# Patient Record
Sex: MALE | Race: WHITE | NOT HISPANIC OR LATINO | Employment: OTHER | URBAN - METROPOLITAN AREA
[De-identification: names, ages, dates, MRNs, and addresses within clinical notes are randomized per-mention and may not be internally consistent; named-entity substitution may affect disease eponyms.]

---

## 2019-01-01 ENCOUNTER — PATIENT OUTREACH (OUTPATIENT)
Dept: HEALTH INFORMATION MANAGEMENT | Facility: OTHER | Age: 69
End: 2019-01-01

## 2019-01-01 ENCOUNTER — HOSPITAL ENCOUNTER (EMERGENCY)
Facility: MEDICAL CENTER | Age: 69
End: 2019-06-11
Attending: EMERGENCY MEDICINE
Payer: MEDICARE

## 2019-01-01 ENCOUNTER — APPOINTMENT (OUTPATIENT)
Dept: MEDICAL GROUP | Facility: PHYSICIAN GROUP | Age: 69
End: 2019-01-01
Payer: MEDICARE

## 2019-01-01 ENCOUNTER — TELEPHONE (OUTPATIENT)
Dept: SCHEDULING | Facility: IMAGING CENTER | Age: 69
End: 2019-01-01

## 2019-01-01 ENCOUNTER — APPOINTMENT (OUTPATIENT)
Dept: RADIOLOGY | Facility: MEDICAL CENTER | Age: 69
End: 2019-01-01
Attending: RADIOLOGY
Payer: MEDICARE

## 2019-01-01 ENCOUNTER — HOSPITAL ENCOUNTER (OUTPATIENT)
Dept: LAB | Facility: MEDICAL CENTER | Age: 69
End: 2019-05-24
Attending: INTERNAL MEDICINE
Payer: MEDICARE

## 2019-01-01 ENCOUNTER — HOSPITAL ENCOUNTER (OUTPATIENT)
Dept: LAB | Facility: MEDICAL CENTER | Age: 69
End: 2019-05-29
Attending: NURSE PRACTITIONER
Payer: MEDICARE

## 2019-01-01 ENCOUNTER — HOSPITAL ENCOUNTER (OUTPATIENT)
Dept: RADIOLOGY | Facility: MEDICAL CENTER | Age: 69
End: 2019-07-03
Attending: INTERNAL MEDICINE
Payer: MEDICARE

## 2019-01-01 ENCOUNTER — HOSPITAL ENCOUNTER (INPATIENT)
Facility: MEDICAL CENTER | Age: 69
LOS: 8 days | DRG: 871 | End: 2019-09-30
Attending: EMERGENCY MEDICINE | Admitting: HOSPITALIST
Payer: MEDICARE

## 2019-01-01 ENCOUNTER — OFFICE VISIT (OUTPATIENT)
Dept: PULMONOLOGY | Facility: HOSPICE | Age: 69
End: 2019-01-01
Payer: MEDICARE

## 2019-01-01 ENCOUNTER — OFFICE VISIT (OUTPATIENT)
Dept: MEDICAL GROUP | Facility: PHYSICIAN GROUP | Age: 69
End: 2019-01-01

## 2019-01-01 ENCOUNTER — HOSPITAL ENCOUNTER (INPATIENT)
Facility: MEDICAL CENTER | Age: 69
LOS: 6 days | DRG: 177 | End: 2019-09-19
Attending: EMERGENCY MEDICINE | Admitting: INTERNAL MEDICINE
Payer: MEDICARE

## 2019-01-01 ENCOUNTER — HOSPITAL ENCOUNTER (OUTPATIENT)
Dept: LAB | Facility: MEDICAL CENTER | Age: 69
End: 2019-06-03
Attending: INTERNAL MEDICINE
Payer: MEDICARE

## 2019-01-01 ENCOUNTER — HOSPITAL ENCOUNTER (OUTPATIENT)
Facility: MEDICAL CENTER | Age: 69
End: 2019-05-08
Attending: FAMILY MEDICINE | Admitting: FAMILY MEDICINE
Payer: MEDICARE

## 2019-01-01 ENCOUNTER — APPOINTMENT (OUTPATIENT)
Dept: RADIOLOGY | Facility: MEDICAL CENTER | Age: 69
DRG: 177 | End: 2019-01-01
Attending: EMERGENCY MEDICINE
Payer: MEDICARE

## 2019-01-01 ENCOUNTER — HOSPITAL ENCOUNTER (OUTPATIENT)
Dept: LAB | Facility: MEDICAL CENTER | Age: 69
End: 2019-05-15
Attending: INTERNAL MEDICINE
Payer: MEDICARE

## 2019-01-01 ENCOUNTER — APPOINTMENT (OUTPATIENT)
Dept: RADIOLOGY | Facility: MEDICAL CENTER | Age: 69
DRG: 871 | End: 2019-01-01
Attending: EMERGENCY MEDICINE
Payer: MEDICARE

## 2019-01-01 ENCOUNTER — HOSPITAL ENCOUNTER (OUTPATIENT)
Dept: RADIOLOGY | Facility: MEDICAL CENTER | Age: 69
End: 2019-05-23
Attending: INTERNAL MEDICINE
Payer: MEDICARE

## 2019-01-01 ENCOUNTER — HOSPITAL ENCOUNTER (OUTPATIENT)
Dept: RADIOLOGY | Facility: MEDICAL CENTER | Age: 69
End: 2019-07-16
Attending: INTERNAL MEDICINE
Payer: MEDICARE

## 2019-01-01 ENCOUNTER — APPOINTMENT (OUTPATIENT)
Dept: RADIOLOGY | Facility: MEDICAL CENTER | Age: 69
DRG: 871 | End: 2019-01-01
Attending: HOSPITALIST
Payer: MEDICARE

## 2019-01-01 ENCOUNTER — APPOINTMENT (OUTPATIENT)
Dept: ADMISSIONS | Facility: MEDICAL CENTER | Age: 69
End: 2019-01-01
Attending: FAMILY MEDICINE
Payer: MEDICARE

## 2019-01-01 ENCOUNTER — HOME CARE VISIT (OUTPATIENT)
Dept: HOSPICE | Facility: HOSPICE | Age: 69
End: 2019-01-01
Payer: MEDICARE

## 2019-01-01 ENCOUNTER — TELEPHONE (OUTPATIENT)
Dept: PULMONOLOGY | Facility: HOSPICE | Age: 69
End: 2019-01-01

## 2019-01-01 ENCOUNTER — APPOINTMENT (OUTPATIENT)
Dept: RADIOLOGY | Facility: MEDICAL CENTER | Age: 69
End: 2019-01-01
Attending: PHYSICIAN ASSISTANT
Payer: MEDICARE

## 2019-01-01 ENCOUNTER — HOSPITAL ENCOUNTER (OUTPATIENT)
Dept: RADIOLOGY | Facility: MEDICAL CENTER | Age: 69
DRG: 177 | End: 2019-09-10
Attending: INTERNAL MEDICINE
Payer: MEDICARE

## 2019-01-01 ENCOUNTER — APPOINTMENT (OUTPATIENT)
Dept: CARDIOLOGY | Facility: MEDICAL CENTER | Age: 69
DRG: 871 | End: 2019-01-01
Attending: HOSPITALIST
Payer: MEDICARE

## 2019-01-01 ENCOUNTER — HOSPICE ADMISSION (OUTPATIENT)
Dept: HOSPICE | Facility: HOSPICE | Age: 69
End: 2019-01-01
Payer: MEDICARE

## 2019-01-01 ENCOUNTER — HOSPITAL ENCOUNTER (OUTPATIENT)
Dept: RADIOLOGY | Facility: MEDICAL CENTER | Age: 69
End: 2019-08-22
Attending: INTERNAL MEDICINE
Payer: MEDICARE

## 2019-01-01 ENCOUNTER — HOSPITAL ENCOUNTER (OUTPATIENT)
Dept: RADIOLOGY | Facility: MEDICAL CENTER | Age: 69
End: 2019-04-26

## 2019-01-01 ENCOUNTER — PATIENT OUTREACH (OUTPATIENT)
Dept: OTHER | Facility: MEDICAL CENTER | Age: 69
End: 2019-01-01

## 2019-01-01 VITALS
HEART RATE: 116 BPM | RESPIRATION RATE: 18 BRPM | OXYGEN SATURATION: 95 % | SYSTOLIC BLOOD PRESSURE: 108 MMHG | WEIGHT: 187.39 LBS | DIASTOLIC BLOOD PRESSURE: 62 MMHG | TEMPERATURE: 98.3 F | BODY MASS INDEX: 22.82 KG/M2 | HEIGHT: 76 IN

## 2019-01-01 VITALS
OXYGEN SATURATION: 92 % | DIASTOLIC BLOOD PRESSURE: 62 MMHG | HEIGHT: 75 IN | SYSTOLIC BLOOD PRESSURE: 104 MMHG | BODY MASS INDEX: 23.62 KG/M2 | HEART RATE: 101 BPM | RESPIRATION RATE: 16 BRPM | TEMPERATURE: 97.9 F | WEIGHT: 190 LBS

## 2019-01-01 VITALS
HEART RATE: 107 BPM | BODY MASS INDEX: 25.24 KG/M2 | TEMPERATURE: 97.9 F | WEIGHT: 203 LBS | RESPIRATION RATE: 16 BRPM | DIASTOLIC BLOOD PRESSURE: 70 MMHG | OXYGEN SATURATION: 94 % | HEIGHT: 75 IN | SYSTOLIC BLOOD PRESSURE: 112 MMHG

## 2019-01-01 VITALS
WEIGHT: 181.66 LBS | DIASTOLIC BLOOD PRESSURE: 57 MMHG | TEMPERATURE: 98.1 F | HEIGHT: 76 IN | SYSTOLIC BLOOD PRESSURE: 96 MMHG | HEART RATE: 112 BPM | RESPIRATION RATE: 20 BRPM | BODY MASS INDEX: 22.12 KG/M2 | OXYGEN SATURATION: 84 %

## 2019-01-01 VITALS
TEMPERATURE: 99.3 F | DIASTOLIC BLOOD PRESSURE: 52 MMHG | HEART RATE: 112 BPM | OXYGEN SATURATION: 85 % | HEIGHT: 75 IN | BODY MASS INDEX: 24.05 KG/M2 | WEIGHT: 193.4 LBS | RESPIRATION RATE: 16 BRPM | SYSTOLIC BLOOD PRESSURE: 104 MMHG

## 2019-01-01 VITALS
OXYGEN SATURATION: 94 % | HEIGHT: 75 IN | DIASTOLIC BLOOD PRESSURE: 83 MMHG | WEIGHT: 201.06 LBS | RESPIRATION RATE: 18 BRPM | BODY MASS INDEX: 25 KG/M2 | SYSTOLIC BLOOD PRESSURE: 129 MMHG | HEART RATE: 78 BPM | TEMPERATURE: 97.3 F

## 2019-01-01 VITALS
RESPIRATION RATE: 18 BRPM | OXYGEN SATURATION: 95 % | HEART RATE: 92 BPM | DIASTOLIC BLOOD PRESSURE: 89 MMHG | WEIGHT: 190.04 LBS | TEMPERATURE: 96.8 F | SYSTOLIC BLOOD PRESSURE: 121 MMHG | BODY MASS INDEX: 23.14 KG/M2 | HEIGHT: 76 IN

## 2019-01-01 DIAGNOSIS — R22.41 LOWER LEG MASS, RIGHT: ICD-10-CM

## 2019-01-01 DIAGNOSIS — Z11.59 NEED FOR HEPATITIS C SCREENING TEST: ICD-10-CM

## 2019-01-01 DIAGNOSIS — C34.81 MALIGNANT NEOPLASM OF OVERLAPPING SITES OF RIGHT LUNG (HCC): ICD-10-CM

## 2019-01-01 DIAGNOSIS — Z72.0 TOBACCO USE: ICD-10-CM

## 2019-01-01 DIAGNOSIS — R06.02 SOB (SHORTNESS OF BREATH): ICD-10-CM

## 2019-01-01 DIAGNOSIS — C80.1 CANCER (HCC): ICD-10-CM

## 2019-01-01 DIAGNOSIS — I82.4Y1 DEEP VEIN THROMBOSIS (DVT) OF PROXIMAL VEIN OF RIGHT LOWER EXTREMITY, UNSPECIFIED CHRONICITY (HCC): ICD-10-CM

## 2019-01-01 DIAGNOSIS — I82.4Y2 DEEP VEIN THROMBOSIS (DVT) OF PROXIMAL VEIN OF LEFT LOWER EXTREMITY, UNSPECIFIED CHRONICITY (HCC): ICD-10-CM

## 2019-01-01 DIAGNOSIS — C34.11 MALIGNANT NEOPLASM OF UPPER LOBE OF RIGHT LUNG (HCC): ICD-10-CM

## 2019-01-01 DIAGNOSIS — J96.21 ACUTE ON CHRONIC RESPIRATORY FAILURE WITH HYPOXIA (HCC): ICD-10-CM

## 2019-01-01 DIAGNOSIS — C34.81 MALIGNANT NEOPLASM OF OVERLAPPING SITES OF RIGHT LUNG (HCC): Primary | ICD-10-CM

## 2019-01-01 DIAGNOSIS — R91.8 MASS OF RIGHT LUNG: ICD-10-CM

## 2019-01-01 DIAGNOSIS — I82.4Y3 DEEP VEIN THROMBOSIS (DVT) OF PROXIMAL VEIN OF BOTH LOWER EXTREMITIES, UNSPECIFIED CHRONICITY (HCC): ICD-10-CM

## 2019-01-01 DIAGNOSIS — I82.5Y2 CHRONIC DEEP VEIN THROMBOSIS (DVT) OF PROXIMAL VEIN OF LEFT LOWER EXTREMITY (HCC): ICD-10-CM

## 2019-01-01 DIAGNOSIS — I82.4Z3 ACUTE EMBOLISM AND THROMBOSIS OF DEEP VEIN OF BOTH DISTAL LOWER EXTREMITIES (HCC): ICD-10-CM

## 2019-01-01 DIAGNOSIS — J18.9 PNEUMONIA DUE TO INFECTIOUS ORGANISM, UNSPECIFIED LATERALITY, UNSPECIFIED PART OF LUNG: ICD-10-CM

## 2019-01-01 DIAGNOSIS — C34.91 MALIGNANT NEOPLASM OF RIGHT LUNG, UNSPECIFIED PART OF LUNG (HCC): ICD-10-CM

## 2019-01-01 DIAGNOSIS — I82.503 CHRONIC VENOUS EMBOLISM AND THROMBOSIS OF DEEP VESSELS OF LOWER EXTREMITY, BILATERAL (HCC): ICD-10-CM

## 2019-01-01 DIAGNOSIS — R91.8 OTHER NONSPECIFIC ABNORMAL FINDING OF LUNG FIELD: ICD-10-CM

## 2019-01-01 DIAGNOSIS — R05.9 COUGH: ICD-10-CM

## 2019-01-01 DIAGNOSIS — D47.3 THROMBOCYTHEMIA, ESSENTIAL (HCC): ICD-10-CM

## 2019-01-01 DIAGNOSIS — C34.81 CANCER OF OVERLAPPING SITES OF RIGHT LUNG (HCC): ICD-10-CM

## 2019-01-01 DIAGNOSIS — R60.0 LOCALIZED EDEMA: ICD-10-CM

## 2019-01-01 DIAGNOSIS — R60.0 PEDAL EDEMA: ICD-10-CM

## 2019-01-01 DIAGNOSIS — J18.9 PNEUMONIA OF RIGHT LUNG DUE TO INFECTIOUS ORGANISM, UNSPECIFIED PART OF LUNG: ICD-10-CM

## 2019-01-01 DIAGNOSIS — J18.9 PNEUMONIA OF BOTH LUNGS DUE TO INFECTIOUS ORGANISM, UNSPECIFIED PART OF LUNG: ICD-10-CM

## 2019-01-01 LAB
ALBUMIN SERPL BCP-MCNC: 3.2 G/DL (ref 3.2–4.9)
ALBUMIN SERPL BCP-MCNC: 3.3 G/DL (ref 3.2–4.9)
ALBUMIN SERPL BCP-MCNC: 3.4 G/DL (ref 3.2–4.9)
ALBUMIN SERPL BCP-MCNC: 3.9 G/DL (ref 3.2–4.9)
ALBUMIN SERPL BCP-MCNC: 4 G/DL (ref 3.2–4.9)
ALBUMIN SERPL BCP-MCNC: 4.1 G/DL (ref 3.2–4.9)
ALBUMIN SERPL BCP-MCNC: 4.2 G/DL (ref 3.2–4.9)
ALBUMIN/GLOB SERPL: 1 G/DL
ALBUMIN/GLOB SERPL: 1 G/DL
ALBUMIN/GLOB SERPL: 1.1 G/DL
ALBUMIN/GLOB SERPL: 1.1 G/DL
ALBUMIN/GLOB SERPL: 1.3 G/DL
ALP SERPL-CCNC: 103 U/L (ref 30–99)
ALP SERPL-CCNC: 120 U/L (ref 30–99)
ALP SERPL-CCNC: 71 U/L (ref 30–99)
ALP SERPL-CCNC: 73 U/L (ref 30–99)
ALP SERPL-CCNC: 79 U/L (ref 30–99)
ALP SERPL-CCNC: 86 U/L (ref 30–99)
ALP SERPL-CCNC: 95 U/L (ref 30–99)
ALT SERPL-CCNC: 19 U/L (ref 2–50)
ALT SERPL-CCNC: 19 U/L (ref 2–50)
ALT SERPL-CCNC: 25 U/L (ref 2–50)
ALT SERPL-CCNC: 33 U/L (ref 2–50)
ALT SERPL-CCNC: 7 U/L (ref 2–50)
ALT SERPL-CCNC: 7 U/L (ref 2–50)
ALT SERPL-CCNC: 8 U/L (ref 2–50)
ANION GAP SERPL CALC-SCNC: 10 MMOL/L (ref 0–11.9)
ANION GAP SERPL CALC-SCNC: 12 MMOL/L (ref 0–11.9)
ANION GAP SERPL CALC-SCNC: 13 MMOL/L (ref 0–11.9)
ANION GAP SERPL CALC-SCNC: 6 MMOL/L (ref 0–11.9)
ANION GAP SERPL CALC-SCNC: 7 MMOL/L (ref 0–11.9)
ANION GAP SERPL CALC-SCNC: 7 MMOL/L (ref 0–11.9)
ANION GAP SERPL CALC-SCNC: 8 MMOL/L (ref 0–11.9)
ANION GAP SERPL CALC-SCNC: 9 MMOL/L (ref 0–11.9)
APPEARANCE UR: CLEAR
AST SERPL-CCNC: 29 U/L (ref 12–45)
AST SERPL-CCNC: 34 U/L (ref 12–45)
AST SERPL-CCNC: 37 U/L (ref 12–45)
AST SERPL-CCNC: 38 U/L (ref 12–45)
AST SERPL-CCNC: 41 U/L (ref 12–45)
AST SERPL-CCNC: 44 U/L (ref 12–45)
AST SERPL-CCNC: 45 U/L (ref 12–45)
BACTERIA BLD CULT: NORMAL
BACTERIA SPEC RESP CULT: ABNORMAL
BACTERIA SPEC RESP CULT: ABNORMAL
BACTERIA UR CULT: NORMAL
BASOPHILS # BLD AUTO: 0.2 % (ref 0–1.8)
BASOPHILS # BLD AUTO: 0.3 % (ref 0–1.8)
BASOPHILS # BLD AUTO: 0.3 % (ref 0–1.8)
BASOPHILS # BLD AUTO: 0.4 % (ref 0–1.8)
BASOPHILS # BLD AUTO: 0.7 % (ref 0–1.8)
BASOPHILS # BLD: 0.02 K/UL (ref 0–0.12)
BASOPHILS # BLD: 0.02 K/UL (ref 0–0.12)
BASOPHILS # BLD: 0.03 K/UL (ref 0–0.12)
BASOPHILS # BLD: 0.04 K/UL (ref 0–0.12)
BASOPHILS # BLD: 0.05 K/UL (ref 0–0.12)
BASOPHILS # BLD: 0.06 K/UL (ref 0–0.12)
BASOPHILS # BLD: 0.06 K/UL (ref 0–0.12)
BILIRUB SERPL-MCNC: 0.4 MG/DL (ref 0.1–1.5)
BILIRUB SERPL-MCNC: 0.4 MG/DL (ref 0.1–1.5)
BILIRUB SERPL-MCNC: 0.5 MG/DL (ref 0.1–1.5)
BILIRUB SERPL-MCNC: 0.7 MG/DL (ref 0.1–1.5)
BILIRUB UR QL STRIP.AUTO: NEGATIVE
BLOOD CULTURE HOLD CXBCH: NORMAL
BUN SERPL-MCNC: 10 MG/DL (ref 8–22)
BUN SERPL-MCNC: 11 MG/DL (ref 8–22)
BUN SERPL-MCNC: 12 MG/DL (ref 8–22)
BUN SERPL-MCNC: 12 MG/DL (ref 8–22)
BUN SERPL-MCNC: 13 MG/DL (ref 8–22)
BUN SERPL-MCNC: 13 MG/DL (ref 8–22)
BUN SERPL-MCNC: 14 MG/DL (ref 8–22)
BUN SERPL-MCNC: 15 MG/DL (ref 8–22)
BUN SERPL-MCNC: 8 MG/DL (ref 8–22)
BUN SERPL-MCNC: 9 MG/DL (ref 8–22)
CALCIUM SERPL-MCNC: 10 MG/DL (ref 8.5–10.5)
CALCIUM SERPL-MCNC: 10.1 MG/DL (ref 8.5–10.5)
CALCIUM SERPL-MCNC: 9 MG/DL (ref 8.5–10.5)
CALCIUM SERPL-MCNC: 9.1 MG/DL (ref 8.5–10.5)
CALCIUM SERPL-MCNC: 9.3 MG/DL (ref 8.5–10.5)
CALCIUM SERPL-MCNC: 9.4 MG/DL (ref 8.5–10.5)
CALCIUM SERPL-MCNC: 9.5 MG/DL (ref 8.5–10.5)
CALCIUM SERPL-MCNC: 9.5 MG/DL (ref 8.5–10.5)
CALCIUM SERPL-MCNC: 9.6 MG/DL (ref 8.5–10.5)
CALCIUM SERPL-MCNC: 9.7 MG/DL (ref 8.5–10.5)
CALCIUM SERPL-MCNC: 9.7 MG/DL (ref 8.5–10.5)
CALCIUM SERPL-MCNC: 9.8 MG/DL (ref 8.5–10.5)
CALCIUM SERPL-MCNC: 9.9 MG/DL (ref 8.5–10.5)
CHLORIDE SERPL-SCNC: 86 MMOL/L (ref 96–112)
CHLORIDE SERPL-SCNC: 89 MMOL/L (ref 96–112)
CHLORIDE SERPL-SCNC: 89 MMOL/L (ref 96–112)
CHLORIDE SERPL-SCNC: 90 MMOL/L (ref 96–112)
CHLORIDE SERPL-SCNC: 91 MMOL/L (ref 96–112)
CHLORIDE SERPL-SCNC: 94 MMOL/L (ref 96–112)
CHLORIDE SERPL-SCNC: 95 MMOL/L (ref 96–112)
CHLORIDE SERPL-SCNC: 96 MMOL/L (ref 96–112)
CHLORIDE SERPL-SCNC: 98 MMOL/L (ref 96–112)
CHLORIDE SERPL-SCNC: 98 MMOL/L (ref 96–112)
CHLORIDE SERPL-SCNC: 99 MMOL/L (ref 96–112)
CO2 SERPL-SCNC: 24 MMOL/L (ref 20–33)
CO2 SERPL-SCNC: 27 MMOL/L (ref 20–33)
CO2 SERPL-SCNC: 28 MMOL/L (ref 20–33)
CO2 SERPL-SCNC: 30 MMOL/L (ref 20–33)
CO2 SERPL-SCNC: 30 MMOL/L (ref 20–33)
CO2 SERPL-SCNC: 32 MMOL/L (ref 20–33)
CO2 SERPL-SCNC: 32 MMOL/L (ref 20–33)
CO2 SERPL-SCNC: 33 MMOL/L (ref 20–33)
CO2 SERPL-SCNC: 35 MMOL/L (ref 20–33)
CO2 SERPL-SCNC: 37 MMOL/L (ref 20–33)
COLOR UR: YELLOW
CREAT SERPL-MCNC: 0.45 MG/DL (ref 0.5–1.4)
CREAT SERPL-MCNC: 0.47 MG/DL (ref 0.5–1.4)
CREAT SERPL-MCNC: 0.48 MG/DL (ref 0.5–1.4)
CREAT SERPL-MCNC: 0.49 MG/DL (ref 0.5–1.4)
CREAT SERPL-MCNC: 0.5 MG/DL (ref 0.5–1.4)
CREAT SERPL-MCNC: 0.52 MG/DL (ref 0.5–1.4)
CREAT SERPL-MCNC: 0.54 MG/DL (ref 0.5–1.4)
CREAT SERPL-MCNC: 0.58 MG/DL (ref 0.5–1.4)
CREAT SERPL-MCNC: 0.59 MG/DL (ref 0.5–1.4)
CREAT SERPL-MCNC: 0.62 MG/DL (ref 0.5–1.4)
CREAT SERPL-MCNC: 0.68 MG/DL (ref 0.5–1.4)
CREAT SERPL-MCNC: 0.71 MG/DL (ref 0.5–1.4)
CREAT SERPL-MCNC: 0.81 MG/DL (ref 0.5–1.4)
EKG IMPRESSION: NORMAL
EOSINOPHIL # BLD AUTO: 0 K/UL (ref 0–0.51)
EOSINOPHIL # BLD AUTO: 0.01 K/UL (ref 0–0.51)
EOSINOPHIL # BLD AUTO: 0.01 K/UL (ref 0–0.51)
EOSINOPHIL # BLD AUTO: 0.02 K/UL (ref 0–0.51)
EOSINOPHIL # BLD AUTO: 0.03 K/UL (ref 0–0.51)
EOSINOPHIL # BLD AUTO: 0.03 K/UL (ref 0–0.51)
EOSINOPHIL # BLD AUTO: 0.04 K/UL (ref 0–0.51)
EOSINOPHIL # BLD AUTO: 0.05 K/UL (ref 0–0.51)
EOSINOPHIL # BLD AUTO: 0.07 K/UL (ref 0–0.51)
EOSINOPHIL # BLD AUTO: 0.08 K/UL (ref 0–0.51)
EOSINOPHIL NFR BLD: 0 % (ref 0–6.9)
EOSINOPHIL NFR BLD: 0.1 % (ref 0–6.9)
EOSINOPHIL NFR BLD: 0.1 % (ref 0–6.9)
EOSINOPHIL NFR BLD: 0.2 % (ref 0–6.9)
EOSINOPHIL NFR BLD: 0.3 % (ref 0–6.9)
EOSINOPHIL NFR BLD: 0.3 % (ref 0–6.9)
EOSINOPHIL NFR BLD: 0.4 % (ref 0–6.9)
EOSINOPHIL NFR BLD: 0.5 % (ref 0–6.9)
EOSINOPHIL NFR BLD: 0.7 % (ref 0–6.9)
EOSINOPHIL NFR BLD: 0.8 % (ref 0–6.9)
ERYTHROCYTE [DISTWIDTH] IN BLOOD BY AUTOMATED COUNT: 52.7 FL (ref 35.9–50)
ERYTHROCYTE [DISTWIDTH] IN BLOOD BY AUTOMATED COUNT: 53 FL (ref 35.9–50)
ERYTHROCYTE [DISTWIDTH] IN BLOOD BY AUTOMATED COUNT: 53.1 FL (ref 35.9–50)
ERYTHROCYTE [DISTWIDTH] IN BLOOD BY AUTOMATED COUNT: 53.3 FL (ref 35.9–50)
ERYTHROCYTE [DISTWIDTH] IN BLOOD BY AUTOMATED COUNT: 53.5 FL (ref 35.9–50)
ERYTHROCYTE [DISTWIDTH] IN BLOOD BY AUTOMATED COUNT: 53.8 FL (ref 35.9–50)
ERYTHROCYTE [DISTWIDTH] IN BLOOD BY AUTOMATED COUNT: 54 FL (ref 35.9–50)
ERYTHROCYTE [DISTWIDTH] IN BLOOD BY AUTOMATED COUNT: 54.2 FL (ref 35.9–50)
ERYTHROCYTE [DISTWIDTH] IN BLOOD BY AUTOMATED COUNT: 54.4 FL (ref 35.9–50)
ERYTHROCYTE [DISTWIDTH] IN BLOOD BY AUTOMATED COUNT: 54.8 FL (ref 35.9–50)
ERYTHROCYTE [DISTWIDTH] IN BLOOD BY AUTOMATED COUNT: 55.1 FL (ref 35.9–50)
ERYTHROCYTE [DISTWIDTH] IN BLOOD BY AUTOMATED COUNT: 55.4 FL (ref 35.9–50)
FASTING STATUS PATIENT QL REPORTED: NORMAL
GLOBULIN SER CALC-MCNC: 3 G/DL (ref 1.9–3.5)
GLOBULIN SER CALC-MCNC: 3 G/DL (ref 1.9–3.5)
GLOBULIN SER CALC-MCNC: 3.1 G/DL (ref 1.9–3.5)
GLOBULIN SER CALC-MCNC: 3.3 G/DL (ref 1.9–3.5)
GLOBULIN SER CALC-MCNC: 3.4 G/DL (ref 1.9–3.5)
GLOBULIN SER CALC-MCNC: 3.4 G/DL (ref 1.9–3.5)
GLOBULIN SER CALC-MCNC: 3.7 G/DL (ref 1.9–3.5)
GLUCOSE SERPL-MCNC: 101 MG/DL (ref 65–99)
GLUCOSE SERPL-MCNC: 102 MG/DL (ref 65–99)
GLUCOSE SERPL-MCNC: 109 MG/DL (ref 65–99)
GLUCOSE SERPL-MCNC: 114 MG/DL (ref 65–99)
GLUCOSE SERPL-MCNC: 115 MG/DL (ref 65–99)
GLUCOSE SERPL-MCNC: 116 MG/DL (ref 65–99)
GLUCOSE SERPL-MCNC: 118 MG/DL (ref 65–99)
GLUCOSE SERPL-MCNC: 119 MG/DL (ref 65–99)
GLUCOSE SERPL-MCNC: 119 MG/DL (ref 65–99)
GLUCOSE SERPL-MCNC: 125 MG/DL (ref 65–99)
GLUCOSE SERPL-MCNC: 129 MG/DL (ref 65–99)
GLUCOSE SERPL-MCNC: 129 MG/DL (ref 65–99)
GLUCOSE SERPL-MCNC: 131 MG/DL (ref 65–99)
GLUCOSE SERPL-MCNC: 146 MG/DL (ref 65–99)
GLUCOSE SERPL-MCNC: 91 MG/DL (ref 65–99)
GLUCOSE UR STRIP.AUTO-MCNC: NEGATIVE MG/DL
GRAM STN SPEC: ABNORMAL
GRAM STN SPEC: NORMAL
HCT VFR BLD AUTO: 27.5 % (ref 42–52)
HCT VFR BLD AUTO: 28.8 % (ref 42–52)
HCT VFR BLD AUTO: 28.8 % (ref 42–52)
HCT VFR BLD AUTO: 29 % (ref 42–52)
HCT VFR BLD AUTO: 29.2 % (ref 42–52)
HCT VFR BLD AUTO: 29.3 % (ref 42–52)
HCT VFR BLD AUTO: 29.8 % (ref 42–52)
HCT VFR BLD AUTO: 30.2 % (ref 42–52)
HCT VFR BLD AUTO: 30.5 % (ref 42–52)
HCT VFR BLD AUTO: 31 % (ref 42–52)
HCT VFR BLD AUTO: 31.6 % (ref 42–52)
HCT VFR BLD AUTO: 32.9 % (ref 42–52)
HGB BLD-MCNC: 10.1 G/DL (ref 14–18)
HGB BLD-MCNC: 8.3 G/DL (ref 14–18)
HGB BLD-MCNC: 8.7 G/DL (ref 14–18)
HGB BLD-MCNC: 8.8 G/DL (ref 14–18)
HGB BLD-MCNC: 9 G/DL (ref 14–18)
HGB BLD-MCNC: 9.1 G/DL (ref 14–18)
HGB BLD-MCNC: 9.2 G/DL (ref 14–18)
HGB BLD-MCNC: 9.3 G/DL (ref 14–18)
HGB BLD-MCNC: 9.4 G/DL (ref 14–18)
HGB BLD-MCNC: 9.9 G/DL (ref 14–18)
IMM GRANULOCYTES # BLD AUTO: 0.04 K/UL (ref 0–0.11)
IMM GRANULOCYTES # BLD AUTO: 0.05 K/UL (ref 0–0.11)
IMM GRANULOCYTES # BLD AUTO: 0.05 K/UL (ref 0–0.11)
IMM GRANULOCYTES # BLD AUTO: 0.06 K/UL (ref 0–0.11)
IMM GRANULOCYTES # BLD AUTO: 0.06 K/UL (ref 0–0.11)
IMM GRANULOCYTES # BLD AUTO: 0.07 K/UL (ref 0–0.11)
IMM GRANULOCYTES # BLD AUTO: 0.07 K/UL (ref 0–0.11)
IMM GRANULOCYTES NFR BLD AUTO: 0.3 % (ref 0–0.9)
IMM GRANULOCYTES NFR BLD AUTO: 0.4 % (ref 0–0.9)
IMM GRANULOCYTES NFR BLD AUTO: 0.5 % (ref 0–0.9)
INR PPP: 1.12 (ref 0.87–1.13)
INR PPP: 1.18 (ref 0.87–1.13)
KETONES UR STRIP.AUTO-MCNC: NEGATIVE MG/DL
LACTATE BLD-SCNC: 1.6 MMOL/L (ref 0.5–2)
LACTATE BLD-SCNC: 1.8 MMOL/L (ref 0.5–2)
LACTATE BLD-SCNC: 1.9 MMOL/L (ref 0.5–2)
LACTATE BLD-SCNC: 2.2 MMOL/L (ref 0.5–2)
LACTATE BLD-SCNC: 2.2 MMOL/L (ref 0.5–2)
LDH SERPL L TO P-CCNC: 454 U/L (ref 107–266)
LEUKOCYTE ESTERASE UR QL STRIP.AUTO: NEGATIVE
LV EJECT FRACT  99904: 55
LV EJECT FRACT MOD 2C 99903: 54.58
LV EJECT FRACT MOD 4C 99902: 56.17
LV EJECT FRACT MOD BP 99901: 56.56
LYMPHOCYTES # BLD AUTO: 0.41 K/UL (ref 1–4.8)
LYMPHOCYTES # BLD AUTO: 0.47 K/UL (ref 1–4.8)
LYMPHOCYTES # BLD AUTO: 0.55 K/UL (ref 1–4.8)
LYMPHOCYTES # BLD AUTO: 0.64 K/UL (ref 1–4.8)
LYMPHOCYTES # BLD AUTO: 0.64 K/UL (ref 1–4.8)
LYMPHOCYTES # BLD AUTO: 0.68 K/UL (ref 1–4.8)
LYMPHOCYTES # BLD AUTO: 0.75 K/UL (ref 1–4.8)
LYMPHOCYTES # BLD AUTO: 0.76 K/UL (ref 1–4.8)
LYMPHOCYTES # BLD AUTO: 0.77 K/UL (ref 1–4.8)
LYMPHOCYTES # BLD AUTO: 0.79 K/UL (ref 1–4.8)
LYMPHOCYTES NFR BLD: 2.5 % (ref 22–41)
LYMPHOCYTES NFR BLD: 3.8 % (ref 22–41)
LYMPHOCYTES NFR BLD: 3.9 % (ref 22–41)
LYMPHOCYTES NFR BLD: 4.3 % (ref 22–41)
LYMPHOCYTES NFR BLD: 5.3 % (ref 22–41)
LYMPHOCYTES NFR BLD: 5.8 % (ref 22–41)
LYMPHOCYTES NFR BLD: 6.5 % (ref 22–41)
LYMPHOCYTES NFR BLD: 6.8 % (ref 22–41)
LYMPHOCYTES NFR BLD: 7 % (ref 22–41)
LYMPHOCYTES NFR BLD: 7.5 % (ref 22–41)
LYMPHOCYTES NFR BLD: 8 % (ref 22–41)
LYMPHOCYTES NFR BLD: 8.1 % (ref 22–41)
MAGNESIUM SERPL-MCNC: 1.6 MG/DL (ref 1.5–2.5)
MAGNESIUM SERPL-MCNC: 2 MG/DL (ref 1.5–2.5)
MCH RBC QN AUTO: 29.3 PG (ref 27–33)
MCH RBC QN AUTO: 29.4 PG (ref 27–33)
MCH RBC QN AUTO: 29.5 PG (ref 27–33)
MCH RBC QN AUTO: 29.5 PG (ref 27–33)
MCH RBC QN AUTO: 29.6 PG (ref 27–33)
MCH RBC QN AUTO: 29.8 PG (ref 27–33)
MCH RBC QN AUTO: 29.9 PG (ref 27–33)
MCH RBC QN AUTO: 30.1 PG (ref 27–33)
MCH RBC QN AUTO: 30.5 PG (ref 27–33)
MCH RBC QN AUTO: 30.8 PG (ref 27–33)
MCH RBC QN AUTO: 30.8 PG (ref 27–33)
MCH RBC QN AUTO: 30.9 PG (ref 27–33)
MCHC RBC AUTO-ENTMCNC: 30 G/DL (ref 33.7–35.3)
MCHC RBC AUTO-ENTMCNC: 30.2 G/DL (ref 33.7–35.3)
MCHC RBC AUTO-ENTMCNC: 30.6 G/DL (ref 33.7–35.3)
MCHC RBC AUTO-ENTMCNC: 30.7 G/DL (ref 33.7–35.3)
MCHC RBC AUTO-ENTMCNC: 31.1 G/DL (ref 33.7–35.3)
MCHC RBC AUTO-ENTMCNC: 31.3 G/DL (ref 33.7–35.3)
MCHC RBC AUTO-ENTMCNC: 31.4 G/DL (ref 33.7–35.3)
MCHC RBC AUTO-ENTMCNC: 31.4 G/DL (ref 33.7–35.3)
MCHC RBC AUTO-ENTMCNC: 31.5 G/DL (ref 33.7–35.3)
MCV RBC AUTO: 96.2 FL (ref 81.4–97.8)
MCV RBC AUTO: 97.2 FL (ref 81.4–97.8)
MCV RBC AUTO: 97.4 FL (ref 81.4–97.8)
MCV RBC AUTO: 97.6 FL (ref 81.4–97.8)
MCV RBC AUTO: 97.7 FL (ref 81.4–97.8)
MCV RBC AUTO: 97.7 FL (ref 81.4–97.8)
MCV RBC AUTO: 97.8 FL (ref 81.4–97.8)
MCV RBC AUTO: 97.9 FL (ref 81.4–97.8)
MCV RBC AUTO: 98 FL (ref 81.4–97.8)
MCV RBC AUTO: 98.2 FL (ref 81.4–97.8)
MCV RBC AUTO: 98.3 FL (ref 81.4–97.8)
MCV RBC AUTO: 98.3 FL (ref 81.4–97.8)
MICRO URNS: NORMAL
MONOCYTES # BLD AUTO: 0.78 K/UL (ref 0–0.85)
MONOCYTES # BLD AUTO: 0.81 K/UL (ref 0–0.85)
MONOCYTES # BLD AUTO: 0.89 K/UL (ref 0–0.85)
MONOCYTES # BLD AUTO: 0.9 K/UL (ref 0–0.85)
MONOCYTES # BLD AUTO: 0.93 K/UL (ref 0–0.85)
MONOCYTES # BLD AUTO: 0.99 K/UL (ref 0–0.85)
MONOCYTES # BLD AUTO: 1.02 K/UL (ref 0–0.85)
MONOCYTES # BLD AUTO: 1.05 K/UL (ref 0–0.85)
MONOCYTES # BLD AUTO: 1.07 K/UL (ref 0–0.85)
MONOCYTES # BLD AUTO: 1.16 K/UL (ref 0–0.85)
MONOCYTES # BLD AUTO: 1.17 K/UL (ref 0–0.85)
MONOCYTES # BLD AUTO: 1.18 K/UL (ref 0–0.85)
MONOCYTES NFR BLD AUTO: 10.1 % (ref 0–13.4)
MONOCYTES NFR BLD AUTO: 10.5 % (ref 0–13.4)
MONOCYTES NFR BLD AUTO: 10.9 % (ref 0–13.4)
MONOCYTES NFR BLD AUTO: 11.9 % (ref 0–13.4)
MONOCYTES NFR BLD AUTO: 4.9 % (ref 0–13.4)
MONOCYTES NFR BLD AUTO: 6.8 % (ref 0–13.4)
MONOCYTES NFR BLD AUTO: 6.8 % (ref 0–13.4)
MONOCYTES NFR BLD AUTO: 7.2 % (ref 0–13.4)
MONOCYTES NFR BLD AUTO: 7.9 % (ref 0–13.4)
MONOCYTES NFR BLD AUTO: 8.1 % (ref 0–13.4)
MONOCYTES NFR BLD AUTO: 8.7 % (ref 0–13.4)
MONOCYTES NFR BLD AUTO: 8.7 % (ref 0–13.4)
NEUTROPHILS # BLD AUTO: 10.4 K/UL (ref 1.82–7.42)
NEUTROPHILS # BLD AUTO: 11.13 K/UL (ref 1.82–7.42)
NEUTROPHILS # BLD AUTO: 12.92 K/UL (ref 1.82–7.42)
NEUTROPHILS # BLD AUTO: 13.06 K/UL (ref 1.82–7.42)
NEUTROPHILS # BLD AUTO: 15.33 K/UL (ref 1.82–7.42)
NEUTROPHILS # BLD AUTO: 7.37 K/UL (ref 1.82–7.42)
NEUTROPHILS # BLD AUTO: 7.83 K/UL (ref 1.82–7.42)
NEUTROPHILS # BLD AUTO: 8.47 K/UL (ref 1.82–7.42)
NEUTROPHILS # BLD AUTO: 8.62 K/UL (ref 1.82–7.42)
NEUTROPHILS # BLD AUTO: 9.05 K/UL (ref 1.82–7.42)
NEUTROPHILS # BLD AUTO: 9.43 K/UL (ref 1.82–7.42)
NEUTROPHILS # BLD AUTO: 9.87 K/UL (ref 1.82–7.42)
NEUTROPHILS NFR BLD: 79 % (ref 44–72)
NEUTROPHILS NFR BLD: 79.9 % (ref 44–72)
NEUTROPHILS NFR BLD: 80.6 % (ref 44–72)
NEUTROPHILS NFR BLD: 81.4 % (ref 44–72)
NEUTROPHILS NFR BLD: 82.5 % (ref 44–72)
NEUTROPHILS NFR BLD: 85.3 % (ref 44–72)
NEUTROPHILS NFR BLD: 85.8 % (ref 44–72)
NEUTROPHILS NFR BLD: 86.1 % (ref 44–72)
NEUTROPHILS NFR BLD: 86.7 % (ref 44–72)
NEUTROPHILS NFR BLD: 87.5 % (ref 44–72)
NEUTROPHILS NFR BLD: 88.8 % (ref 44–72)
NEUTROPHILS NFR BLD: 91.9 % (ref 44–72)
NITRITE UR QL STRIP.AUTO: NEGATIVE
NRBC # BLD AUTO: 0 K/UL
NRBC BLD-RTO: 0 /100 WBC
NT-PROBNP SERPL IA-MCNC: 179 PG/ML (ref 0–125)
PATHOLOGY CONSULT NOTE: NORMAL
PH UR STRIP.AUTO: 7 [PH] (ref 5–8)
PHOSPHATE SERPL-MCNC: 3.3 MG/DL (ref 2.5–4.5)
PHOSPHATE SERPL-MCNC: 3.3 MG/DL (ref 2.5–4.5)
PLATELET # BLD AUTO: 242 K/UL (ref 164–446)
PLATELET # BLD AUTO: 280 K/UL (ref 164–446)
PLATELET # BLD AUTO: 287 K/UL (ref 164–446)
PLATELET # BLD AUTO: 288 K/UL (ref 164–446)
PLATELET # BLD AUTO: 300 K/UL (ref 164–446)
PLATELET # BLD AUTO: 300 K/UL (ref 164–446)
PLATELET # BLD AUTO: 310 K/UL (ref 164–446)
PLATELET # BLD AUTO: 320 K/UL (ref 164–446)
PLATELET # BLD AUTO: 326 K/UL (ref 164–446)
PLATELET # BLD AUTO: 327 K/UL (ref 164–446)
PLATELET # BLD AUTO: 331 K/UL (ref 164–446)
PLATELET # BLD AUTO: 362 K/UL (ref 164–446)
PLATELET # BLD AUTO: 363 K/UL (ref 164–446)
PMV BLD AUTO: 8.8 FL (ref 9–12.9)
PMV BLD AUTO: 8.9 FL (ref 9–12.9)
PMV BLD AUTO: 9 FL (ref 9–12.9)
PMV BLD AUTO: 9 FL (ref 9–12.9)
PMV BLD AUTO: 9.2 FL (ref 9–12.9)
PMV BLD AUTO: 9.2 FL (ref 9–12.9)
PMV BLD AUTO: 9.5 FL (ref 9–12.9)
PMV BLD AUTO: 9.6 FL (ref 9–12.9)
POTASSIUM SERPL-SCNC: 3.1 MMOL/L (ref 3.6–5.5)
POTASSIUM SERPL-SCNC: 3.2 MMOL/L (ref 3.6–5.5)
POTASSIUM SERPL-SCNC: 3.4 MMOL/L (ref 3.6–5.5)
POTASSIUM SERPL-SCNC: 3.4 MMOL/L (ref 3.6–5.5)
POTASSIUM SERPL-SCNC: 3.7 MMOL/L (ref 3.6–5.5)
POTASSIUM SERPL-SCNC: 3.8 MMOL/L (ref 3.6–5.5)
POTASSIUM SERPL-SCNC: 3.9 MMOL/L (ref 3.6–5.5)
POTASSIUM SERPL-SCNC: 4 MMOL/L (ref 3.6–5.5)
POTASSIUM SERPL-SCNC: 4.2 MMOL/L (ref 3.6–5.5)
POTASSIUM SERPL-SCNC: 4.5 MMOL/L (ref 3.6–5.5)
POTASSIUM SERPL-SCNC: 5.1 MMOL/L (ref 3.6–5.5)
PROCALCITONIN SERPL-MCNC: 0.13 NG/ML
PROCALCITONIN SERPL-MCNC: 0.17 NG/ML
PROCALCITONIN SERPL-MCNC: 0.18 NG/ML
PROT SERPL-MCNC: 6.2 G/DL (ref 6–8.2)
PROT SERPL-MCNC: 6.7 G/DL (ref 6–8.2)
PROT SERPL-MCNC: 6.8 G/DL (ref 6–8.2)
PROT SERPL-MCNC: 7 G/DL (ref 6–8.2)
PROT SERPL-MCNC: 7.2 G/DL (ref 6–8.2)
PROT SERPL-MCNC: 7.5 G/DL (ref 6–8.2)
PROT SERPL-MCNC: 7.6 G/DL (ref 6–8.2)
PROT UR QL STRIP: NEGATIVE MG/DL
PROTHROMBIN TIME: 14.5 SEC (ref 12–14.6)
PROTHROMBIN TIME: 15.2 SEC (ref 12–14.6)
RBC # BLD AUTO: 2.81 M/UL (ref 4.7–6.1)
RBC # BLD AUTO: 2.94 M/UL (ref 4.7–6.1)
RBC # BLD AUTO: 2.95 M/UL (ref 4.7–6.1)
RBC # BLD AUTO: 2.95 M/UL (ref 4.7–6.1)
RBC # BLD AUTO: 2.98 M/UL (ref 4.7–6.1)
RBC # BLD AUTO: 2.99 M/UL (ref 4.7–6.1)
RBC # BLD AUTO: 3.05 M/UL (ref 4.7–6.1)
RBC # BLD AUTO: 3.13 M/UL (ref 4.7–6.1)
RBC # BLD AUTO: 3.14 M/UL (ref 4.7–6.1)
RBC # BLD AUTO: 3.17 M/UL (ref 4.7–6.1)
RBC # BLD AUTO: 3.25 M/UL (ref 4.7–6.1)
RBC # BLD AUTO: 3.35 M/UL (ref 4.7–6.1)
RBC UR QL AUTO: NEGATIVE
SIGNIFICANT IND 70042: ABNORMAL
SIGNIFICANT IND 70042: NORMAL
SITE SITE: ABNORMAL
SITE SITE: NORMAL
SODIUM SERPL-SCNC: 131 MMOL/L (ref 135–145)
SODIUM SERPL-SCNC: 131 MMOL/L (ref 135–145)
SODIUM SERPL-SCNC: 132 MMOL/L (ref 135–145)
SODIUM SERPL-SCNC: 132 MMOL/L (ref 135–145)
SODIUM SERPL-SCNC: 133 MMOL/L (ref 135–145)
SODIUM SERPL-SCNC: 133 MMOL/L (ref 135–145)
SODIUM SERPL-SCNC: 134 MMOL/L (ref 135–145)
SODIUM SERPL-SCNC: 135 MMOL/L (ref 135–145)
SODIUM SERPL-SCNC: 135 MMOL/L (ref 135–145)
SODIUM SERPL-SCNC: 136 MMOL/L (ref 135–145)
SOURCE SOURCE: ABNORMAL
SOURCE SOURCE: NORMAL
SP GR UR STRIP.AUTO: 1.02
T4 FREE SERPL-MCNC: 1.19 NG/DL (ref 0.53–1.43)
T4 FREE SERPL-MCNC: 1.35 NG/DL (ref 0.53–1.43)
TROPONIN T SERPL-MCNC: 15 NG/L (ref 6–19)
TSH SERPL DL<=0.005 MIU/L-ACNC: 1.15 UIU/ML (ref 0.38–5.33)
TSH SERPL DL<=0.005 MIU/L-ACNC: 1.82 UIU/ML (ref 0.38–5.33)
UROBILINOGEN UR STRIP.AUTO-MCNC: 1 MG/DL
WBC # BLD AUTO: 10.3 K/UL (ref 4.8–10.8)
WBC # BLD AUTO: 10.7 K/UL (ref 4.8–10.8)
WBC # BLD AUTO: 11.1 K/UL (ref 4.8–10.8)
WBC # BLD AUTO: 11.1 K/UL (ref 4.8–10.8)
WBC # BLD AUTO: 11.5 K/UL (ref 4.8–10.8)
WBC # BLD AUTO: 12 K/UL (ref 4.8–10.8)
WBC # BLD AUTO: 12.9 K/UL (ref 4.8–10.8)
WBC # BLD AUTO: 14.6 K/UL (ref 4.8–10.8)
WBC # BLD AUTO: 14.9 K/UL (ref 4.8–10.8)
WBC # BLD AUTO: 16.7 K/UL (ref 4.8–10.8)
WBC # BLD AUTO: 9.2 K/UL (ref 4.8–10.8)
WBC # BLD AUTO: 9.9 K/UL (ref 4.8–10.8)

## 2019-01-01 PROCEDURE — 36415 COLL VENOUS BLD VENIPUNCTURE: CPT

## 2019-01-01 PROCEDURE — A9270 NON-COVERED ITEM OR SERVICE: HCPCS | Performed by: INTERNAL MEDICINE

## 2019-01-01 PROCEDURE — 700102 HCHG RX REV CODE 250 W/ 637 OVERRIDE(OP): Performed by: HOSPITALIST

## 2019-01-01 PROCEDURE — 93306 TTE W/DOPPLER COMPLETE: CPT

## 2019-01-01 PROCEDURE — 85025 COMPLETE CBC W/AUTO DIFF WBC: CPT

## 2019-01-01 PROCEDURE — 80053 COMPREHEN METABOLIC PANEL: CPT

## 2019-01-01 PROCEDURE — 700111 HCHG RX REV CODE 636 W/ 250 OVERRIDE (IP): Performed by: RADIOLOGY

## 2019-01-01 PROCEDURE — 83615 LACTATE (LD) (LDH) ENZYME: CPT

## 2019-01-01 PROCEDURE — 700111 HCHG RX REV CODE 636 W/ 250 OVERRIDE (IP): Performed by: EMERGENCY MEDICINE

## 2019-01-01 PROCEDURE — A9270 NON-COVERED ITEM OR SERVICE: HCPCS | Performed by: HOSPITALIST

## 2019-01-01 PROCEDURE — 93306 TTE W/DOPPLER COMPLETE: CPT | Mod: 26 | Performed by: INTERNAL MEDICINE

## 2019-01-01 PROCEDURE — 71260 CT THORAX DX C+: CPT

## 2019-01-01 PROCEDURE — A9585 GADOBUTROL INJECTION: HCPCS | Performed by: INTERNAL MEDICINE

## 2019-01-01 PROCEDURE — 94640 AIRWAY INHALATION TREATMENT: CPT

## 2019-01-01 PROCEDURE — 85610 PROTHROMBIN TIME: CPT

## 2019-01-01 PROCEDURE — 700101 HCHG RX REV CODE 250: Performed by: INTERNAL MEDICINE

## 2019-01-01 PROCEDURE — 84100 ASSAY OF PHOSPHORUS: CPT

## 2019-01-01 PROCEDURE — 94669 MECHANICAL CHEST WALL OSCILL: CPT

## 2019-01-01 PROCEDURE — 700111 HCHG RX REV CODE 636 W/ 250 OVERRIDE (IP): Performed by: HOSPITALIST

## 2019-01-01 PROCEDURE — 94761 N-INVAS EAR/PLS OXIMETRY MLT: CPT | Performed by: INTERNAL MEDICINE

## 2019-01-01 PROCEDURE — 99283 EMERGENCY DEPT VISIT LOW MDM: CPT

## 2019-01-01 PROCEDURE — 770004 HCHG ROOM/CARE - ONCOLOGY PRIVATE *

## 2019-01-01 PROCEDURE — 80048 BASIC METABOLIC PNL TOTAL CA: CPT

## 2019-01-01 PROCEDURE — 700105 HCHG RX REV CODE 258: Performed by: INTERNAL MEDICINE

## 2019-01-01 PROCEDURE — 94668 MNPJ CHEST WALL SBSQ: CPT

## 2019-01-01 PROCEDURE — 700101 HCHG RX REV CODE 250: Performed by: EMERGENCY MEDICINE

## 2019-01-01 PROCEDURE — 770020 HCHG ROOM/CARE - TELE (206)

## 2019-01-01 PROCEDURE — 99233 SBSQ HOSP IP/OBS HIGH 50: CPT | Performed by: HOSPITALIST

## 2019-01-01 PROCEDURE — 31720 CLEARANCE OF AIRWAYS: CPT

## 2019-01-01 PROCEDURE — 84145 PROCALCITONIN (PCT): CPT

## 2019-01-01 PROCEDURE — 700117 HCHG RX CONTRAST REV CODE 255: Performed by: INTERNAL MEDICINE

## 2019-01-01 PROCEDURE — 97165 OT EVAL LOW COMPLEX 30 MIN: CPT

## 2019-01-01 PROCEDURE — 71045 X-RAY EXAM CHEST 1 VIEW: CPT

## 2019-01-01 PROCEDURE — 700102 HCHG RX REV CODE 250 W/ 637 OVERRIDE(OP): Performed by: INTERNAL MEDICINE

## 2019-01-01 PROCEDURE — 770009 HCHG ROOM/CARE - ONCOLOGY SEMI PRI*

## 2019-01-01 PROCEDURE — 74160 CT ABDOMEN W/CONTRAST: CPT

## 2019-01-01 PROCEDURE — 99232 SBSQ HOSP IP/OBS MODERATE 35: CPT | Performed by: INTERNAL MEDICINE

## 2019-01-01 PROCEDURE — 700105 HCHG RX REV CODE 258: Performed by: RADIOLOGY

## 2019-01-01 PROCEDURE — 88342 IMHCHEM/IMCYTCHM 1ST ANTB: CPT

## 2019-01-01 PROCEDURE — 700111 HCHG RX REV CODE 636 W/ 250 OVERRIDE (IP)

## 2019-01-01 PROCEDURE — 71275 CT ANGIOGRAPHY CHEST: CPT

## 2019-01-01 PROCEDURE — 88341 IMHCHEM/IMCYTCHM EA ADD ANTB: CPT

## 2019-01-01 PROCEDURE — 83735 ASSAY OF MAGNESIUM: CPT

## 2019-01-01 PROCEDURE — 700105 HCHG RX REV CODE 258: Performed by: HOSPITALIST

## 2019-01-01 PROCEDURE — 700111 HCHG RX REV CODE 636 W/ 250 OVERRIDE (IP): Performed by: INTERNAL MEDICINE

## 2019-01-01 PROCEDURE — 700101 HCHG RX REV CODE 250: Performed by: HOSPITALIST

## 2019-01-01 PROCEDURE — 99233 SBSQ HOSP IP/OBS HIGH 50: CPT | Mod: 25 | Performed by: HOSPITALIST

## 2019-01-01 PROCEDURE — 97161 PT EVAL LOW COMPLEX 20 MIN: CPT

## 2019-01-01 PROCEDURE — 99214 OFFICE O/P EST MOD 30 MIN: CPT | Performed by: INTERNAL MEDICINE

## 2019-01-01 PROCEDURE — 99223 1ST HOSP IP/OBS HIGH 75: CPT | Performed by: INTERNAL MEDICINE

## 2019-01-01 PROCEDURE — A9270 NON-COVERED ITEM OR SERVICE: HCPCS | Performed by: EMERGENCY MEDICINE

## 2019-01-01 PROCEDURE — A9552 F18 FDG: HCPCS

## 2019-01-01 PROCEDURE — 87070 CULTURE OTHR SPECIMN AEROBIC: CPT

## 2019-01-01 PROCEDURE — 99222 1ST HOSP IP/OBS MODERATE 55: CPT | Mod: GC | Performed by: INTERNAL MEDICINE

## 2019-01-01 PROCEDURE — 87086 URINE CULTURE/COLONY COUNT: CPT

## 2019-01-01 PROCEDURE — 94667 MNPJ CHEST WALL 1ST: CPT

## 2019-01-01 PROCEDURE — 70553 MRI BRAIN STEM W/O & W/DYE: CPT

## 2019-01-01 PROCEDURE — 93971 EXTREMITY STUDY: CPT | Mod: RT

## 2019-01-01 PROCEDURE — 94760 N-INVAS EAR/PLS OXIMETRY 1: CPT

## 2019-01-01 PROCEDURE — 99285 EMERGENCY DEPT VISIT HI MDM: CPT

## 2019-01-01 PROCEDURE — 81003 URINALYSIS AUTO W/O SCOPE: CPT

## 2019-01-01 PROCEDURE — 700117 HCHG RX CONTRAST REV CODE 255: Performed by: EMERGENCY MEDICINE

## 2019-01-01 PROCEDURE — 96365 THER/PROPH/DIAG IV INF INIT: CPT

## 2019-01-01 PROCEDURE — 87040 BLOOD CULTURE FOR BACTERIA: CPT | Mod: 91

## 2019-01-01 PROCEDURE — 99232 SBSQ HOSP IP/OBS MODERATE 35: CPT | Performed by: HOSPITALIST

## 2019-01-01 PROCEDURE — 85049 AUTOMATED PLATELET COUNT: CPT

## 2019-01-01 PROCEDURE — 700105 HCHG RX REV CODE 258: Performed by: EMERGENCY MEDICINE

## 2019-01-01 PROCEDURE — 87040 BLOOD CULTURE FOR BACTERIA: CPT

## 2019-01-01 PROCEDURE — 83605 ASSAY OF LACTIC ACID: CPT

## 2019-01-01 PROCEDURE — 96367 TX/PROPH/DG ADDL SEQ IV INF: CPT

## 2019-01-01 PROCEDURE — 77012 CT SCAN FOR NEEDLE BIOPSY: CPT | Mod: RT

## 2019-01-01 PROCEDURE — 84439 ASSAY OF FREE THYROXINE: CPT

## 2019-01-01 PROCEDURE — 93970 EXTREMITY STUDY: CPT

## 2019-01-01 PROCEDURE — 84443 ASSAY THYROID STIM HORMONE: CPT

## 2019-01-01 PROCEDURE — 83880 ASSAY OF NATRIURETIC PEPTIDE: CPT

## 2019-01-01 PROCEDURE — 93970 EXTREMITY STUDY: CPT | Mod: 26 | Performed by: INTERNAL MEDICINE

## 2019-01-01 PROCEDURE — 83735 ASSAY OF MAGNESIUM: CPT | Mod: GA

## 2019-01-01 PROCEDURE — 87205 SMEAR GRAM STAIN: CPT

## 2019-01-01 PROCEDURE — 99233 SBSQ HOSP IP/OBS HIGH 50: CPT | Performed by: INTERNAL MEDICINE

## 2019-01-01 PROCEDURE — 94664 DEMO&/EVAL PT USE INHALER: CPT

## 2019-01-01 PROCEDURE — 87077 CULTURE AEROBIC IDENTIFY: CPT

## 2019-01-01 PROCEDURE — 88341 IMHCHEM/IMCYTCHM EA ADD ANTB: CPT | Mod: 91

## 2019-01-01 PROCEDURE — 93005 ELECTROCARDIOGRAM TRACING: CPT | Performed by: EMERGENCY MEDICINE

## 2019-01-01 PROCEDURE — 83605 ASSAY OF LACTIC ACID: CPT | Mod: 91

## 2019-01-01 PROCEDURE — 87186 SC STD MICRODIL/AGAR DIL: CPT

## 2019-01-01 PROCEDURE — 88305 TISSUE EXAM BY PATHOLOGIST: CPT

## 2019-01-01 PROCEDURE — 160002 HCHG RECOVERY MINUTES (STAT)

## 2019-01-01 PROCEDURE — 99239 HOSP IP/OBS DSCHRG MGMT >30: CPT | Performed by: HOSPITALIST

## 2019-01-01 PROCEDURE — 99497 ADVNCD CARE PLAN 30 MIN: CPT | Performed by: HOSPITALIST

## 2019-01-01 PROCEDURE — 88323 CONSLTJ&REPRT MATRL PREP SLD: CPT

## 2019-01-01 PROCEDURE — 99204 OFFICE O/P NEW MOD 45 MIN: CPT | Performed by: FAMILY MEDICINE

## 2019-01-01 PROCEDURE — 700102 HCHG RX REV CODE 250 W/ 637 OVERRIDE(OP): Performed by: EMERGENCY MEDICINE

## 2019-01-01 PROCEDURE — 700112 HCHG RX REV CODE 229: Performed by: HOSPITALIST

## 2019-01-01 PROCEDURE — 93005 ELECTROCARDIOGRAM TRACING: CPT

## 2019-01-01 PROCEDURE — 84484 ASSAY OF TROPONIN QUANT: CPT

## 2019-01-01 PROCEDURE — 99204 OFFICE O/P NEW MOD 45 MIN: CPT | Performed by: INTERNAL MEDICINE

## 2019-01-01 RX ORDER — ACETAMINOPHEN 325 MG/1
650 TABLET ORAL EVERY 6 HOURS PRN
Status: DISCONTINUED | OUTPATIENT
Start: 2019-01-01 | End: 2019-10-01 | Stop reason: HOSPADM

## 2019-01-01 RX ORDER — IPRATROPIUM BROMIDE AND ALBUTEROL SULFATE 2.5; .5 MG/3ML; MG/3ML
3 SOLUTION RESPIRATORY (INHALATION) EVERY 4 HOURS PRN
Refills: 1 | Status: ON HOLD | COMMUNITY
End: 2019-01-01 | Stop reason: SDUPTHER

## 2019-01-01 RX ORDER — GUAIFENESIN/DEXTROMETHORPHAN 100-10MG/5
10 SYRUP ORAL EVERY 6 HOURS PRN
Status: DISCONTINUED | OUTPATIENT
Start: 2019-01-01 | End: 2019-01-01 | Stop reason: HOSPADM

## 2019-01-01 RX ORDER — ACETYLCYSTEINE 200 MG/ML
3 SOLUTION ORAL; RESPIRATORY (INHALATION)
Status: DISCONTINUED | OUTPATIENT
Start: 2019-01-01 | End: 2019-01-01

## 2019-01-01 RX ORDER — ALBUTEROL SULFATE 90 UG/1
2 AEROSOL, METERED RESPIRATORY (INHALATION) EVERY 4 HOURS PRN
Status: DISCONTINUED | OUTPATIENT
Start: 2019-01-01 | End: 2019-01-01 | Stop reason: HOSPADM

## 2019-01-01 RX ORDER — POLYVINYL ALCOHOL 14 MG/ML
2 SOLUTION/ DROPS OPHTHALMIC EVERY 6 HOURS PRN
Status: DISCONTINUED | OUTPATIENT
Start: 2019-01-01 | End: 2019-10-01 | Stop reason: HOSPADM

## 2019-01-01 RX ORDER — ONDANSETRON 8 MG/1
8 TABLET, ORALLY DISINTEGRATING ORAL EVERY 8 HOURS PRN
COMMUNITY
End: 2019-01-01

## 2019-01-01 RX ORDER — GABAPENTIN 300 MG/1
300 CAPSULE ORAL 3 TIMES DAILY
Status: DISCONTINUED | OUTPATIENT
Start: 2019-01-01 | End: 2019-01-01

## 2019-01-01 RX ORDER — ALBUTEROL SULFATE 90 UG/1
2 AEROSOL, METERED RESPIRATORY (INHALATION) EVERY 4 HOURS PRN
Qty: 8.5 G | Refills: 3 | Status: SHIPPED | OUTPATIENT
Start: 2019-01-01

## 2019-01-01 RX ORDER — IPRATROPIUM BROMIDE AND ALBUTEROL SULFATE 2.5; .5 MG/3ML; MG/3ML
3 SOLUTION RESPIRATORY (INHALATION)
Status: DISCONTINUED | OUTPATIENT
Start: 2019-01-01 | End: 2019-01-01

## 2019-01-01 RX ORDER — DRONABINOL 5 MG/1
5 CAPSULE ORAL 2 TIMES DAILY
Qty: 60 CAP | Refills: 0 | Status: SHIPPED | OUTPATIENT
Start: 2019-01-01 | End: 2019-01-01

## 2019-01-01 RX ORDER — ACETYLCYSTEINE 200 MG/ML
3 SOLUTION ORAL; RESPIRATORY (INHALATION) 4 TIMES DAILY
Status: DISCONTINUED | OUTPATIENT
Start: 2019-01-01 | End: 2019-01-01

## 2019-01-01 RX ORDER — MORPHINE SULFATE 15 MG/1
15 TABLET, FILM COATED, EXTENDED RELEASE ORAL 2 TIMES DAILY
Refills: 0 | COMMUNITY
Start: 2019-01-01 | End: 2019-01-01

## 2019-01-01 RX ORDER — ONDANSETRON 2 MG/ML
4 INJECTION INTRAMUSCULAR; INTRAVENOUS PRN
Status: DISCONTINUED | OUTPATIENT
Start: 2019-01-01 | End: 2019-01-01 | Stop reason: HOSPADM

## 2019-01-01 RX ORDER — AMOXICILLIN 250 MG
2 CAPSULE ORAL 2 TIMES DAILY
Status: DISCONTINUED | OUTPATIENT
Start: 2019-01-01 | End: 2019-01-01 | Stop reason: HOSPADM

## 2019-01-01 RX ORDER — METRONIDAZOLE 500 MG/1
500 TABLET ORAL EVERY 8 HOURS
Status: DISCONTINUED | OUTPATIENT
Start: 2019-01-01 | End: 2019-01-01

## 2019-01-01 RX ORDER — DRONABINOL 5 MG/1
5 CAPSULE ORAL
Status: DISCONTINUED | OUTPATIENT
Start: 2019-01-01 | End: 2019-01-01 | Stop reason: HOSPADM

## 2019-01-01 RX ORDER — FUROSEMIDE 20 MG/1
20 TABLET ORAL
Status: DISCONTINUED | OUTPATIENT
Start: 2019-01-01 | End: 2019-01-01

## 2019-01-01 RX ORDER — OXYCODONE HYDROCHLORIDE 5 MG/1
2.5 TABLET ORAL
Status: DISCONTINUED | OUTPATIENT
Start: 2019-01-01 | End: 2019-01-01

## 2019-01-01 RX ORDER — LORAZEPAM 2 MG/ML
1 CONCENTRATE ORAL
Status: DISCONTINUED | OUTPATIENT
Start: 2019-01-01 | End: 2019-10-01 | Stop reason: HOSPADM

## 2019-01-01 RX ORDER — AMOXICILLIN AND CLAVULANATE POTASSIUM 875; 125 MG/1; MG/1
1 TABLET, FILM COATED ORAL 2 TIMES DAILY
Status: ON HOLD | COMMUNITY
Start: 2019-01-01 | End: 2019-01-01

## 2019-01-01 RX ORDER — AMOXICILLIN AND CLAVULANATE POTASSIUM 875; 125 MG/1; MG/1
1 TABLET, FILM COATED ORAL EVERY 12 HOURS
Status: DISCONTINUED | OUTPATIENT
Start: 2019-01-01 | End: 2019-01-01 | Stop reason: HOSPADM

## 2019-01-01 RX ORDER — SODIUM CHLORIDE 9 MG/ML
500 INJECTION, SOLUTION INTRAVENOUS
Status: DISCONTINUED | OUTPATIENT
Start: 2019-01-01 | End: 2019-01-01 | Stop reason: HOSPADM

## 2019-01-01 RX ORDER — ACETAMINOPHEN 325 MG/1
650 TABLET ORAL EVERY 6 HOURS PRN
Status: DISCONTINUED | OUTPATIENT
Start: 2019-01-01 | End: 2019-01-01 | Stop reason: HOSPADM

## 2019-01-01 RX ORDER — GADOBUTROL 604.72 MG/ML
10 INJECTION INTRAVENOUS ONCE
Status: COMPLETED | OUTPATIENT
Start: 2019-01-01 | End: 2019-01-01

## 2019-01-01 RX ORDER — ONDANSETRON 4 MG/1
4 TABLET, ORALLY DISINTEGRATING ORAL EVERY 4 HOURS PRN
Status: DISCONTINUED | OUTPATIENT
Start: 2019-01-01 | End: 2019-01-01 | Stop reason: HOSPADM

## 2019-01-01 RX ORDER — FUROSEMIDE 20 MG/1
20 TABLET ORAL DAILY
Qty: 30 TAB | Refills: 3 | Status: SHIPPED | OUTPATIENT
Start: 2019-01-01

## 2019-01-01 RX ORDER — ACETAMINOPHEN 500 MG
500 TABLET ORAL EVERY 6 HOURS PRN
COMMUNITY
End: 2019-01-01

## 2019-01-01 RX ORDER — AMOXICILLIN 250 MG
2 CAPSULE ORAL 2 TIMES DAILY
Status: DISCONTINUED | OUTPATIENT
Start: 2019-01-01 | End: 2019-10-01 | Stop reason: HOSPADM

## 2019-01-01 RX ORDER — POLYETHYLENE GLYCOL 3350 17 G/17G
1 POWDER, FOR SOLUTION ORAL
Status: DISCONTINUED | OUTPATIENT
Start: 2019-01-01 | End: 2019-01-01 | Stop reason: HOSPADM

## 2019-01-01 RX ORDER — OXYCODONE HYDROCHLORIDE 10 MG/1
10 TABLET ORAL
Status: DISCONTINUED | OUTPATIENT
Start: 2019-01-01 | End: 2019-01-01 | Stop reason: HOSPADM

## 2019-01-01 RX ORDER — ALBUTEROL SULFATE 90 UG/1
2 AEROSOL, METERED RESPIRATORY (INHALATION) EVERY 4 HOURS PRN
Status: ON HOLD | COMMUNITY
End: 2019-01-01

## 2019-01-01 RX ORDER — SODIUM CHLORIDE 9 MG/ML
1000 INJECTION, SOLUTION INTRAVENOUS ONCE
Status: COMPLETED | OUTPATIENT
Start: 2019-01-01 | End: 2019-01-01

## 2019-01-01 RX ORDER — FLUTICASONE PROPIONATE 50 MCG
1 SPRAY, SUSPENSION (ML) NASAL DAILY
Qty: 16 G | Refills: 0 | Status: SHIPPED | OUTPATIENT
Start: 2019-01-01

## 2019-01-01 RX ORDER — ONDANSETRON 4 MG/1
4 TABLET, ORALLY DISINTEGRATING ORAL EVERY 4 HOURS PRN
Status: DISCONTINUED | OUTPATIENT
Start: 2019-01-01 | End: 2019-01-01

## 2019-01-01 RX ORDER — MORPHINE SULFATE 4 MG/ML
2 INJECTION, SOLUTION INTRAMUSCULAR; INTRAVENOUS
Status: DISCONTINUED | OUTPATIENT
Start: 2019-01-01 | End: 2019-01-01

## 2019-01-01 RX ORDER — ONDANSETRON 2 MG/ML
4 INJECTION INTRAMUSCULAR; INTRAVENOUS EVERY 4 HOURS PRN
Status: DISCONTINUED | OUTPATIENT
Start: 2019-01-01 | End: 2019-01-01

## 2019-01-01 RX ORDER — POTASSIUM CHLORIDE 20 MEQ/1
40 TABLET, EXTENDED RELEASE ORAL DAILY
Status: DISCONTINUED | OUTPATIENT
Start: 2019-01-01 | End: 2019-01-01

## 2019-01-01 RX ORDER — FUROSEMIDE 10 MG/ML
40 INJECTION INTRAMUSCULAR; INTRAVENOUS
Status: DISCONTINUED | OUTPATIENT
Start: 2019-01-01 | End: 2019-01-01

## 2019-01-01 RX ORDER — OXYCODONE HYDROCHLORIDE 5 MG/1
2.5 TABLET ORAL
Status: DISCONTINUED | OUTPATIENT
Start: 2019-01-01 | End: 2019-01-01 | Stop reason: HOSPADM

## 2019-01-01 RX ORDER — BISACODYL 10 MG
10 SUPPOSITORY, RECTAL RECTAL
Status: DISCONTINUED | OUTPATIENT
Start: 2019-01-01 | End: 2019-10-01 | Stop reason: HOSPADM

## 2019-01-01 RX ORDER — COVID-19 ANTIGEN TEST
1 KIT MISCELLANEOUS
COMMUNITY
End: 2019-01-01

## 2019-01-01 RX ORDER — SODIUM CHLORIDE, SODIUM LACTATE, POTASSIUM CHLORIDE, CALCIUM CHLORIDE 600; 310; 30; 20 MG/100ML; MG/100ML; MG/100ML; MG/100ML
INJECTION, SOLUTION INTRAVENOUS CONTINUOUS
Status: DISCONTINUED | OUTPATIENT
Start: 2019-01-01 | End: 2019-01-01

## 2019-01-01 RX ORDER — AMOXICILLIN AND CLAVULANATE POTASSIUM 875; 125 MG/1; MG/1
1 TABLET, FILM COATED ORAL EVERY 12 HOURS
Qty: 4 TAB | Refills: 0 | Status: SHIPPED | OUTPATIENT
Start: 2019-01-01 | End: 2019-01-01

## 2019-01-01 RX ORDER — FUROSEMIDE 10 MG/ML
40 INJECTION INTRAMUSCULAR; INTRAVENOUS ONCE
Status: DISCONTINUED | OUTPATIENT
Start: 2019-01-01 | End: 2019-01-01 | Stop reason: HOSPADM

## 2019-01-01 RX ORDER — GABAPENTIN 300 MG/1
300 CAPSULE ORAL 3 TIMES DAILY
Refills: 0 | COMMUNITY

## 2019-01-01 RX ORDER — ONDANSETRON 2 MG/ML
4 INJECTION INTRAMUSCULAR; INTRAVENOUS EVERY 4 HOURS PRN
Status: DISCONTINUED | OUTPATIENT
Start: 2019-01-01 | End: 2019-01-01 | Stop reason: HOSPADM

## 2019-01-01 RX ORDER — OXYCODONE HYDROCHLORIDE 5 MG/1
5 TABLET ORAL
Status: DISCONTINUED | OUTPATIENT
Start: 2019-01-01 | End: 2019-01-01 | Stop reason: HOSPADM

## 2019-01-01 RX ORDER — SODIUM CHLORIDE 9 MG/ML
1000 INJECTION, SOLUTION INTRAVENOUS
Status: DISCONTINUED | OUTPATIENT
Start: 2019-01-01 | End: 2019-01-01 | Stop reason: HOSPADM

## 2019-01-01 RX ORDER — MIDAZOLAM HYDROCHLORIDE 1 MG/ML
.5-2 INJECTION INTRAMUSCULAR; INTRAVENOUS PRN
Status: DISCONTINUED | OUTPATIENT
Start: 2019-01-01 | End: 2019-01-01 | Stop reason: HOSPADM

## 2019-01-01 RX ORDER — GABAPENTIN 300 MG/1
300 CAPSULE ORAL 2 TIMES DAILY
Status: DISCONTINUED | OUTPATIENT
Start: 2019-01-01 | End: 2019-01-01

## 2019-01-01 RX ORDER — GABAPENTIN 300 MG/1
600 CAPSULE ORAL EVERY EVENING
Status: DISCONTINUED | OUTPATIENT
Start: 2019-01-01 | End: 2019-01-01

## 2019-01-01 RX ORDER — MORPHINE SULFATE 60 MG/1
60 TABLET, FILM COATED, EXTENDED RELEASE ORAL EVERY 12 HOURS
COMMUNITY

## 2019-01-01 RX ORDER — MORPHINE SULFATE 15 MG/1
15 TABLET, FILM COATED, EXTENDED RELEASE ORAL EVERY 12 HOURS
Status: DISCONTINUED | OUTPATIENT
Start: 2019-01-01 | End: 2019-01-01 | Stop reason: HOSPADM

## 2019-01-01 RX ORDER — POTASSIUM CHLORIDE 20 MEQ/1
40 TABLET, EXTENDED RELEASE ORAL 2 TIMES DAILY
Status: DISCONTINUED | OUTPATIENT
Start: 2019-01-01 | End: 2019-01-01

## 2019-01-01 RX ORDER — ATROPINE SULFATE 10 MG/ML
2 SOLUTION/ DROPS OPHTHALMIC EVERY 4 HOURS PRN
Status: DISCONTINUED | OUTPATIENT
Start: 2019-01-01 | End: 2019-10-01 | Stop reason: HOSPADM

## 2019-01-01 RX ORDER — MORPHINE SULFATE 10 MG/ML
5 INJECTION, SOLUTION INTRAMUSCULAR; INTRAVENOUS
Status: DISCONTINUED | OUTPATIENT
Start: 2019-01-01 | End: 2019-10-01 | Stop reason: HOSPADM

## 2019-01-01 RX ORDER — OXYCODONE HYDROCHLORIDE 5 MG/1
5 TABLET ORAL
Status: DISCONTINUED | OUTPATIENT
Start: 2019-01-01 | End: 2019-01-01

## 2019-01-01 RX ORDER — HYDROMORPHONE HYDROCHLORIDE 2 MG/ML
0.25 INJECTION, SOLUTION INTRAMUSCULAR; INTRAVENOUS; SUBCUTANEOUS
Status: DISCONTINUED | OUTPATIENT
Start: 2019-01-01 | End: 2019-01-01 | Stop reason: HOSPADM

## 2019-01-01 RX ORDER — GUAIFENESIN AND DEXTROMETHORPHAN HYDROBROMIDE 1200; 60 MG/1; MG/1
1 TABLET, EXTENDED RELEASE ORAL PRN
COMMUNITY
End: 2019-01-01

## 2019-01-01 RX ORDER — IPRATROPIUM BROMIDE AND ALBUTEROL SULFATE 2.5; .5 MG/3ML; MG/3ML
3 SOLUTION RESPIRATORY (INHALATION) 4 TIMES DAILY
Status: DISCONTINUED | OUTPATIENT
Start: 2019-01-01 | End: 2019-01-01

## 2019-01-01 RX ORDER — DEXTROMETHORPHAN HBR. AND GUAIFENESIN 10; 100 MG/5ML; MG/5ML
10 SOLUTION ORAL EVERY 4 HOURS PRN
COMMUNITY
End: 2019-01-01

## 2019-01-01 RX ORDER — IBUPROFEN 400 MG/1
600 TABLET ORAL EVERY 6 HOURS PRN
Status: DISCONTINUED | OUTPATIENT
Start: 2019-01-01 | End: 2019-10-01 | Stop reason: HOSPADM

## 2019-01-01 RX ORDER — IBUPROFEN 200 MG
200 TABLET ORAL
COMMUNITY
End: 2019-01-01

## 2019-01-01 RX ORDER — IPRATROPIUM BROMIDE AND ALBUTEROL SULFATE 2.5; .5 MG/3ML; MG/3ML
3 SOLUTION RESPIRATORY (INHALATION) EVERY 4 HOURS PRN
Qty: 30 BULLET | Refills: 1 | Status: SHIPPED | OUTPATIENT
Start: 2019-01-01

## 2019-01-01 RX ORDER — DOCUSATE SODIUM 100 MG/1
100 CAPSULE, LIQUID FILLED ORAL EVERY 12 HOURS
Status: DISCONTINUED | OUTPATIENT
Start: 2019-01-01 | End: 2019-10-01 | Stop reason: HOSPADM

## 2019-01-01 RX ORDER — POLYETHYLENE GLYCOL 3350 17 G/17G
1 POWDER, FOR SOLUTION ORAL
Status: DISCONTINUED | OUTPATIENT
Start: 2019-01-01 | End: 2019-10-01 | Stop reason: HOSPADM

## 2019-01-01 RX ORDER — IPRATROPIUM BROMIDE AND ALBUTEROL SULFATE 2.5; .5 MG/3ML; MG/3ML
3 SOLUTION RESPIRATORY (INHALATION)
Status: DISCONTINUED | OUTPATIENT
Start: 2019-01-01 | End: 2019-01-01 | Stop reason: HOSPADM

## 2019-01-01 RX ORDER — FUROSEMIDE 20 MG/1
20 TABLET ORAL
Refills: 0 | Status: ON HOLD | COMMUNITY
End: 2019-01-01 | Stop reason: SDUPTHER

## 2019-01-01 RX ORDER — MORPHINE SULFATE 4 MG/ML
4 INJECTION, SOLUTION INTRAMUSCULAR; INTRAVENOUS
Status: DISCONTINUED | OUTPATIENT
Start: 2019-01-01 | End: 2019-01-01 | Stop reason: HOSPADM

## 2019-01-01 RX ORDER — ONDANSETRON 8 MG/1
8 TABLET, ORALLY DISINTEGRATING ORAL EVERY 8 HOURS PRN
Status: DISCONTINUED | OUTPATIENT
Start: 2019-01-01 | End: 2019-10-01 | Stop reason: HOSPADM

## 2019-01-01 RX ORDER — LEVALBUTEROL INHALATION SOLUTION 0.63 MG/3ML
0.63 SOLUTION RESPIRATORY (INHALATION)
Status: DISCONTINUED | OUTPATIENT
Start: 2019-01-01 | End: 2019-01-01 | Stop reason: HOSPADM

## 2019-01-01 RX ORDER — GUAIFENESIN 600 MG/1
600 TABLET, EXTENDED RELEASE ORAL EVERY 12 HOURS
Qty: 28 TAB | Refills: 0 | Status: SHIPPED | OUTPATIENT
Start: 2019-01-01 | End: 2019-10-03

## 2019-01-01 RX ORDER — BISACODYL 10 MG
10 SUPPOSITORY, RECTAL RECTAL
Status: DISCONTINUED | OUTPATIENT
Start: 2019-01-01 | End: 2019-01-01 | Stop reason: HOSPADM

## 2019-01-01 RX ORDER — MORPHINE SULFATE 60 MG/1
60 TABLET, FILM COATED, EXTENDED RELEASE ORAL EVERY 12 HOURS
Status: DISCONTINUED | OUTPATIENT
Start: 2019-01-01 | End: 2019-01-01 | Stop reason: HOSPADM

## 2019-01-01 RX ORDER — HYDROCODONE BITARTRATE AND ACETAMINOPHEN 7.5; 325 MG/1; MG/1
1 TABLET ORAL EVERY 4 HOURS PRN
Refills: 0 | COMMUNITY

## 2019-01-01 RX ORDER — MIDAZOLAM HYDROCHLORIDE 1 MG/ML
INJECTION INTRAMUSCULAR; INTRAVENOUS
Status: COMPLETED
Start: 2019-01-01 | End: 2019-01-01

## 2019-01-01 RX ORDER — LORAZEPAM 2 MG/ML
1 INJECTION INTRAMUSCULAR
Status: DISCONTINUED | OUTPATIENT
Start: 2019-01-01 | End: 2019-10-01 | Stop reason: HOSPADM

## 2019-01-01 RX ORDER — GUAIFENESIN 600 MG/1
1200 TABLET, EXTENDED RELEASE ORAL EVERY 12 HOURS
Status: DISCONTINUED | OUTPATIENT
Start: 2019-01-01 | End: 2019-01-01

## 2019-01-01 RX ORDER — MORPHINE SULFATE 30 MG/1
30 TABLET, FILM COATED, EXTENDED RELEASE ORAL 2 TIMES DAILY
Refills: 0 | COMMUNITY
Start: 2019-01-01 | End: 2019-01-01

## 2019-01-01 RX ORDER — SODIUM CHLORIDE, SODIUM LACTATE, POTASSIUM CHLORIDE, AND CALCIUM CHLORIDE .6; .31; .03; .02 G/100ML; G/100ML; G/100ML; G/100ML
500 INJECTION, SOLUTION INTRAVENOUS
Status: ACTIVE | OUTPATIENT
Start: 2019-01-01 | End: 2019-01-01

## 2019-01-01 RX ORDER — ONDANSETRON 2 MG/ML
8 INJECTION INTRAMUSCULAR; INTRAVENOUS EVERY 8 HOURS PRN
Status: DISCONTINUED | OUTPATIENT
Start: 2019-01-01 | End: 2019-10-01 | Stop reason: HOSPADM

## 2019-01-01 RX ORDER — POTASSIUM CHLORIDE 20 MEQ/1
20 TABLET, EXTENDED RELEASE ORAL DAILY
Status: DISCONTINUED | OUTPATIENT
Start: 2019-01-01 | End: 2019-01-01

## 2019-01-01 RX ORDER — HEPARIN SODIUM 5000 [USP'U]/ML
5000 INJECTION, SOLUTION INTRAVENOUS; SUBCUTANEOUS EVERY 8 HOURS
Status: DISCONTINUED | OUTPATIENT
Start: 2019-01-01 | End: 2019-01-01 | Stop reason: HOSPADM

## 2019-01-01 RX ORDER — GABAPENTIN 300 MG/1
600 CAPSULE ORAL 3 TIMES DAILY
Status: DISCONTINUED | OUTPATIENT
Start: 2019-01-01 | End: 2019-01-01 | Stop reason: HOSPADM

## 2019-01-01 RX ORDER — MORPHINE SULFATE 15 MG/1
15 TABLET, FILM COATED, EXTENDED RELEASE ORAL EVERY 12 HOURS
Qty: 20 TAB | Refills: 0 | Status: SHIPPED | OUTPATIENT
Start: 2019-01-01 | End: 2019-01-01

## 2019-01-01 RX ORDER — POTASSIUM CHLORIDE 20 MEQ/1
20 TABLET, EXTENDED RELEASE ORAL 2 TIMES DAILY
Refills: 0 | COMMUNITY
Start: 2019-01-01 | End: 2019-01-01

## 2019-01-01 RX ORDER — AMOXICILLIN AND CLAVULANATE POTASSIUM 875; 125 MG/1; MG/1
1 TABLET, FILM COATED ORAL 2 TIMES DAILY
COMMUNITY
Start: 2019-01-01

## 2019-01-01 RX ORDER — IPRATROPIUM BROMIDE AND ALBUTEROL SULFATE 2.5; .5 MG/3ML; MG/3ML
3 SOLUTION RESPIRATORY (INHALATION)
Status: DISCONTINUED | OUTPATIENT
Start: 2019-01-01 | End: 2019-10-01 | Stop reason: HOSPADM

## 2019-01-01 RX ORDER — MORPHINE SULFATE 60 MG/1
60 TABLET, FILM COATED, EXTENDED RELEASE ORAL EVERY 12 HOURS
Status: DISCONTINUED | OUTPATIENT
Start: 2019-01-01 | End: 2019-10-01 | Stop reason: HOSPADM

## 2019-01-01 RX ORDER — MORPHINE SULFATE 10 MG/ML
10 INJECTION, SOLUTION INTRAMUSCULAR; INTRAVENOUS
Status: DISCONTINUED | OUTPATIENT
Start: 2019-01-01 | End: 2019-10-01 | Stop reason: HOSPADM

## 2019-01-01 RX ADMIN — OXYCODONE HYDROCHLORIDE 10 MG: 10 TABLET ORAL at 00:58

## 2019-01-01 RX ADMIN — CEFEPIME 2 G: 2 INJECTION, POWDER, FOR SOLUTION INTRAVENOUS at 20:38

## 2019-01-01 RX ADMIN — ENOXAPARIN SODIUM 40 MG: 100 INJECTION SUBCUTANEOUS at 05:29

## 2019-01-01 RX ADMIN — IPRATROPIUM BROMIDE AND ALBUTEROL SULFATE 3 ML: .5; 3 SOLUTION RESPIRATORY (INHALATION) at 18:46

## 2019-01-01 RX ADMIN — METRONIDAZOLE 500 MG: 500 TABLET, FILM COATED ORAL at 21:31

## 2019-01-01 RX ADMIN — PIPERACILLIN AND TAZOBACTAM 4.5 G: 4; .5 INJECTION, POWDER, LYOPHILIZED, FOR SOLUTION INTRAVENOUS; PARENTERAL at 22:17

## 2019-01-01 RX ADMIN — IPRATROPIUM BROMIDE AND ALBUTEROL SULFATE 3 ML: .5; 3 SOLUTION RESPIRATORY (INHALATION) at 02:20

## 2019-01-01 RX ADMIN — PIPERACILLIN SODIUM AND TAZOBACTAM SODIUM 3.38 G: 3; .375 INJECTION, POWDER, FOR SOLUTION INTRAVENOUS at 00:36

## 2019-01-01 RX ADMIN — HEPARIN SODIUM 5000 UNITS: 5000 INJECTION INTRAVENOUS; SUBCUTANEOUS at 05:08

## 2019-01-01 RX ADMIN — OXYCODONE HYDROCHLORIDE 10 MG: 10 TABLET ORAL at 00:50

## 2019-01-01 RX ADMIN — MORPHINE SULFATE 60 MG: 60 TABLET, EXTENDED RELEASE ORAL at 09:40

## 2019-01-01 RX ADMIN — SENNOSIDES, DOCUSATE SODIUM 2 TABLET: 50; 8.6 TABLET, FILM COATED ORAL at 17:18

## 2019-01-01 RX ADMIN — IPRATROPIUM BROMIDE AND ALBUTEROL SULFATE 3 ML: .5; 3 SOLUTION RESPIRATORY (INHALATION) at 16:11

## 2019-01-01 RX ADMIN — SENNOSIDES, DOCUSATE SODIUM 2 TABLET: 50; 8.6 TABLET, FILM COATED ORAL at 17:28

## 2019-01-01 RX ADMIN — MORPHINE SULFATE 2 MG: 4 INJECTION INTRAVENOUS at 14:01

## 2019-01-01 RX ADMIN — IOHEXOL 100 ML: 350 INJECTION, SOLUTION INTRAVENOUS at 11:45

## 2019-01-01 RX ADMIN — PIPERACILLIN SODIUM AND TAZOBACTAM SODIUM 3.38 G: 3; .375 INJECTION, POWDER, FOR SOLUTION INTRAVENOUS at 16:35

## 2019-01-01 RX ADMIN — OXYCODONE HYDROCHLORIDE 5 MG: 5 TABLET ORAL at 08:12

## 2019-01-01 RX ADMIN — GABAPENTIN 300 MG: 300 CAPSULE ORAL at 11:37

## 2019-01-01 RX ADMIN — ACETYLCYSTEINE 3 ML: 200 INHALANT RESPIRATORY (INHALATION) at 02:20

## 2019-01-01 RX ADMIN — CEFEPIME 2 G: 2 INJECTION, POWDER, FOR SOLUTION INTRAVENOUS at 21:48

## 2019-01-01 RX ADMIN — OXYCODONE HYDROCHLORIDE 10 MG: 10 TABLET ORAL at 14:04

## 2019-01-01 RX ADMIN — OXYCODONE HYDROCHLORIDE 5 MG: 5 TABLET ORAL at 15:41

## 2019-01-01 RX ADMIN — OXYCODONE HYDROCHLORIDE 5 MG: 5 TABLET ORAL at 22:43

## 2019-01-01 RX ADMIN — MORPHINE SULFATE 60 MG: 60 TABLET, EXTENDED RELEASE ORAL at 22:10

## 2019-01-01 RX ADMIN — GABAPENTIN 300 MG: 300 CAPSULE ORAL at 05:04

## 2019-01-01 RX ADMIN — IPRATROPIUM BROMIDE AND ALBUTEROL SULFATE 3 ML: .5; 3 SOLUTION RESPIRATORY (INHALATION) at 16:00

## 2019-01-01 RX ADMIN — MORPHINE SULFATE 5 MG: 10 INJECTION INTRAVENOUS at 10:18

## 2019-01-01 RX ADMIN — GADOBUTROL 10 ML: 604.72 INJECTION INTRAVENOUS at 16:09

## 2019-01-01 RX ADMIN — SENNOSIDES, DOCUSATE SODIUM 2 TABLET: 50; 8.6 TABLET, FILM COATED ORAL at 04:50

## 2019-01-01 RX ADMIN — POTASSIUM CHLORIDE 20 MEQ: 1500 TABLET, EXTENDED RELEASE ORAL at 04:50

## 2019-01-01 RX ADMIN — ENOXAPARIN SODIUM 40 MG: 100 INJECTION SUBCUTANEOUS at 04:50

## 2019-01-01 RX ADMIN — METRONIDAZOLE 500 MG: 500 TABLET, FILM COATED ORAL at 14:47

## 2019-01-01 RX ADMIN — ACETYLCYSTEINE 3 ML: 200 INHALANT RESPIRATORY (INHALATION) at 07:08

## 2019-01-01 RX ADMIN — GABAPENTIN 300 MG: 300 CAPSULE ORAL at 13:10

## 2019-01-01 RX ADMIN — GABAPENTIN 300 MG: 300 CAPSULE ORAL at 04:45

## 2019-01-01 RX ADMIN — HEPARIN SODIUM 5000 UNITS: 5000 INJECTION INTRAVENOUS; SUBCUTANEOUS at 06:34

## 2019-01-01 RX ADMIN — PIPERACILLIN SODIUM AND TAZOBACTAM SODIUM 3.38 G: 3; .375 INJECTION, POWDER, FOR SOLUTION INTRAVENOUS at 08:06

## 2019-01-01 RX ADMIN — ACETYLCYSTEINE 3 ML: 200 INHALANT RESPIRATORY (INHALATION) at 18:31

## 2019-01-01 RX ADMIN — GABAPENTIN 600 MG: 300 CAPSULE ORAL at 06:54

## 2019-01-01 RX ADMIN — OXYCODONE HYDROCHLORIDE 10 MG: 10 TABLET ORAL at 16:06

## 2019-01-01 RX ADMIN — HEPARIN SODIUM 5000 UNITS: 5000 INJECTION INTRAVENOUS; SUBCUTANEOUS at 18:42

## 2019-01-01 RX ADMIN — CEFEPIME 2 G: 2 INJECTION, POWDER, FOR SOLUTION INTRAVENOUS at 05:22

## 2019-01-01 RX ADMIN — SENNOSIDES, DOCUSATE SODIUM 2 TABLET: 50; 8.6 TABLET, FILM COATED ORAL at 04:58

## 2019-01-01 RX ADMIN — GABAPENTIN 300 MG: 300 CAPSULE ORAL at 13:01

## 2019-01-01 RX ADMIN — IPRATROPIUM BROMIDE AND ALBUTEROL SULFATE 3 ML: .5; 3 SOLUTION RESPIRATORY (INHALATION) at 20:48

## 2019-01-01 RX ADMIN — MORPHINE SULFATE 60 MG: 30 TABLET, FILM COATED, EXTENDED RELEASE ORAL at 09:37

## 2019-01-01 RX ADMIN — MORPHINE SULFATE 10 MG: 10 INJECTION INTRAVENOUS at 02:40

## 2019-01-01 RX ADMIN — MORPHINE SULFATE 60 MG: 60 TABLET, EXTENDED RELEASE ORAL at 21:49

## 2019-01-01 RX ADMIN — OXYCODONE HYDROCHLORIDE 10 MG: 10 TABLET ORAL at 21:50

## 2019-01-01 RX ADMIN — OXYCODONE HYDROCHLORIDE 10 MG: 10 TABLET ORAL at 19:07

## 2019-01-01 RX ADMIN — MORPHINE SULFATE 60 MG: 30 TABLET, FILM COATED, EXTENDED RELEASE ORAL at 20:56

## 2019-01-01 RX ADMIN — IPRATROPIUM BROMIDE AND ALBUTEROL SULFATE 3 ML: .5; 3 SOLUTION RESPIRATORY (INHALATION) at 16:12

## 2019-01-01 RX ADMIN — MORPHINE SULFATE 60 MG: 30 TABLET, FILM COATED, EXTENDED RELEASE ORAL at 20:01

## 2019-01-01 RX ADMIN — OXYCODONE HYDROCHLORIDE 10 MG: 10 TABLET ORAL at 17:16

## 2019-01-01 RX ADMIN — ACETYLCYSTEINE 3 ML: 200 INHALANT RESPIRATORY (INHALATION) at 07:19

## 2019-01-01 RX ADMIN — OXYCODONE HYDROCHLORIDE 5 MG: 5 TABLET ORAL at 18:36

## 2019-01-01 RX ADMIN — MORPHINE SULFATE 60 MG: 30 TABLET, FILM COATED, EXTENDED RELEASE ORAL at 20:36

## 2019-01-01 RX ADMIN — GUAIFENESIN 1200 MG: 600 TABLET, EXTENDED RELEASE ORAL at 05:25

## 2019-01-01 RX ADMIN — METRONIDAZOLE 500 MG: 500 TABLET, FILM COATED ORAL at 05:04

## 2019-01-01 RX ADMIN — HEPARIN SODIUM 5000 UNITS: 5000 INJECTION INTRAVENOUS; SUBCUTANEOUS at 15:10

## 2019-01-01 RX ADMIN — GABAPENTIN 600 MG: 300 CAPSULE ORAL at 12:02

## 2019-01-01 RX ADMIN — MORPHINE SULFATE 60 MG: 60 TABLET, EXTENDED RELEASE ORAL at 09:09

## 2019-01-01 RX ADMIN — ACETYLCYSTEINE 3 ML: 200 INHALANT RESPIRATORY (INHALATION) at 02:19

## 2019-01-01 RX ADMIN — MORPHINE SULFATE 60 MG: 60 TABLET, EXTENDED RELEASE ORAL at 10:31

## 2019-01-01 RX ADMIN — FUROSEMIDE 40 MG: 10 INJECTION, SOLUTION INTRAMUSCULAR; INTRAVENOUS at 04:50

## 2019-01-01 RX ADMIN — ACETYLCYSTEINE 3 ML: 200 INHALANT RESPIRATORY (INHALATION) at 15:04

## 2019-01-01 RX ADMIN — SENNOSIDES, DOCUSATE SODIUM 2 TABLET: 50; 8.6 TABLET, FILM COATED ORAL at 05:25

## 2019-01-01 RX ADMIN — MORPHINE SULFATE 5 MG: 10 INJECTION INTRAVENOUS at 12:57

## 2019-01-01 RX ADMIN — OXYCODONE HYDROCHLORIDE 5 MG: 5 TABLET ORAL at 17:18

## 2019-01-01 RX ADMIN — GABAPENTIN 600 MG: 300 CAPSULE ORAL at 05:09

## 2019-01-01 RX ADMIN — OXYCODONE HYDROCHLORIDE 5 MG: 5 TABLET ORAL at 08:06

## 2019-01-01 RX ADMIN — OXYCODONE HYDROCHLORIDE 10 MG: 10 TABLET ORAL at 04:58

## 2019-01-01 RX ADMIN — POTASSIUM CHLORIDE 40 MEQ: 1500 TABLET, EXTENDED RELEASE ORAL at 17:54

## 2019-01-01 RX ADMIN — OXYCODONE HYDROCHLORIDE 10 MG: 10 TABLET ORAL at 17:29

## 2019-01-01 RX ADMIN — ALBUTEROL SULFATE 2 PUFF: 90 AEROSOL, METERED RESPIRATORY (INHALATION) at 21:14

## 2019-01-01 RX ADMIN — GABAPENTIN 600 MG: 300 CAPSULE ORAL at 17:16

## 2019-01-01 RX ADMIN — OXYCODONE HYDROCHLORIDE 10 MG: 10 TABLET ORAL at 05:04

## 2019-01-01 RX ADMIN — IPRATROPIUM BROMIDE AND ALBUTEROL SULFATE 3 ML: .5; 3 SOLUTION RESPIRATORY (INHALATION) at 15:00

## 2019-01-01 RX ADMIN — CEFEPIME 2 G: 2 INJECTION, POWDER, FOR SOLUTION INTRAVENOUS at 04:57

## 2019-01-01 RX ADMIN — PIPERACILLIN SODIUM AND TAZOBACTAM SODIUM 3.38 G: 3; .375 INJECTION, POWDER, FOR SOLUTION INTRAVENOUS at 08:03

## 2019-01-01 RX ADMIN — IOHEXOL 50 ML: 350 INJECTION, SOLUTION INTRAVENOUS at 16:14

## 2019-01-01 RX ADMIN — ALBUTEROL SULFATE 2 PUFF: 90 AEROSOL, METERED RESPIRATORY (INHALATION) at 05:45

## 2019-01-01 RX ADMIN — IPRATROPIUM BROMIDE AND ALBUTEROL SULFATE 3 ML: .5; 3 SOLUTION RESPIRATORY (INHALATION) at 06:45

## 2019-01-01 RX ADMIN — IPRATROPIUM BROMIDE AND ALBUTEROL SULFATE 3 ML: .5; 3 SOLUTION RESPIRATORY (INHALATION) at 18:44

## 2019-01-01 RX ADMIN — GUAIFENESIN 1200 MG: 600 TABLET, EXTENDED RELEASE ORAL at 05:41

## 2019-01-01 RX ADMIN — IPRATROPIUM BROMIDE AND ALBUTEROL SULFATE 3 ML: .5; 3 SOLUTION RESPIRATORY (INHALATION) at 19:51

## 2019-01-01 RX ADMIN — HEPARIN SODIUM 5000 UNITS: 5000 INJECTION INTRAVENOUS; SUBCUTANEOUS at 05:23

## 2019-01-01 RX ADMIN — GABAPENTIN 300 MG: 300 CAPSULE ORAL at 00:19

## 2019-01-01 RX ADMIN — PIPERACILLIN SODIUM AND TAZOBACTAM SODIUM 3.38 G: 3; .375 INJECTION, POWDER, FOR SOLUTION INTRAVENOUS at 00:49

## 2019-01-01 RX ADMIN — IPRATROPIUM BROMIDE AND ALBUTEROL SULFATE 3 ML: .5; 3 SOLUTION RESPIRATORY (INHALATION) at 08:48

## 2019-01-01 RX ADMIN — AMOXICILLIN AND CLAVULANATE POTASSIUM 1 TABLET: 875; 125 TABLET, FILM COATED ORAL at 05:08

## 2019-01-01 RX ADMIN — OXYCODONE HYDROCHLORIDE 10 MG: 10 TABLET ORAL at 11:45

## 2019-01-01 RX ADMIN — IPRATROPIUM BROMIDE AND ALBUTEROL SULFATE 3 ML: .5; 3 SOLUTION RESPIRATORY (INHALATION) at 07:19

## 2019-01-01 RX ADMIN — HEPARIN SODIUM 5000 UNITS: 5000 INJECTION INTRAVENOUS; SUBCUTANEOUS at 22:09

## 2019-01-01 RX ADMIN — ACETYLCYSTEINE 3 ML: 200 INHALANT RESPIRATORY (INHALATION) at 14:15

## 2019-01-01 RX ADMIN — GABAPENTIN 300 MG: 300 CAPSULE ORAL at 17:07

## 2019-01-01 RX ADMIN — IPRATROPIUM BROMIDE AND ALBUTEROL SULFATE 3 ML: .5; 3 SOLUTION RESPIRATORY (INHALATION) at 11:09

## 2019-01-01 RX ADMIN — IOHEXOL 25 ML: 240 INJECTION, SOLUTION INTRATHECAL; INTRAVASCULAR; INTRAVENOUS; ORAL at 11:30

## 2019-01-01 RX ADMIN — ACETYLCYSTEINE 3 ML: 200 INHALANT RESPIRATORY (INHALATION) at 18:44

## 2019-01-01 RX ADMIN — GABAPENTIN 300 MG: 300 CAPSULE ORAL at 05:25

## 2019-01-01 RX ADMIN — SODIUM CHLORIDE, POTASSIUM CHLORIDE, SODIUM LACTATE AND CALCIUM CHLORIDE: 600; 310; 30; 20 INJECTION, SOLUTION INTRAVENOUS at 02:34

## 2019-01-01 RX ADMIN — POTASSIUM CHLORIDE 40 MEQ: 1500 TABLET, EXTENDED RELEASE ORAL at 17:14

## 2019-01-01 RX ADMIN — IOHEXOL 75 ML: 350 INJECTION, SOLUTION INTRAVENOUS at 11:48

## 2019-01-01 RX ADMIN — CEFEPIME 2 G: 2 INJECTION, POWDER, FOR SOLUTION INTRAVENOUS at 05:05

## 2019-01-01 RX ADMIN — POLYETHYLENE GLYCOL 3350 1 PACKET: 17 POWDER, FOR SOLUTION ORAL at 08:57

## 2019-01-01 RX ADMIN — GABAPENTIN 600 MG: 300 CAPSULE ORAL at 11:19

## 2019-01-01 RX ADMIN — OXYCODONE HYDROCHLORIDE 5 MG: 5 TABLET ORAL at 14:11

## 2019-01-01 RX ADMIN — IPRATROPIUM BROMIDE AND ALBUTEROL SULFATE 3 ML: .5; 3 SOLUTION RESPIRATORY (INHALATION) at 07:43

## 2019-01-01 RX ADMIN — METRONIDAZOLE 500 MG: 500 INJECTION, SOLUTION INTRAVENOUS at 21:49

## 2019-01-01 RX ADMIN — MORPHINE SULFATE 5 MG: 10 INJECTION INTRAVENOUS at 04:26

## 2019-01-01 RX ADMIN — LORAZEPAM 1 MG: 2 INJECTION INTRAMUSCULAR; INTRAVENOUS at 22:43

## 2019-01-01 RX ADMIN — OXYCODONE HYDROCHLORIDE 10 MG: 10 TABLET ORAL at 08:57

## 2019-01-01 RX ADMIN — PIPERACILLIN SODIUM AND TAZOBACTAM SODIUM 3.38 G: 3; .375 INJECTION, POWDER, FOR SOLUTION INTRAVENOUS at 00:59

## 2019-01-01 RX ADMIN — IPRATROPIUM BROMIDE AND ALBUTEROL SULFATE 3 ML: .5; 3 SOLUTION RESPIRATORY (INHALATION) at 07:25

## 2019-01-01 RX ADMIN — OXYCODONE HYDROCHLORIDE 5 MG: 5 TABLET ORAL at 22:57

## 2019-01-01 RX ADMIN — PIPERACILLIN SODIUM AND TAZOBACTAM SODIUM 3.38 G: 3; .375 INJECTION, POWDER, FOR SOLUTION INTRAVENOUS at 08:51

## 2019-01-01 RX ADMIN — MORPHINE SULFATE 5 MG: 10 INJECTION INTRAVENOUS at 16:14

## 2019-01-01 RX ADMIN — HEPARIN SODIUM 5000 UNITS: 5000 INJECTION INTRAVENOUS; SUBCUTANEOUS at 05:04

## 2019-01-01 RX ADMIN — MORPHINE SULFATE 60 MG: 30 TABLET, FILM COATED, EXTENDED RELEASE ORAL at 21:04

## 2019-01-01 RX ADMIN — ACETYLCYSTEINE 3 ML: 200 INHALANT RESPIRATORY (INHALATION) at 10:54

## 2019-01-01 RX ADMIN — ACETYLCYSTEINE 3 ML: 200 INHALANT RESPIRATORY (INHALATION) at 11:12

## 2019-01-01 RX ADMIN — MORPHINE SULFATE 60 MG: 30 TABLET, FILM COATED, EXTENDED RELEASE ORAL at 08:03

## 2019-01-01 RX ADMIN — ACETYLCYSTEINE 3 ML: 200 INHALANT RESPIRATORY (INHALATION) at 10:00

## 2019-01-01 RX ADMIN — HEPARIN SODIUM 5000 UNITS: 5000 INJECTION INTRAVENOUS; SUBCUTANEOUS at 04:59

## 2019-01-01 RX ADMIN — GABAPENTIN 300 MG: 300 CAPSULE ORAL at 05:29

## 2019-01-01 RX ADMIN — MORPHINE SULFATE 60 MG: 30 TABLET, FILM COATED, EXTENDED RELEASE ORAL at 09:31

## 2019-01-01 RX ADMIN — ACETYLCYSTEINE 3 ML: 200 INHALANT RESPIRATORY (INHALATION) at 13:56

## 2019-01-01 RX ADMIN — OXYCODONE HYDROCHLORIDE 10 MG: 10 TABLET ORAL at 09:09

## 2019-01-01 RX ADMIN — SENNOSIDES, DOCUSATE SODIUM 2 TABLET: 50; 8.6 TABLET, FILM COATED ORAL at 18:14

## 2019-01-01 RX ADMIN — GABAPENTIN 300 MG: 300 CAPSULE ORAL at 09:09

## 2019-01-01 RX ADMIN — AMPICILLIN AND SULBACTAM 3 G: 2; 1 INJECTION, POWDER, FOR SOLUTION INTRAVENOUS at 04:37

## 2019-01-01 RX ADMIN — LORAZEPAM 1 MG: 2 INJECTION INTRAMUSCULAR; INTRAVENOUS at 03:59

## 2019-01-01 RX ADMIN — GABAPENTIN 300 MG: 300 CAPSULE ORAL at 16:39

## 2019-01-01 RX ADMIN — SODIUM CHLORIDE 1000 ML: 9 INJECTION, SOLUTION INTRAVENOUS at 07:05

## 2019-01-01 RX ADMIN — MORPHINE SULFATE 60 MG: 60 TABLET, EXTENDED RELEASE ORAL at 09:57

## 2019-01-01 RX ADMIN — FENTANYL CITRATE 50 MCG: 50 INJECTION, SOLUTION INTRAMUSCULAR; INTRAVENOUS at 08:19

## 2019-01-01 RX ADMIN — ALBUTEROL SULFATE 2 PUFF: 90 AEROSOL, METERED RESPIRATORY (INHALATION) at 01:01

## 2019-01-01 RX ADMIN — IPRATROPIUM BROMIDE AND ALBUTEROL SULFATE 3 ML: .5; 3 SOLUTION RESPIRATORY (INHALATION) at 22:39

## 2019-01-01 RX ADMIN — SENNOSIDES, DOCUSATE SODIUM 2 TABLET: 50; 8.6 TABLET, FILM COATED ORAL at 04:26

## 2019-01-01 RX ADMIN — METRONIDAZOLE 500 MG: 500 INJECTION, SOLUTION INTRAVENOUS at 06:28

## 2019-01-01 RX ADMIN — ACETYLCYSTEINE 3 ML: 200 INHALANT RESPIRATORY (INHALATION) at 06:45

## 2019-01-01 RX ADMIN — GABAPENTIN 600 MG: 300 CAPSULE ORAL at 06:33

## 2019-01-01 RX ADMIN — IPRATROPIUM BROMIDE AND ALBUTEROL SULFATE 3 ML: .5; 3 SOLUTION RESPIRATORY (INHALATION) at 13:56

## 2019-01-01 RX ADMIN — IPRATROPIUM BROMIDE AND ALBUTEROL SULFATE 3 ML: .5; 3 SOLUTION RESPIRATORY (INHALATION) at 19:33

## 2019-01-01 RX ADMIN — MORPHINE SULFATE 5 MG: 10 INJECTION INTRAVENOUS at 16:21

## 2019-01-01 RX ADMIN — OXYCODONE HYDROCHLORIDE 5 MG: 5 TABLET ORAL at 12:22

## 2019-01-01 RX ADMIN — MORPHINE SULFATE 60 MG: 30 TABLET, FILM COATED, EXTENDED RELEASE ORAL at 19:59

## 2019-01-01 RX ADMIN — PIPERACILLIN SODIUM AND TAZOBACTAM SODIUM 3.38 G: 3; .375 INJECTION, POWDER, FOR SOLUTION INTRAVENOUS at 00:12

## 2019-01-01 RX ADMIN — OXYCODONE HYDROCHLORIDE 10 MG: 10 TABLET ORAL at 15:52

## 2019-01-01 RX ADMIN — CEFEPIME 2 G: 2 INJECTION, POWDER, FOR SOLUTION INTRAVENOUS at 22:09

## 2019-01-01 RX ADMIN — PIPERACILLIN SODIUM AND TAZOBACTAM SODIUM 3.38 G: 3; .375 INJECTION, POWDER, FOR SOLUTION INTRAVENOUS at 08:41

## 2019-01-01 RX ADMIN — SENNOSIDES, DOCUSATE SODIUM 2 TABLET: 50; 8.6 TABLET, FILM COATED ORAL at 05:41

## 2019-01-01 RX ADMIN — FUROSEMIDE 20 MG: 20 TABLET ORAL at 05:25

## 2019-01-01 RX ADMIN — FUROSEMIDE 20 MG: 20 TABLET ORAL at 04:56

## 2019-01-01 RX ADMIN — MORPHINE SULFATE 60 MG: 30 TABLET, FILM COATED, EXTENDED RELEASE ORAL at 22:11

## 2019-01-01 RX ADMIN — MORPHINE SULFATE 5 MG: 10 INJECTION INTRAVENOUS at 14:30

## 2019-01-01 RX ADMIN — MIDAZOLAM 2 MG: 1 INJECTION INTRAMUSCULAR; INTRAVENOUS at 08:24

## 2019-01-01 RX ADMIN — OXYCODONE HYDROCHLORIDE 10 MG: 10 TABLET ORAL at 21:30

## 2019-01-01 RX ADMIN — LORAZEPAM 1 MG: 2 INJECTION INTRAMUSCULAR; INTRAVENOUS at 12:25

## 2019-01-01 RX ADMIN — CEFEPIME 2 G: 2 INJECTION, POWDER, FOR SOLUTION INTRAVENOUS at 06:55

## 2019-01-01 RX ADMIN — SENNOSIDES, DOCUSATE SODIUM 2 TABLET: 50; 8.6 TABLET, FILM COATED ORAL at 06:33

## 2019-01-01 RX ADMIN — OXYCODONE HYDROCHLORIDE 10 MG: 10 TABLET ORAL at 06:55

## 2019-01-01 RX ADMIN — OXYCODONE HYDROCHLORIDE 5 MG: 5 TABLET ORAL at 04:32

## 2019-01-01 RX ADMIN — OXYCODONE HYDROCHLORIDE 5 MG: 5 TABLET ORAL at 04:50

## 2019-01-01 RX ADMIN — LORAZEPAM 1 MG: 2 INJECTION INTRAMUSCULAR; INTRAVENOUS at 20:02

## 2019-01-01 RX ADMIN — ACETYLCYSTEINE 3 ML: 200 INHALANT RESPIRATORY (INHALATION) at 22:04

## 2019-01-01 RX ADMIN — OXYCODONE HYDROCHLORIDE 5 MG: 5 TABLET ORAL at 11:06

## 2019-01-01 RX ADMIN — ACETYLCYSTEINE 3 ML: 200 INHALANT RESPIRATORY (INHALATION) at 14:20

## 2019-01-01 RX ADMIN — FENTANYL CITRATE 50 MCG: 50 INJECTION, SOLUTION INTRAMUSCULAR; INTRAVENOUS at 08:24

## 2019-01-01 RX ADMIN — MORPHINE SULFATE 60 MG: 60 TABLET, EXTENDED RELEASE ORAL at 21:19

## 2019-01-01 RX ADMIN — GABAPENTIN 600 MG: 300 CAPSULE ORAL at 13:41

## 2019-01-01 RX ADMIN — IPRATROPIUM BROMIDE AND ALBUTEROL SULFATE 3 ML: .5; 3 SOLUTION RESPIRATORY (INHALATION) at 11:00

## 2019-01-01 RX ADMIN — HEPARIN SODIUM 5000 UNITS: 5000 INJECTION INTRAVENOUS; SUBCUTANEOUS at 21:31

## 2019-01-01 RX ADMIN — MORPHINE SULFATE 15 MG: 15 TABLET, EXTENDED RELEASE ORAL at 14:05

## 2019-01-01 RX ADMIN — MORPHINE SULFATE 10 MG: 10 INJECTION INTRAVENOUS at 18:08

## 2019-01-01 RX ADMIN — LORAZEPAM 1 MG: 2 INJECTION INTRAMUSCULAR; INTRAVENOUS at 04:35

## 2019-01-01 RX ADMIN — OXYCODONE HYDROCHLORIDE 10 MG: 10 TABLET ORAL at 06:37

## 2019-01-01 RX ADMIN — HEPARIN SODIUM 5000 UNITS: 5000 INJECTION INTRAVENOUS; SUBCUTANEOUS at 14:03

## 2019-01-01 RX ADMIN — MORPHINE SULFATE 2 MG: 4 INJECTION INTRAVENOUS at 00:25

## 2019-01-01 RX ADMIN — GABAPENTIN 300 MG: 300 CAPSULE ORAL at 15:53

## 2019-01-01 RX ADMIN — OXYCODONE HYDROCHLORIDE 10 MG: 10 TABLET ORAL at 14:48

## 2019-01-01 RX ADMIN — MORPHINE SULFATE 60 MG: 60 TABLET, EXTENDED RELEASE ORAL at 10:16

## 2019-01-01 RX ADMIN — OXYCODONE HYDROCHLORIDE 10 MG: 10 TABLET ORAL at 05:24

## 2019-01-01 RX ADMIN — MORPHINE SULFATE 60 MG: 30 TABLET, FILM COATED, EXTENDED RELEASE ORAL at 08:12

## 2019-01-01 RX ADMIN — SENNOSIDES, DOCUSATE SODIUM 2 TABLET: 50; 8.6 TABLET, FILM COATED ORAL at 06:54

## 2019-01-01 RX ADMIN — IPRATROPIUM BROMIDE AND ALBUTEROL SULFATE 3 ML: .5; 3 SOLUTION RESPIRATORY (INHALATION) at 10:00

## 2019-01-01 RX ADMIN — OXYCODONE HYDROCHLORIDE 5 MG: 5 TABLET ORAL at 02:38

## 2019-01-01 RX ADMIN — OXYCODONE HYDROCHLORIDE 10 MG: 10 TABLET ORAL at 01:05

## 2019-01-01 RX ADMIN — SENNOSIDES, DOCUSATE SODIUM 2 TABLET: 50; 8.6 TABLET, FILM COATED ORAL at 05:09

## 2019-01-01 RX ADMIN — OXYCODONE HYDROCHLORIDE 10 MG: 10 TABLET ORAL at 13:41

## 2019-01-01 RX ADMIN — MORPHINE SULFATE 60 MG: 60 TABLET, EXTENDED RELEASE ORAL at 21:32

## 2019-01-01 RX ADMIN — LORAZEPAM 1 MG: 2 INJECTION INTRAMUSCULAR; INTRAVENOUS at 18:08

## 2019-01-01 RX ADMIN — AMOXICILLIN AND CLAVULANATE POTASSIUM 1 TABLET: 875; 125 TABLET, FILM COATED ORAL at 17:36

## 2019-01-01 RX ADMIN — OXYCODONE HYDROCHLORIDE 10 MG: 10 TABLET ORAL at 18:14

## 2019-01-01 RX ADMIN — IPRATROPIUM BROMIDE AND ALBUTEROL SULFATE 3 ML: .5; 3 SOLUTION RESPIRATORY (INHALATION) at 10:03

## 2019-01-01 RX ADMIN — METRONIDAZOLE 500 MG: 500 INJECTION, SOLUTION INTRAVENOUS at 15:09

## 2019-01-01 RX ADMIN — DRONABINOL 5 MG: 5 CAPSULE ORAL at 10:50

## 2019-01-01 RX ADMIN — FUROSEMIDE 20 MG: 20 TABLET ORAL at 05:41

## 2019-01-01 RX ADMIN — CEFEPIME 2 G: 2 INJECTION, POWDER, FOR SOLUTION INTRAVENOUS at 15:10

## 2019-01-01 RX ADMIN — MORPHINE SULFATE 2 MG: 4 INJECTION INTRAVENOUS at 05:29

## 2019-01-01 RX ADMIN — MORPHINE SULFATE 60 MG: 30 TABLET, FILM COATED, EXTENDED RELEASE ORAL at 08:51

## 2019-01-01 RX ADMIN — IPRATROPIUM BROMIDE AND ALBUTEROL SULFATE 3 ML: .5; 3 SOLUTION RESPIRATORY (INHALATION) at 21:58

## 2019-01-01 RX ADMIN — GUAIFENESIN AND DEXTROMETHORPHAN 10 ML: 100; 10 SYRUP ORAL at 17:30

## 2019-01-01 RX ADMIN — GABAPENTIN 300 MG: 300 CAPSULE ORAL at 12:30

## 2019-01-01 RX ADMIN — GABAPENTIN 300 MG: 300 CAPSULE ORAL at 17:28

## 2019-01-01 RX ADMIN — SENNOSIDES, DOCUSATE SODIUM 2 TABLET: 50; 8.6 TABLET, FILM COATED ORAL at 17:17

## 2019-01-01 RX ADMIN — IPRATROPIUM BROMIDE AND ALBUTEROL SULFATE 3 ML: .5; 3 SOLUTION RESPIRATORY (INHALATION) at 02:18

## 2019-01-01 RX ADMIN — POTASSIUM CHLORIDE 40 MEQ: 1500 TABLET, EXTENDED RELEASE ORAL at 04:56

## 2019-01-01 RX ADMIN — GABAPENTIN 300 MG: 300 CAPSULE ORAL at 17:15

## 2019-01-01 RX ADMIN — OXYCODONE HYDROCHLORIDE 10 MG: 10 TABLET ORAL at 08:07

## 2019-01-01 RX ADMIN — ACETYLCYSTEINE 3 ML: 200 INHALANT RESPIRATORY (INHALATION) at 15:02

## 2019-01-01 RX ADMIN — IPRATROPIUM BROMIDE AND ALBUTEROL SULFATE 3 ML: .5; 3 SOLUTION RESPIRATORY (INHALATION) at 02:33

## 2019-01-01 RX ADMIN — IPRATROPIUM BROMIDE AND ALBUTEROL SULFATE 3 ML: .5; 3 SOLUTION RESPIRATORY (INHALATION) at 11:12

## 2019-01-01 RX ADMIN — OXYCODONE HYDROCHLORIDE 10 MG: 10 TABLET ORAL at 13:43

## 2019-01-01 RX ADMIN — MORPHINE SULFATE 5 MG: 10 INJECTION INTRAVENOUS at 09:53

## 2019-01-01 RX ADMIN — POTASSIUM CHLORIDE 40 MEQ: 1500 TABLET, EXTENDED RELEASE ORAL at 05:41

## 2019-01-01 RX ADMIN — ACETYLCYSTEINE 3 ML: 200 INHALANT RESPIRATORY (INHALATION) at 18:46

## 2019-01-01 RX ADMIN — SODIUM CHLORIDE 1000 ML: 9 INJECTION, SOLUTION INTRAVENOUS at 22:17

## 2019-01-01 RX ADMIN — ACETYLCYSTEINE 3 ML: 200 INHALANT RESPIRATORY (INHALATION) at 21:59

## 2019-01-01 RX ADMIN — MORPHINE SULFATE 60 MG: 30 TABLET, FILM COATED, EXTENDED RELEASE ORAL at 08:41

## 2019-01-01 RX ADMIN — GABAPENTIN 600 MG: 300 CAPSULE ORAL at 17:28

## 2019-01-01 RX ADMIN — CEFEPIME 2 G: 2 INJECTION, POWDER, FOR SOLUTION INTRAVENOUS at 21:31

## 2019-01-01 RX ADMIN — HEPARIN SODIUM 5000 UNITS: 5000 INJECTION INTRAVENOUS; SUBCUTANEOUS at 21:48

## 2019-01-01 RX ADMIN — OXYCODONE HYDROCHLORIDE 10 MG: 10 TABLET ORAL at 21:19

## 2019-01-01 RX ADMIN — OXYCODONE HYDROCHLORIDE 10 MG: 10 TABLET ORAL at 09:57

## 2019-01-01 RX ADMIN — ALBUTEROL SULFATE 2 PUFF: 90 AEROSOL, METERED RESPIRATORY (INHALATION) at 00:14

## 2019-01-01 RX ADMIN — HEPARIN SODIUM 5000 UNITS: 5000 INJECTION INTRAVENOUS; SUBCUTANEOUS at 13:42

## 2019-01-01 RX ADMIN — CEFEPIME 2 G: 2 INJECTION, POWDER, FOR SOLUTION INTRAVENOUS at 13:43

## 2019-01-01 RX ADMIN — GABAPENTIN 300 MG: 300 CAPSULE ORAL at 04:50

## 2019-01-01 RX ADMIN — HEPARIN SODIUM 5000 UNITS: 5000 INJECTION INTRAVENOUS; SUBCUTANEOUS at 14:48

## 2019-01-01 RX ADMIN — METRONIDAZOLE 500 MG: 500 TABLET, FILM COATED ORAL at 22:09

## 2019-01-01 RX ADMIN — PIPERACILLIN AND TAZOBACTAM 3.38 G: 3; .375 INJECTION, POWDER, LYOPHILIZED, FOR SOLUTION INTRAVENOUS; PARENTERAL at 12:29

## 2019-01-01 RX ADMIN — GABAPENTIN 300 MG: 300 CAPSULE ORAL at 17:18

## 2019-01-01 RX ADMIN — GUAIFENESIN 1200 MG: 600 TABLET, EXTENDED RELEASE ORAL at 17:14

## 2019-01-01 RX ADMIN — IPRATROPIUM BROMIDE AND ALBUTEROL SULFATE 3 ML: .5; 3 SOLUTION RESPIRATORY (INHALATION) at 18:31

## 2019-01-01 RX ADMIN — IPRATROPIUM BROMIDE AND ALBUTEROL SULFATE 3 ML: .5; 3 SOLUTION RESPIRATORY (INHALATION) at 07:24

## 2019-01-01 RX ADMIN — GUAIFENESIN 1200 MG: 600 TABLET, EXTENDED RELEASE ORAL at 17:18

## 2019-01-01 RX ADMIN — OXYCODONE HYDROCHLORIDE 5 MG: 5 TABLET ORAL at 22:58

## 2019-01-01 RX ADMIN — IPRATROPIUM BROMIDE AND ALBUTEROL SULFATE 3 ML: .5; 3 SOLUTION RESPIRATORY (INHALATION) at 10:54

## 2019-01-01 RX ADMIN — SENNOSIDES, DOCUSATE SODIUM 2 TABLET: 50; 8.6 TABLET, FILM COATED ORAL at 04:56

## 2019-01-01 RX ADMIN — DRONABINOL 5 MG: 5 CAPSULE ORAL at 11:19

## 2019-01-01 RX ADMIN — OXYCODONE HYDROCHLORIDE 10 MG: 10 TABLET ORAL at 19:59

## 2019-01-01 RX ADMIN — PIPERACILLIN SODIUM AND TAZOBACTAM SODIUM 3.38 G: 3; .375 INJECTION, POWDER, FOR SOLUTION INTRAVENOUS at 15:41

## 2019-01-01 RX ADMIN — ACETYLCYSTEINE 3 ML: 200 INHALANT RESPIRATORY (INHALATION) at 18:49

## 2019-01-01 RX ADMIN — SENNOSIDES, DOCUSATE SODIUM 2 TABLET: 50; 8.6 TABLET, FILM COATED ORAL at 17:29

## 2019-01-01 RX ADMIN — IPRATROPIUM BROMIDE AND ALBUTEROL SULFATE 3 ML: .5; 3 SOLUTION RESPIRATORY (INHALATION) at 14:15

## 2019-01-01 RX ADMIN — METRONIDAZOLE 500 MG: 500 TABLET, FILM COATED ORAL at 04:58

## 2019-01-01 RX ADMIN — GABAPENTIN 300 MG: 300 CAPSULE ORAL at 08:37

## 2019-01-01 RX ADMIN — OXYCODONE HYDROCHLORIDE 5 MG: 5 TABLET ORAL at 13:16

## 2019-01-01 RX ADMIN — GABAPENTIN 300 MG: 300 CAPSULE ORAL at 12:23

## 2019-01-01 RX ADMIN — OXYCODONE HYDROCHLORIDE 5 MG: 5 TABLET ORAL at 16:39

## 2019-01-01 RX ADMIN — GABAPENTIN 600 MG: 300 CAPSULE ORAL at 18:13

## 2019-01-01 RX ADMIN — MORPHINE SULFATE 60 MG: 60 TABLET, EXTENDED RELEASE ORAL at 10:51

## 2019-01-01 RX ADMIN — ALBUTEROL SULFATE 2 PUFF: 90 AEROSOL, METERED RESPIRATORY (INHALATION) at 00:51

## 2019-01-01 RX ADMIN — GABAPENTIN 300 MG: 300 CAPSULE ORAL at 04:56

## 2019-01-01 RX ADMIN — CEFEPIME 2 G: 2 INJECTION, POWDER, FOR SOLUTION INTRAVENOUS at 14:04

## 2019-01-01 RX ADMIN — IPRATROPIUM BROMIDE AND ALBUTEROL SULFATE 3 ML: .5; 3 SOLUTION RESPIRATORY (INHALATION) at 15:01

## 2019-01-01 RX ADMIN — OXYCODONE HYDROCHLORIDE 10 MG: 10 TABLET ORAL at 03:57

## 2019-01-01 RX ADMIN — HEPARIN SODIUM 5000 UNITS: 5000 INJECTION INTRAVENOUS; SUBCUTANEOUS at 21:19

## 2019-01-01 RX ADMIN — CEFEPIME 2 G: 2 INJECTION, POWDER, FOR SOLUTION INTRAVENOUS at 14:48

## 2019-01-01 RX ADMIN — METRONIDAZOLE 500 MG: 500 TABLET, FILM COATED ORAL at 15:10

## 2019-01-01 RX ADMIN — OXYCODONE HYDROCHLORIDE 5 MG: 5 TABLET ORAL at 23:26

## 2019-01-01 RX ADMIN — ACETYLCYSTEINE 3 ML: 200 INHALANT RESPIRATORY (INHALATION) at 07:24

## 2019-01-01 RX ADMIN — PIPERACILLIN SODIUM AND TAZOBACTAM SODIUM 3.38 G: 3; .375 INJECTION, POWDER, FOR SOLUTION INTRAVENOUS at 17:17

## 2019-01-01 RX ADMIN — IPRATROPIUM BROMIDE AND ALBUTEROL SULFATE 3 ML: .5; 3 SOLUTION RESPIRATORY (INHALATION) at 21:07

## 2019-01-01 RX ADMIN — LORAZEPAM 1 MG: 2 INJECTION INTRAMUSCULAR; INTRAVENOUS at 08:37

## 2019-01-01 RX ADMIN — MIDAZOLAM HYDROCHLORIDE 2 MG: 1 INJECTION, SOLUTION INTRAMUSCULAR; INTRAVENOUS at 08:18

## 2019-01-01 RX ADMIN — MORPHINE SULFATE 60 MG: 60 TABLET, EXTENDED RELEASE ORAL at 21:31

## 2019-01-01 RX ADMIN — ENOXAPARIN SODIUM 40 MG: 100 INJECTION SUBCUTANEOUS at 05:25

## 2019-01-01 RX ADMIN — GABAPENTIN 300 MG: 300 CAPSULE ORAL at 05:41

## 2019-01-01 RX ADMIN — SENNOSIDES, DOCUSATE SODIUM 2 TABLET: 50; 8.6 TABLET, FILM COATED ORAL at 17:31

## 2019-01-01 RX ADMIN — IOHEXOL 60 ML: 350 INJECTION, SOLUTION INTRAVENOUS at 21:15

## 2019-01-01 RX ADMIN — POTASSIUM CHLORIDE 40 MEQ: 1500 TABLET, EXTENDED RELEASE ORAL at 05:25

## 2019-01-01 RX ADMIN — SENNOSIDES, DOCUSATE SODIUM 2 TABLET: 50; 8.6 TABLET, FILM COATED ORAL at 05:04

## 2019-01-01 RX ADMIN — HEPARIN SODIUM 5000 UNITS: 5000 INJECTION INTRAVENOUS; SUBCUTANEOUS at 21:32

## 2019-01-01 RX ADMIN — DRONABINOL 5 MG: 5 CAPSULE ORAL at 16:05

## 2019-01-01 RX ADMIN — ACETYLCYSTEINE 3 ML: 200 INHALANT RESPIRATORY (INHALATION) at 22:40

## 2019-01-01 RX ADMIN — PIPERACILLIN SODIUM AND TAZOBACTAM SODIUM 3.38 G: 3; .375 INJECTION, POWDER, FOR SOLUTION INTRAVENOUS at 00:20

## 2019-01-01 RX ADMIN — ENOXAPARIN SODIUM 40 MG: 100 INJECTION SUBCUTANEOUS at 04:56

## 2019-01-01 RX ADMIN — DOCUSATE SODIUM 100 MG: 100 CAPSULE, LIQUID FILLED ORAL at 04:26

## 2019-01-01 RX ADMIN — MORPHINE SULFATE 60 MG: 30 TABLET, FILM COATED, EXTENDED RELEASE ORAL at 08:21

## 2019-01-01 RX ADMIN — MORPHINE SULFATE 60 MG: 30 TABLET, FILM COATED, EXTENDED RELEASE ORAL at 20:55

## 2019-01-01 RX ADMIN — IPRATROPIUM BROMIDE AND ALBUTEROL SULFATE 3 ML: .5; 3 SOLUTION RESPIRATORY (INHALATION) at 22:04

## 2019-01-01 RX ADMIN — CEFEPIME 2 G: 2 INJECTION, POWDER, FOR SOLUTION INTRAVENOUS at 14:32

## 2019-01-01 RX ADMIN — IPRATROPIUM BROMIDE AND ALBUTEROL SULFATE 3 ML: .5; 3 SOLUTION RESPIRATORY (INHALATION) at 14:21

## 2019-01-01 RX ADMIN — MIDAZOLAM 2 MG: 1 INJECTION INTRAMUSCULAR; INTRAVENOUS at 08:18

## 2019-01-01 RX ADMIN — SENNOSIDES, DOCUSATE SODIUM 2 TABLET: 50; 8.6 TABLET, FILM COATED ORAL at 17:15

## 2019-01-01 RX ADMIN — DRONABINOL 5 MG: 5 CAPSULE ORAL at 17:16

## 2019-01-01 RX ADMIN — PIPERACILLIN SODIUM AND TAZOBACTAM SODIUM 3.38 G: 3; .375 INJECTION, POWDER, FOR SOLUTION INTRAVENOUS at 17:07

## 2019-01-01 RX ADMIN — ACETYLCYSTEINE 3 ML: 200 INHALANT RESPIRATORY (INHALATION) at 15:01

## 2019-01-01 RX ADMIN — HEPARIN SODIUM 5000 UNITS: 5000 INJECTION INTRAVENOUS; SUBCUTANEOUS at 13:41

## 2019-01-01 RX ADMIN — CEFEPIME 2 G: 2 INJECTION, POWDER, FOR SOLUTION INTRAVENOUS at 06:30

## 2019-01-01 RX ADMIN — LORAZEPAM 1 MG: 2 INJECTION INTRAMUSCULAR; INTRAVENOUS at 08:12

## 2019-01-01 RX ADMIN — OXYCODONE HYDROCHLORIDE 5 MG: 5 TABLET ORAL at 11:37

## 2019-01-01 RX ADMIN — FUROSEMIDE 40 MG: 10 INJECTION, SOLUTION INTRAMUSCULAR; INTRAVENOUS at 20:36

## 2019-01-01 RX ADMIN — OXYCODONE HYDROCHLORIDE 10 MG: 10 TABLET ORAL at 05:09

## 2019-01-01 RX ADMIN — OXYCODONE HYDROCHLORIDE 10 MG: 10 TABLET ORAL at 03:54

## 2019-01-01 RX ADMIN — MORPHINE SULFATE 5 MG: 10 INJECTION INTRAVENOUS at 17:46

## 2019-01-01 RX ADMIN — OXYCODONE HYDROCHLORIDE 5 MG: 5 TABLET ORAL at 13:01

## 2019-01-01 RX ADMIN — IOHEXOL 100 ML: 350 INJECTION, SOLUTION INTRAVENOUS at 15:55

## 2019-01-01 RX ADMIN — IPRATROPIUM BROMIDE AND ALBUTEROL SULFATE 3 ML: .5; 3 SOLUTION RESPIRATORY (INHALATION) at 11:46

## 2019-01-01 RX ADMIN — DRONABINOL 5 MG: 5 CAPSULE ORAL at 17:31

## 2019-01-01 RX ADMIN — OXYCODONE HYDROCHLORIDE 10 MG: 10 TABLET ORAL at 08:03

## 2019-01-01 RX ADMIN — GUAIFENESIN 1200 MG: 600 TABLET, EXTENDED RELEASE ORAL at 04:50

## 2019-01-01 RX ADMIN — GABAPENTIN 600 MG: 300 CAPSULE ORAL at 17:31

## 2019-01-01 RX ADMIN — SENNOSIDES, DOCUSATE SODIUM 2 TABLET: 50; 8.6 TABLET, FILM COATED ORAL at 05:29

## 2019-01-01 RX ADMIN — HEPARIN SODIUM 5000 UNITS: 5000 INJECTION INTRAVENOUS; SUBCUTANEOUS at 06:55

## 2019-01-01 RX ADMIN — GUAIFENESIN 1200 MG: 600 TABLET, EXTENDED RELEASE ORAL at 17:17

## 2019-01-01 RX ADMIN — METRONIDAZOLE 500 MG: 500 TABLET, FILM COATED ORAL at 13:42

## 2019-01-01 RX ADMIN — IPRATROPIUM BROMIDE AND ALBUTEROL SULFATE 3 ML: .5; 3 SOLUTION RESPIRATORY (INHALATION) at 06:48

## 2019-01-01 RX ADMIN — PIPERACILLIN SODIUM AND TAZOBACTAM SODIUM 3.38 G: 3; .375 INJECTION, POWDER, FOR SOLUTION INTRAVENOUS at 09:32

## 2019-01-01 RX ADMIN — MORPHINE SULFATE 60 MG: 60 TABLET, EXTENDED RELEASE ORAL at 21:48

## 2019-01-01 RX ADMIN — MORPHINE SULFATE 2 MG: 4 INJECTION INTRAVENOUS at 09:39

## 2019-01-01 RX ADMIN — METRONIDAZOLE 500 MG: 500 TABLET, FILM COATED ORAL at 06:54

## 2019-01-01 RX ADMIN — METRONIDAZOLE 500 MG: 500 TABLET, FILM COATED ORAL at 21:32

## 2019-01-01 RX ADMIN — OXYCODONE HYDROCHLORIDE 10 MG: 10 TABLET ORAL at 20:53

## 2019-01-01 RX ADMIN — ENOXAPARIN SODIUM 40 MG: 100 INJECTION SUBCUTANEOUS at 05:41

## 2019-01-01 RX ADMIN — IPRATROPIUM BROMIDE AND ALBUTEROL SULFATE 3 ML: .5; 3 SOLUTION RESPIRATORY (INHALATION) at 09:02

## 2019-01-01 RX ADMIN — MORPHINE SULFATE 2 MG: 4 INJECTION INTRAVENOUS at 19:32

## 2019-01-01 RX ADMIN — VANCOMYCIN HYDROCHLORIDE 2000 MG: 500 INJECTION, POWDER, LYOPHILIZED, FOR SOLUTION INTRAVENOUS at 22:59

## 2019-01-01 RX ADMIN — IPRATROPIUM BROMIDE AND ALBUTEROL SULFATE 3 ML: .5; 3 SOLUTION RESPIRATORY (INHALATION) at 19:39

## 2019-01-01 RX ADMIN — ALBUTEROL SULFATE 2 PUFF: 90 AEROSOL, METERED RESPIRATORY (INHALATION) at 05:35

## 2019-01-01 RX ADMIN — ALBUTEROL SULFATE 2 PUFF: 90 AEROSOL, METERED RESPIRATORY (INHALATION) at 01:17

## 2019-01-01 RX ADMIN — OXYCODONE HYDROCHLORIDE 10 MG: 10 TABLET ORAL at 10:15

## 2019-01-01 RX ADMIN — ALBUTEROL SULFATE 2 PUFF: 90 AEROSOL, METERED RESPIRATORY (INHALATION) at 01:08

## 2019-01-01 RX ADMIN — OXYCODONE HYDROCHLORIDE 10 MG: 10 TABLET ORAL at 12:59

## 2019-01-01 RX ADMIN — IPRATROPIUM BROMIDE AND ALBUTEROL SULFATE 3 ML: .5; 3 SOLUTION RESPIRATORY (INHALATION) at 18:49

## 2019-01-01 RX ADMIN — PIPERACILLIN SODIUM AND TAZOBACTAM SODIUM 3.38 G: 3; .375 INJECTION, POWDER, FOR SOLUTION INTRAVENOUS at 17:19

## 2019-01-01 RX ADMIN — MORPHINE SULFATE 5 MG: 10 INJECTION INTRAVENOUS at 05:38

## 2019-01-01 RX ADMIN — OXYCODONE HYDROCHLORIDE 10 MG: 10 TABLET ORAL at 21:43

## 2019-01-01 RX ADMIN — FUROSEMIDE 40 MG: 10 INJECTION, SOLUTION INTRAMUSCULAR; INTRAVENOUS at 12:30

## 2019-01-01 RX ADMIN — IPRATROPIUM BROMIDE AND ALBUTEROL SULFATE 3 ML: .5; 3 SOLUTION RESPIRATORY (INHALATION) at 16:20

## 2019-01-01 RX ADMIN — GUAIFENESIN 1200 MG: 600 TABLET, EXTENDED RELEASE ORAL at 20:36

## 2019-01-01 RX ADMIN — GABAPENTIN 300 MG: 300 CAPSULE ORAL at 11:06

## 2019-01-01 RX ADMIN — IPRATROPIUM BROMIDE AND ALBUTEROL SULFATE 3 ML: .5; 3 SOLUTION RESPIRATORY (INHALATION) at 07:08

## 2019-01-01 RX ADMIN — SENNOSIDES, DOCUSATE SODIUM 2 TABLET: 50; 8.6 TABLET, FILM COATED ORAL at 17:07

## 2019-01-01 RX ADMIN — SENNOSIDES, DOCUSATE SODIUM 2 TABLET: 50; 8.6 TABLET, FILM COATED ORAL at 05:23

## 2019-01-01 RX ADMIN — OXYCODONE HYDROCHLORIDE 5 MG: 5 TABLET ORAL at 02:12

## 2019-01-01 RX ADMIN — CEFEPIME 2 G: 2 INJECTION, POWDER, FOR SOLUTION INTRAVENOUS at 21:33

## 2019-01-01 RX ADMIN — ACETYLCYSTEINE 3 ML: 200 INHALANT RESPIRATORY (INHALATION) at 11:09

## 2019-01-01 RX ADMIN — IPRATROPIUM BROMIDE AND ALBUTEROL SULFATE 3 ML: .5; 3 SOLUTION RESPIRATORY (INHALATION) at 18:48

## 2019-01-01 RX ADMIN — IPRATROPIUM BROMIDE AND ALBUTEROL SULFATE 3 ML: .5; 3 SOLUTION RESPIRATORY (INHALATION) at 16:18

## 2019-01-01 RX ADMIN — OXYCODONE HYDROCHLORIDE 5 MG: 5 TABLET ORAL at 17:07

## 2019-01-01 RX ADMIN — IPRATROPIUM BROMIDE AND ALBUTEROL SULFATE 3 ML: .5; 3 SOLUTION RESPIRATORY (INHALATION) at 11:54

## 2019-01-01 RX ADMIN — ACETYLCYSTEINE 3 ML: 200 INHALANT RESPIRATORY (INHALATION) at 02:33

## 2019-01-01 RX ADMIN — GUAIFENESIN 1200 MG: 600 TABLET, EXTENDED RELEASE ORAL at 04:56

## 2019-01-01 RX ADMIN — GABAPENTIN 300 MG: 300 CAPSULE ORAL at 00:51

## 2019-01-01 RX ADMIN — ACETYLCYSTEINE 3 ML: 200 INHALANT RESPIRATORY (INHALATION) at 10:03

## 2019-01-01 RX ADMIN — ENOXAPARIN SODIUM 40 MG: 100 INJECTION SUBCUTANEOUS at 04:47

## 2019-01-01 ASSESSMENT — ENCOUNTER SYMPTOMS
SENSORY CHANGE: 0
NAUSEA: 0
SENSORY CHANGE: 0
EYE REDNESS: 0
VOMITING: 0
VOMITING: 0
MEMORY LOSS: 0
SHORTNESS OF BREATH: 1
DIZZINESS: 0
HEADACHES: 0
SPEECH CHANGE: 0
VOMITING: 0
HALLUCINATIONS: 0
BACK PAIN: 0
SPUTUM PRODUCTION: 0
FEVER: 0
SORE THROAT: 0
STRIDOR: 0
EYE DISCHARGE: 0
FEVER: 0
CHILLS: 0
SHORTNESS OF BREATH: 1
SENSORY CHANGE: 0
DEPRESSION: 0
PALPITATIONS: 0
CHILLS: 0
DIARRHEA: 0
VOMITING: 0
COUGH: 1
WHEEZING: 1
SORE THROAT: 0
WHEEZING: 0
HEADACHES: 0
NECK PAIN: 0
CLAUDICATION: 0
COUGH: 1
ORTHOPNEA: 0
NERVOUS/ANXIOUS: 0
SPEECH CHANGE: 0
CONSTIPATION: 0
SHORTNESS OF BREATH: 1
WEAKNESS: 0
FOCAL WEAKNESS: 0
CLAUDICATION: 0
FEVER: 0
MYALGIAS: 0
PHOTOPHOBIA: 0
WHEEZING: 1
NECK PAIN: 0
HEADACHES: 0
FEVER: 0
HALLUCINATIONS: 0
EYE DISCHARGE: 0
CLAUDICATION: 0
CONSTIPATION: 0
HEMOPTYSIS: 0
SORE THROAT: 0
SINUS PAIN: 0
NECK PAIN: 0
PALPITATIONS: 0
HEARTBURN: 0
NECK PAIN: 0
MYALGIAS: 0
COUGH: 1
CONSTITUTIONAL NEGATIVE: 1
DIZZINESS: 0
ABDOMINAL PAIN: 0
PHOTOPHOBIA: 0
DIAPHORESIS: 0
PHOTOPHOBIA: 0
BACK PAIN: 0
SPEECH CHANGE: 0
CHILLS: 0
BACK PAIN: 0
HEARTBURN: 0
DIARRHEA: 0
FOCAL WEAKNESS: 0
SHORTNESS OF BREATH: 1
FEVER: 0
SHORTNESS OF BREATH: 1
PALPITATIONS: 0
FOCAL WEAKNESS: 0
WEAKNESS: 0
SHORTNESS OF BREATH: 1
BLURRED VISION: 0
INSOMNIA: 0
INSOMNIA: 0
DIZZINESS: 0
WHEEZING: 0
WEAKNESS: 0
WEAKNESS: 0
ORTHOPNEA: 0
FEVER: 0
EYE PAIN: 0
ABDOMINAL PAIN: 0
ABDOMINAL PAIN: 0
SHORTNESS OF BREATH: 1
CONSTIPATION: 0
DEPRESSION: 0
VOMITING: 0
INSOMNIA: 0
DIAPHORESIS: 0
MYALGIAS: 0
HEADACHES: 0
SINUS PAIN: 0
BACK PAIN: 0
BLURRED VISION: 0
ABDOMINAL PAIN: 0
CONSTIPATION: 0
MYALGIAS: 0
SPUTUM PRODUCTION: 0
WEAKNESS: 0
EYE REDNESS: 0
WEAKNESS: 0
DIARRHEA: 0
PHOTOPHOBIA: 0
NAUSEA: 0
WEAKNESS: 0
DIARRHEA: 0
SPUTUM PRODUCTION: 1
MYALGIAS: 0
CLAUDICATION: 0
NERVOUS/ANXIOUS: 0
WHEEZING: 0
SPUTUM PRODUCTION: 1
FOCAL WEAKNESS: 0
VOMITING: 0
SPEECH CHANGE: 0
NECK PAIN: 0
HALLUCINATIONS: 0
PND: 0
ABDOMINAL PAIN: 0
BLURRED VISION: 0
COUGH: 1
DOUBLE VISION: 0
NAUSEA: 0
SPEECH CHANGE: 0
BLURRED VISION: 0
FOCAL WEAKNESS: 0
STRIDOR: 0
DIZZINESS: 0
CHILLS: 0
DIARRHEA: 0
MYALGIAS: 0
FEVER: 0
HEMOPTYSIS: 0
FLANK PAIN: 0
FEVER: 0
SENSORY CHANGE: 0
SPUTUM PRODUCTION: 1
WEAKNESS: 0
DIZZINESS: 0
NECK PAIN: 0
PSYCHIATRIC NEGATIVE: 1
EYE DISCHARGE: 0
STRIDOR: 0
FALLS: 0
DEPRESSION: 0
SHORTNESS OF BREATH: 1
CONSTIPATION: 0
FEVER: 0
WEAKNESS: 0
LOSS OF CONSCIOUSNESS: 0
WEIGHT LOSS: 0
SENSORY CHANGE: 0
DIARRHEA: 0
SENSORY CHANGE: 0
NAUSEA: 0
HEADACHES: 0
HEMOPTYSIS: 0
SENSORY CHANGE: 0
NAUSEA: 0
CHILLS: 0
ABDOMINAL PAIN: 0
HEADACHES: 0
WEAKNESS: 1
BACK PAIN: 0
FLANK PAIN: 0
ABDOMINAL PAIN: 0
EYE DISCHARGE: 0
COUGH: 0
INSOMNIA: 0
MYALGIAS: 0
STRIDOR: 0
SORE THROAT: 0
DIZZINESS: 0
TREMORS: 0
INSOMNIA: 0
VOMITING: 0
STRIDOR: 0
NECK PAIN: 0
DEPRESSION: 0
PALPITATIONS: 0
SHORTNESS OF BREATH: 1
CHILLS: 0
COUGH: 1
CONSTIPATION: 0
TREMORS: 0
DIARRHEA: 0
EYE REDNESS: 0
SPEECH CHANGE: 0
CHILLS: 0
WEAKNESS: 0
FLANK PAIN: 0
HALLUCINATIONS: 0
COUGH: 1
HEARTBURN: 0
CLAUDICATION: 0
PALPITATIONS: 0
COUGH: 0
SENSORY CHANGE: 0
FLANK PAIN: 0
WEIGHT LOSS: 0
SENSORY CHANGE: 0
EYE REDNESS: 0
DEPRESSION: 0
NAUSEA: 0
BLURRED VISION: 0
FOCAL WEAKNESS: 0
WHEEZING: 0
COUGH: 1
EYES NEGATIVE: 1
MUSCULOSKELETAL NEGATIVE: 1
SPUTUM PRODUCTION: 0
PHOTOPHOBIA: 0
PHOTOPHOBIA: 0
EYE REDNESS: 0
BLURRED VISION: 0
SHORTNESS OF BREATH: 1
PHOTOPHOBIA: 0
FLANK PAIN: 0
HEARTBURN: 0
CLAUDICATION: 0
FEVER: 0
SPUTUM PRODUCTION: 0
SHORTNESS OF BREATH: 1
FEVER: 0
DIAPHORESIS: 0
FOCAL WEAKNESS: 0
BRUISES/BLEEDS EASILY: 0
TREMORS: 0
WHEEZING: 0
NERVOUS/ANXIOUS: 0
HEARTBURN: 0
HEADACHES: 0
SPEECH CHANGE: 0
HEADACHES: 0
CHILLS: 0
ABDOMINAL PAIN: 0
FEVER: 0
DIARRHEA: 0
ABDOMINAL PAIN: 0
SORE THROAT: 0
TREMORS: 0
SENSORY CHANGE: 0
FLANK PAIN: 0
EYE DISCHARGE: 0
PHOTOPHOBIA: 0
BRUISES/BLEEDS EASILY: 0
HEARTBURN: 0
SPEECH CHANGE: 0
WHEEZING: 0
CONSTIPATION: 0
SPEECH CHANGE: 0
WEIGHT LOSS: 1
SENSORY CHANGE: 0
COUGH: 1
SHORTNESS OF BREATH: 0
MYALGIAS: 0
CLAUDICATION: 0
EYE REDNESS: 0
SPUTUM PRODUCTION: 1
SHORTNESS OF BREATH: 1
DOUBLE VISION: 0
TREMORS: 0
COUGH: 0
HEARTBURN: 0
DIARRHEA: 0
HEADACHES: 0
SORE THROAT: 0
GASTROINTESTINAL NEGATIVE: 1
DIARRHEA: 0
BLURRED VISION: 0
CLAUDICATION: 0
NERVOUS/ANXIOUS: 0
BACK PAIN: 0
BACK PAIN: 0
BLURRED VISION: 0
DEPRESSION: 0
COUGH: 0
SHORTNESS OF BREATH: 1
VOMITING: 0
SPUTUM PRODUCTION: 1
SPUTUM PRODUCTION: 1
CHILLS: 0
SORE THROAT: 0
COUGH: 0
DIZZINESS: 0
COUGH: 0
SORE THROAT: 0
DIZZINESS: 0
DIZZINESS: 0
DEPRESSION: 0
SORE THROAT: 0
SPEECH CHANGE: 0
VOMITING: 0
BLURRED VISION: 0
HEMOPTYSIS: 0
MYALGIAS: 0
INSOMNIA: 0
SPEECH CHANGE: 0
PND: 0
FEVER: 0
DIZZINESS: 0
SPEECH CHANGE: 0
WEAKNESS: 0
DEPRESSION: 0
NAUSEA: 0
FEVER: 0
BACK PAIN: 0
NERVOUS/ANXIOUS: 0
NERVOUS/ANXIOUS: 0
EYE DISCHARGE: 0
DOUBLE VISION: 0
WHEEZING: 0
HALLUCINATIONS: 0
SHORTNESS OF BREATH: 1
WHEEZING: 0
ORTHOPNEA: 0
DIZZINESS: 0
NEUROLOGICAL NEGATIVE: 1
SPUTUM PRODUCTION: 1
CONSTIPATION: 0
ABDOMINAL PAIN: 0
COUGH: 0
DEPRESSION: 0
DIARRHEA: 0
ABDOMINAL PAIN: 0
DIARRHEA: 0
CHILLS: 0
NERVOUS/ANXIOUS: 0
HALLUCINATIONS: 0
FOCAL WEAKNESS: 0
HEADACHES: 0
PALPITATIONS: 0
DIARRHEA: 0
HEARTBURN: 0
VOMITING: 0
SPUTUM PRODUCTION: 0
SPUTUM PRODUCTION: 1
FOCAL WEAKNESS: 0
PHOTOPHOBIA: 0
CONSTIPATION: 0
COUGH: 0
CHILLS: 0
DIARRHEA: 0
ABDOMINAL PAIN: 0
CHILLS: 0
DIAPHORESIS: 0
EYE PAIN: 0
VOMITING: 0
NECK PAIN: 0
EYE PAIN: 0
VOMITING: 0
CLAUDICATION: 0
WEAKNESS: 0
DIAPHORESIS: 0
SHORTNESS OF BREATH: 1
SPUTUM PRODUCTION: 0
POLYDIPSIA: 0
SENSORY CHANGE: 0
SPUTUM PRODUCTION: 1
CHILLS: 0
CARDIOVASCULAR NEGATIVE: 1
HEADACHES: 0
SPEECH CHANGE: 0
CHILLS: 0
DIZZINESS: 0
SPUTUM PRODUCTION: 1
CONSTIPATION: 0
STRIDOR: 0
ABDOMINAL PAIN: 0
TREMORS: 0
PALPITATIONS: 0
SPEECH CHANGE: 0
SORE THROAT: 0
ABDOMINAL PAIN: 0
STRIDOR: 0
MYALGIAS: 0
ABDOMINAL PAIN: 0
DEPRESSION: 0
FEVER: 0
CHILLS: 0
VOMITING: 0
SPEECH CHANGE: 0
WEAKNESS: 1
VOMITING: 0
PALPITATIONS: 0
FALLS: 0
CLAUDICATION: 0
MEMORY LOSS: 0
HEADACHES: 0
TREMORS: 0
DIARRHEA: 0
SHORTNESS OF BREATH: 1
BLURRED VISION: 0
BACK PAIN: 0
NECK PAIN: 0
VOMITING: 0
PALPITATIONS: 0
HEARTBURN: 0
SPUTUM PRODUCTION: 0
COUGH: 0
INSOMNIA: 0
WEAKNESS: 0

## 2019-01-01 ASSESSMENT — COGNITIVE AND FUNCTIONAL STATUS - GENERAL
SUGGESTED CMS G CODE MODIFIER MOBILITY: CK
SUGGESTED CMS G CODE MODIFIER DAILY ACTIVITY: CK
MOBILITY SCORE: 24
MOBILITY SCORE: 18
SUGGESTED CMS G CODE MODIFIER DAILY ACTIVITY: CH
DRESSING REGULAR LOWER BODY CLOTHING: A LITTLE
CLIMB 3 TO 5 STEPS WITH RAILING: A LITTLE
DAILY ACTIVITIY SCORE: 18
SUGGESTED CMS G CODE MODIFIER MOBILITY: CH
DAILY ACTIVITIY SCORE: 24
HELP NEEDED FOR BATHING: A LITTLE
WALKING IN HOSPITAL ROOM: A LITTLE
PERSONAL GROOMING: A LITTLE
DAILY ACTIVITIY SCORE: 24
EATING MEALS: A LITTLE
TURNING FROM BACK TO SIDE WHILE IN FLAT BAD: A LITTLE
MOVING TO AND FROM BED TO CHAIR: A LITTLE
TOILETING: A LITTLE
SUGGESTED CMS G CODE MODIFIER DAILY ACTIVITY: CH
MOBILITY SCORE: 24
SUGGESTED CMS G CODE MODIFIER MOBILITY: CH
DRESSING REGULAR UPPER BODY CLOTHING: A LITTLE
MOVING FROM LYING ON BACK TO SITTING ON SIDE OF FLAT BED: A LITTLE
STANDING UP FROM CHAIR USING ARMS: A LITTLE

## 2019-01-01 ASSESSMENT — LIFESTYLE VARIABLES
EVER_SMOKED: YES
EVER FELT BAD OR GUILTY ABOUT YOUR DRINKING: NO
SUBSTANCE_ABUSE: 0
TOTAL SCORE: 0
CONSUMPTION TOTAL: NEGATIVE
HOW MANY TIMES IN THE PAST YEAR HAVE YOU HAD 5 OR MORE DRINKS IN A DAY: 0
EVER_SMOKED: YES
DO YOU DRINK ALCOHOL: NO
HAVE YOU EVER FELT YOU SHOULD CUT DOWN ON YOUR DRINKING: NO
DOES PATIENT WANT TO STOP DRINKING: NO
ON A TYPICAL DAY WHEN YOU DRINK ALCOHOL HOW MANY DRINKS DO YOU HAVE: 0
SUBSTANCE_ABUSE: 0
TOTAL SCORE: 0
AVERAGE NUMBER OF DAYS PER WEEK YOU HAVE A DRINK CONTAINING ALCOHOL: 0
SUBSTANCE_ABUSE: 0
TOTAL SCORE: 0
HAVE YOU EVER FELT YOU SHOULD CUT DOWN ON YOUR DRINKING: NO
SUBSTANCE_ABUSE: 0
EVER HAD A DRINK FIRST THING IN THE MORNING TO STEADY YOUR NERVES TO GET RID OF A HANGOVER: NO
SUBSTANCE_ABUSE: 0
CONSUMPTION TOTAL: NEGATIVE
HAVE PEOPLE ANNOYED YOU BY CRITICIZING YOUR DRINKING: NO
ON A TYPICAL DAY WHEN YOU DRINK ALCOHOL HOW MANY DRINKS DO YOU HAVE: 0
HOW MANY TIMES IN THE PAST YEAR HAVE YOU HAD 5 OR MORE DRINKS IN A DAY: 0
EVER_SMOKED: YES
ALCOHOL_USE: NO
TOTAL SCORE: 0
EVER_SMOKED: YES
EVER HAD A DRINK FIRST THING IN THE MORNING TO STEADY YOUR NERVES TO GET RID OF A HANGOVER: NO
ALCOHOL_USE: NO
EVER FELT BAD OR GUILTY ABOUT YOUR DRINKING: NO
AVERAGE NUMBER OF DAYS PER WEEK YOU HAVE A DRINK CONTAINING ALCOHOL: 0
TOTAL SCORE: 0
SUBSTANCE_ABUSE: 0
DOES PATIENT WANT TO STOP DRINKING: NO
TOTAL SCORE: 0
HAVE PEOPLE ANNOYED YOU BY CRITICIZING YOUR DRINKING: NO

## 2019-01-01 ASSESSMENT — ACTIVITIES OF DAILY LIVING (ADL)
DRESSING_REQUIRES_ASSISTANCE: 1
CONTINENCE_REQUIRES_ASSISTANCE: 1
TOILETING: INDEPENDENT
PHYSICAL_TRANSFER_REQUIRES_ASSISTANCE: 1
AMBULATION_REQUIRES_ASSISTANCE: 1
BATHING_REQUIRES_ASSISTANCE: 1

## 2019-01-01 ASSESSMENT — COPD QUESTIONNAIRES
DO YOU EVER COUGH UP ANY MUCUS OR PHLEGM?: NO/ONLY WITH OCCASIONAL COLDS OR INFECTIONS
COPD SCREENING SCORE: 4
COPD: 1
HAVE YOU SMOKED AT LEAST 100 CIGARETTES IN YOUR ENTIRE LIFE: NO/DON'T KNOW
DURING THE PAST 4 WEEKS HOW MUCH DID YOU FEEL SHORT OF BREATH: SOME OF THE TIME
DO YOU EVER COUGH UP ANY MUCUS OR PHLEGM?: NO/ONLY WITH OCCASIONAL COLDS OR INFECTIONS
COPD SCREENING SCORE: 7
HAVE YOU SMOKED AT LEAST 100 CIGARETTES IN YOUR ENTIRE LIFE: YES
DURING THE PAST 4 WEEKS HOW MUCH DID YOU FEEL SHORT OF BREATH: SOME OF THE TIME

## 2019-01-01 ASSESSMENT — GAIT ASSESSMENTS
DISTANCE (FEET): 215
GAIT LEVEL OF ASSIST: SUPERVISED

## 2019-01-01 ASSESSMENT — PATIENT HEALTH QUESTIONNAIRE - PHQ9
2. FEELING DOWN, DEPRESSED, IRRITABLE, OR HOPELESS: NOT AT ALL
2. FEELING DOWN, DEPRESSED, IRRITABLE, OR HOPELESS: NOT AT ALL
CLINICAL INTERPRETATION OF PHQ2 SCORE: 0
SUM OF ALL RESPONSES TO PHQ9 QUESTIONS 1 AND 2: 0
SUM OF ALL RESPONSES TO PHQ9 QUESTIONS 1 AND 2: 0
1. LITTLE INTEREST OR PLEASURE IN DOING THINGS: NOT AT ALL
1. LITTLE INTEREST OR PLEASURE IN DOING THINGS: NOT AT ALL

## 2019-05-08 NOTE — OR SURGEON
Immediate Post- Operative Note        PostOp Diagnosis: Right lung mass    Procedure(s):CT guided Bx      Estimated Blood Loss: Less than 5 ml        Complications: None            5/8/2019     8:33 AM     Andres Macias

## 2019-05-08 NOTE — DISCHARGE INSTRUCTIONS
ACTIVITY: Rest and take it easy for the first 24 hours.  A responsible adult is recommended to remain with you during that time.  It is normal to feel sleepy.  We encourage you to not do anything that requires balance, judgment or coordination.    MILD FLU-LIKE SYMPTOMS ARE NORMAL. YOU MAY EXPERIENCE GENERALIZED MUSCLE ACHES, THROAT IRRITATION, HEADACHE AND/OR SOME NAUSEA.    FOR 24 HOURS DO NOT:  Drive, operate machinery or run household appliances.  Drink beer or alcoholic beverages.   Make important decisions or sign legal documents.    SPECIAL INSTRUCTIONS: follow up with primary care physician as needed  Resume your home medications  If you experience chest pain, shortness of breath call 911 return to ER   Keep dressing clean dry intact for 24 hours, remove dressing after 24 hours.     DIET: To avoid nausea, slowly advance diet as tolerated, avoiding spicy or greasy foods for the first day.  Add more substantial food to your diet according to your physician's instructions.  Babies can be fed formula or breast milk as soon as they are hungry.  INCREASE FLUIDS AND FIBER TO AVOID CONSTIPATION.    SURGICAL DRESSING/BATHING: keep dressing clean dry intact for 24 hours, remove dressing after 24 hours.  Shower after 24 hours.     FOLLOW-UP APPOINTMENT:  A follow-up appointment should be arranged with your doctor in [phone]; call to schedule.    You should CALL YOUR PHYSICIAN if you develop:  Fever greater than 101 degrees F.  Pain not relieved by medication, or persistent nausea or vomiting.  Excessive bleeding (blood soaking through dressing) or unexpected drainage from the wound.  Extreme redness or swelling around the incision site, drainage of pus or foul smelling drainage.  Inability to urinate or empty your bladder within 8 hours.  Problems with breathing or chest pain.    You should call 911 if you develop problems with breathing or chest pain.  If you are unable to contact your doctor or surgical center, you  should go to the nearest emergency room or urgent care center.  Physician's telephone #: [phone]    If any questions arise, call your doctor.  If your doctor is not available, please feel free to call the Surgical Center at [phone].  The Center is open Monday through Friday from 7AM to 7PM.  You can also call the [facility] HOTLINE open 24 hours/day, 7 days/week and speak to a nurse at [phone], or toll free at [phone].    A registered nurse may call you a few days after your surgery to see how you are doing after your procedure.    MEDICATIONS: Resume taking daily medication.  Take prescribed pain medication with food.  If no medication is prescribed, you may take non-aspirin pain medication if needed.  PAIN MEDICATION CAN BE VERY CONSTIPATING.  Take a stool softener or laxative such as senokot, pericolace, or milk of magnesia if needed.  Lung Biopsy  A lung biopsy is a procedure in which a tissue sample is removed from the lung. The tissue can be examined under a microscope to help diagnose various lung disorders.   LET YOUR HEALTH CARE PROVIDER KNOW ABOUT:  · Any allergies you have.  · All medicines you are taking, including vitamins, herbs, eye drops, creams, and over-the-counter medicines.  · Previous problems you or members of your family have had with the use of anesthetics.  · Any blood disorders or bleeding problems that you have.  · Previous surgeries you have had.  · Medical conditions you have.  RISKS AND COMPLICATIONS  Generally, a lung biopsy is a safe procedure. However, problems can occur and include:  · Collapse of the lung.    · Bleeding.    · Infection.    BEFORE THE PROCEDURE  · Do not eat or drink anything after midnight on the night before the procedure or as directed by your health care provider.  · Ask your health care provider about changing or stopping your regular medicines. This is especially important if you are taking diabetes medicines or blood thinners.  · Plan to have  someone take you home after the procedure.  PROCEDURE  Various methods can be used to perform a lung biopsy:   · Needle biopsy. A biopsy needle is inserted into the lung. The needle is used to collect the tissue sample. A CT scanner may be used to guide the needle to the right place in the lung. For this method, a medicine is used to numb the area where the biopsy sample will be taken (local anesthetic).  · Bronchoscopy. A flexible tube (bronchoscope) is inserted into your lungs by going through your mouth or nose. A needle or forceps is passed through the bronchoscope to remove the tissue sample. For this method, medicine may be used to numb the back of your throat.  · Open biopsy. A cut (incision) is made in your chest. The tissue sample is then removed using surgical tools. The incision is closed with skin glue, skin adhesive strips, or stitches. For this method, you will be given medicine to make you sleep through the procedure (general anesthetic).  AFTER THE PROCEDURE  · Your recovery will be assessed and monitored.  · You might have soreness and tenderness at the site of the biopsy for a few days after the procedure.  · You might have a cough and some soreness in your throat for a few days if a bronchoscope was used.  This information is not intended to replace advice given to you by your health care provider. Make sure you discuss any questions you have with your health care provider.  Document Released: 03/08/2006 Document Revised: 01/08/2016 Document Reviewed: 06/01/2014  [employer] Patient Education © 2017 [employer]      If your physician has prescribed pain medication that includes Acetaminophen (Tylenol), do not take additional Acetaminophen (Tylenol) while taking the prescribed medication.    Depression / Suicide Risk    As you are discharged from this [facility] facility, it is important to learn how to keep safe from harming yourself.    Recognize the warning signs:  · Abrupt changes  in personality, positive or negative- including increase in energy   · Giving away possessions  · Change in eating patterns- significant weight changes-  positive or negative  · Change in sleeping patterns- unable to sleep or sleeping all the time   · Unwillingness or inability to communicate  · Depression  · Unusual sadness, discouragement and loneliness  · Talk of wanting to die  · Neglect of personal appearance   · Rebelliousness- reckless behavior  · Withdrawal from people/activities they love  · Confusion- inability to concentrate     If you or a loved one observes any of these behaviors or has concerns about self-harm, here's what you can do:  · Talk about it- your feelings and reasons for harming yourself  · Remove any means that you might use to hurt yourself (examples: pills, rope, extension cords, firearm)  · Get professional help from the community (Mental Health, Substance Abuse, psychological counseling)  · Do not be alone:Call your Safe Contact- someone whom you trust who will be there for you.  · Call your local CRISIS HOTLINE 570-9627 or 470-756-3732  · Call your local Children's Mobile Crisis Response Team [place] (772) 080-0079 or www.[employer]  · Call the toll free National Suicide Prevention Hotlines   · National Suicide Prevention Lifeline 962-733-JOSK (5860)  · National Hope Line Network 800-SUICIDE (357-2573)

## 2019-05-08 NOTE — OR NURSING
0842 Patient arrived from IR s/p right lung biopsy. Patient awake. Posterior access site dressing clean dry intact. Patient not on any distress of discomfort. vss.  0936 post chest x-ray complete.   1040 patient given water tolerating well. No c/o pain.   1140 2nd xray complete  1240 Criteria met to discharge patient home.   1248 discharge instructions given to patient, patient verbalize understanding of the orders, iv discontinued tip intact. Patient not in any distress or discomfort. Reviewed medications, activity, follow up, worsening symptoms.   1255 patient escorted via w/c with all his personal belongings.

## 2019-05-15 NOTE — PROCEDURES
Multi-Ox Readings  Multi Ox #1 Room air   O2 sat % at rest 95   O2 sat % on exertion 88   O2 sat average on exertion 90   Multi Ox #2     O2 sat % at rest     O2 sat % on exertion     O2 sat average on exertion       Oxygen Use     Oxygen Frequency     Duration of need     Is the patient mobile within the home?     CPAP Use?     BIPAP Use?     Servo Titration

## 2019-05-15 NOTE — LETTER
Sheryl Lake  [facility] Pulmonary Medicine   [address] [place] [address] [place] [address]  Phone: [phone] - Fax: [phone]           Encounter Date: 5/15/2019  Provider: Sheryl Lake  Location of Care: [facility] PULMONARY MEDICINE      Patient:   Ladarius Khan   MR Number: [id number]   YOB: 1950     PROGRESS NOTE:  Chief Complaint   Patient presents with   • Establish Care     REFERRAL DAVID DONOVAN LUNG MASS        HPI: This patient is a 68 y.o. male presenting for evaluation of R sided chest mass.  The patient has no significant past medical history other than bladder cancer treated in .  He does have some mild environmental allergies that he does not take any specific therapy for.  He does have a significant tobacco history of greater than 50 years and quit recently on  of this year.  He does not drink alcohol and is currently retired from working in the oil and gas industry.  He denies any asbestos exposure.  He did have a father who  from lung cancer at the age of 42 who he believes did have some asbestos exposure as he worked in bricks.  With regards to his recent past medical history, the patient presented to an urgent care in [place] where he resides with symptoms of chest and upper airway congestion.  He was treated empirically for an upper respiratory tract infection however when symptoms did not improve and cough and chest congestion continued, he had further evaluation with chest x-ray which showed a large right sided chest mass.  This was evaluated with a CT which showed large right-sided lung mass involving both right upper and right middle lobes with invasion of the mediastinum.  He subsequently he underwent CT-guided biopsy which shows malignant neoplasm with final cellular diagnosis still pending.  The patient has been referred to cancer care specialist and saw Dr. Llanos today.  A PET scan and brain MRI were  ordered.  The patient does endorse ongoing cough productive of clear to white sputum, no hemoptysis.  He does have right-sided back pain with radiation to the front of his chest that is alleviated with hydrocodone given to him initially in urgent care and refilled by Dr. Llanos.  The patient does have shortness of breath with exertion but none at rest.  He has noted occasional night sweats which he originally attribute it to her respiratory tract infection in addition to a 15 pound weight loss.  He has no history of COPD or COPD exacerbation and has never been treated with inhalers or steroids in the past.      Past Medical History:   Diagnosis Date   • Breath shortness    • Cancer (HCC) 2008    bladder    • Pain     pain around rib cage to back    • Sinus infection 03/29/2019    nasal congestion, chest congestion        Social History     Social History   • Marital status: Single     Spouse name: N/A   • Number of children: N/A   • Years of education: N/A     Occupational History   • Not on file.     Social History Main Topics   • Smoking status: Former Smoker     Packs/day: 1.00     Years: 50.00     Types: Cigarettes     Quit date: 4/1/2019   • Smokeless tobacco: Never Used   • Alcohol use No   • Drug use: Yes     Types: Marijuana   • Sexual activity: Not on file     Other Topics Concern   • Not on file     Social History Narrative   • No narrative on file       Family History   Problem Relation Age of Onset   • No Known Problems Mother    • Lung Cancer Father        Current Outpatient Prescriptions on File Prior to Visit   Medication Sig Dispense Refill   • guaifenesin dextromethorphan sugar free (TUSSIN DM)  MG/5ML Liquid soln Take 10 mL by mouth every four hours as needed for Cough.     • Naproxen Sodium (ALEVE) 220 MG Cap Take 1 Cap by mouth every 3 hours as needed. alternating with ibuprofen     • albuterol (VENTOLIN HFA) 108 (90 Base) MCG/ACT Aero Soln inhalation aerosol Inhale 2 Puffs by mouth every  "four hours as needed for Shortness of Breath.     • ibuprofen (MOTRIN) 200 MG Tab Take 200 mg by mouth every 3 hours as needed.     • Dextromethorphan-Guaifenesin (MUCINEX DM MAXIMUM STRENGTH)  MG TABLET SR 12 HR Take 1 Tab by mouth as needed.     • acetaminophen (TYLENOL) 500 MG Tab Take 500 mg by mouth every 6 hours as needed.       No current facility-administered medications on file prior to visit.        Allergies: Patient has no known allergies.    ROS:   Review of Systems   Constitutional: Positive for weight loss. Negative for chills, diaphoresis, fever and malaise/fatigue.   HENT: Negative for congestion, ear discharge, ear pain, hearing loss, nosebleeds, sinus pain, sore throat and tinnitus.    Eyes: Negative for blurred vision, double vision, photophobia, pain, discharge and redness.   Respiratory: Positive for cough and sputum production. Negative for hemoptysis, shortness of breath, wheezing and stridor.    Cardiovascular: Positive for chest pain. Negative for palpitations, orthopnea, claudication, leg swelling and PND.   Gastrointestinal: Negative for abdominal pain, constipation, diarrhea, heartburn, nausea and vomiting.   Genitourinary: Negative for dysuria and urgency.   Musculoskeletal: Negative for back pain, falls, joint pain, myalgias and neck pain.   Skin: Negative for itching and rash.   Neurological: Negative for dizziness, tremors, speech change, focal weakness, weakness and headaches.   Endo/Heme/Allergies: Negative for environmental allergies.   Psychiatric/Behavioral: Negative for depression.       /70 (BP Location: Left arm, Patient Position: Sitting, BP Cuff Size: Large adult)   Pulse (!) 107   Temp 36.6 °C (97.9 °F) (Temporal)   Resp 16   Ht 1.905 m (6' 3\")   Wt 92.1 kg (203 lb)   SpO2 94%     Physical Exam:  Physical Exam   Constitutional: He is oriented to person, place, and time. He appears well-developed and well-nourished. No distress.   HENT:   Head: " Normocephalic and atraumatic.   Mouth/Throat: Oropharynx is clear and moist. No oropharyngeal exudate.   Eyes: Pupils are equal, round, and reactive to light. Conjunctivae and EOM are normal. No scleral icterus.   Neck: Normal range of motion. Neck supple. No tracheal deviation present.   Cardiovascular: Regular rhythm and normal heart sounds.  Exam reveals no gallop and no friction rub.    No murmur heard.  tachy   Pulmonary/Chest: Effort normal. No respiratory distress.   Coarse bs throughout R lung fields with both inspiratory and expiratory wheezes and decreased air movement, clear on L   Abdominal: Soft. He exhibits no distension.   Musculoskeletal: Normal range of motion. He exhibits deformity. He exhibits no edema.   + clubbing, no cyanosis   Neurological: He is alert and oriented to person, place, and time. No cranial nerve deficit.   Skin: Skin is warm and dry. No rash noted.   Psychiatric: He has a normal mood and affect.       Imaging as reviewed by me personally: CT chest 4/26/019 and CXR from 5/8/2019 reveiwed as per HPI    Assessment:  1. Mass of right lung  Multiple Oximetry   2. Tobacco use  Multiple Oximetry       Plan:  1. Malignancy but final cellular dx pending. He was seen by Dr. Llanos with cancer care specialists today and plans for PET and MRI brain.  Pending final dx tx will likely be chemo and xrt given extent of mass and mediastinal invasion.  His oxygenation was normal today in clinic and he currently has no e/o infx, no hemoptysis or clinical s/s of COPD. He did drop to 88% on multi-ox and was tachycardic to 120s however recovered to 91% w/o supplemental O2.  I think his main issue is tumor location and burdern and the faster we can start therapy, the fast his sxs will improve.  I did not order PFTs as it is unlikely he is a surgical candidate however if they are required or desired at any time during therapy I am happy to order them. In the meantime we will continue follow with oncology  to assist in pulmonary sxs that may arise or need for oxygen as his dx and tx evolves. I spoke at length with pt about this.   2. Pt has quit smoking since 4/1.  I congratulated him and encouraged his ongoing efforts.   Return in about 3 months (around 8/15/2019) for lung mass.          Electronically signed by Sheryl Lake  on 05/15/19    Wellington Llanos M.D.  [address] [place] [id number]  VIA Facsimile: [phone]

## 2019-05-15 NOTE — PROGRESS NOTES
Chief Complaint   Patient presents with   • Establish Care     REFERRAL DAVID Lindentamera DONOVAN LUNG MASS        HPI: This patient is a 68 y.o. male presenting for evaluation of R sided chest mass.  The patient has no significant past medical history other than bladder cancer treated in .  He does have some mild environmental allergies that he does not take any specific therapy for.  He does have a significant tobacco history of greater than 50 years and quit recently on  of this year.  He does not drink alcohol and is currently retired from working in the oil and gas industry.  He denies any asbestos exposure.  He did have a father who  from lung cancer at the age of 42 who he believes did have some asbestos exposure as he worked in [employer].  With regards to his recent past medical history, the patient presented to an urgent care in [place] where he resides with symptoms of chest and upper airway congestion.  He was treated empirically for an upper respiratory tract infection however when symptoms did not improve and cough and chest congestion continued, he had further evaluation with chest x-ray which showed a large right sided chest mass.  This was evaluated with a CT which showed large right-sided lung mass involving both right upper and right middle lobes with invasion of the mediastinum.  He subsequently he underwent CT-guided biopsy which shows malignant neoplasm with final cellular diagnosis still pending.  The patient has been referred to cancer care specialist and saw Dr. Llanos today.  A PET scan and brain MRI were ordered.  The patient does endorse ongoing cough productive of clear to white sputum, no hemoptysis.  He does have right-sided back pain with radiation to the front of his chest that is alleviated with hydrocodone given to him initially in urgent care and refilled by Dr. Llanos.  The patient does have shortness of breath with exertion but none at rest.  He has noted occasional night sweats  which he originally attribute it to her respiratory tract infection in addition to a 15 pound weight loss.  He has no history of COPD or COPD exacerbation and has never been treated with inhalers or steroids in the past.      Past Medical History:   Diagnosis Date   • Breath shortness    • Cancer (HCC) 2008    bladder    • Pain     pain around rib cage to back    • Sinus infection 03/29/2019    nasal congestion, chest congestion        Social History     Social History   • Marital status: Single     Spouse name: N/A   • Number of children: N/A   • Years of education: N/A     Occupational History   • Not on file.     Social History Main Topics   • Smoking status: Former Smoker     Packs/day: 1.00     Years: 50.00     Types: Cigarettes     Quit date: 4/1/2019   • Smokeless tobacco: Never Used   • Alcohol use No   • Drug use: Yes     Types: Marijuana   • Sexual activity: Not on file     Other Topics Concern   • Not on file     Social History Narrative   • No narrative on file       Family History   Problem Relation Age of Onset   • No Known Problems Mother    • Lung Cancer Father        Current Outpatient Prescriptions on File Prior to Visit   Medication Sig Dispense Refill   • guaifenesin dextromethorphan sugar free (TUSSIN DM)  MG/5ML Liquid soln Take 10 mL by mouth every four hours as needed for Cough.     • Naproxen Sodium (ALEVE) 220 MG Cap Take 1 Cap by mouth every 3 hours as needed. alternating with ibuprofen     • albuterol (VENTOLIN HFA) 108 (90 Base) MCG/ACT Aero Soln inhalation aerosol Inhale 2 Puffs by mouth every four hours as needed for Shortness of Breath.     • ibuprofen (MOTRIN) 200 MG Tab Take 200 mg by mouth every 3 hours as needed.     • Dextromethorphan-Guaifenesin (MUCINEX DM MAXIMUM STRENGTH)  MG TABLET SR 12 HR Take 1 Tab by mouth as needed.     • acetaminophen (TYLENOL) 500 MG Tab Take 500 mg by mouth every 6 hours as needed.       No current facility-administered medications on  "file prior to visit.        Allergies: Patient has no known allergies.    ROS:   Review of Systems   Constitutional: Positive for weight loss. Negative for chills, diaphoresis, fever and malaise/fatigue.   HENT: Negative for congestion, ear discharge, ear pain, hearing loss, nosebleeds, sinus pain, sore throat and tinnitus.    Eyes: Negative for blurred vision, double vision, photophobia, pain, discharge and redness.   Respiratory: Positive for cough and sputum production. Negative for hemoptysis, shortness of breath, wheezing and stridor.    Cardiovascular: Positive for chest pain. Negative for palpitations, orthopnea, claudication, leg swelling and PND.   Gastrointestinal: Negative for abdominal pain, constipation, diarrhea, heartburn, nausea and vomiting.   Genitourinary: Negative for dysuria and urgency.   Musculoskeletal: Negative for back pain, falls, joint pain, myalgias and neck pain.   Skin: Negative for itching and rash.   Neurological: Negative for dizziness, tremors, speech change, focal weakness, weakness and headaches.   Endo/Heme/Allergies: Negative for environmental allergies.   Psychiatric/Behavioral: Negative for depression.       /70 (BP Location: Left arm, Patient Position: Sitting, BP Cuff Size: Large adult)   Pulse (!) 107   Temp 36.6 °C (97.9 °F) (Temporal)   Resp 16   Ht 1.905 m (6' 3\")   Wt 92.1 kg (203 lb)   SpO2 94%     Physical Exam:  Physical Exam   Constitutional: He is oriented to person, place, and time. He appears well-developed and well-nourished. No distress.   HENT:   Head: Normocephalic and atraumatic.   Mouth/Throat: Oropharynx is clear and moist. No oropharyngeal exudate.   Eyes: Pupils are equal, round, and reactive to light. Conjunctivae and EOM are normal. No scleral icterus.   Neck: Normal range of motion. Neck supple. No tracheal deviation present.   Cardiovascular: Regular rhythm and normal heart sounds.  Exam reveals no gallop and no friction rub.    No " murmur heard.  tachy   Pulmonary/Chest: Effort normal. No respiratory distress.   Coarse bs throughout R lung fields with both inspiratory and expiratory wheezes and decreased air movement, clear on L   Abdominal: Soft. He exhibits no distension.   Musculoskeletal: Normal range of motion. He exhibits deformity. He exhibits no edema.   + clubbing, no cyanosis   Neurological: He is alert and oriented to person, place, and time. No cranial nerve deficit.   Skin: Skin is warm and dry. No rash noted.   Psychiatric: He has a normal mood and affect.       Imaging as reviewed by me personally: CT chest 4/26/019 and CXR from 5/8/2019 reveiwed as per HPI    Assessment:  1. Mass of right lung  Multiple Oximetry   2. Tobacco use  Multiple Oximetry       Plan:  1. Malignancy but final cellular dx pending. He was seen by Dr. Llanos with cancer care specialists today and plans for PET and MRI brain.  Pending final dx tx will likely be chemo and xrt given extent of mass and mediastinal invasion.  His oxygenation was normal today in clinic and he currently has no e/o infx, no hemoptysis or clinical s/s of COPD. He did drop to 88% on multi-ox and was tachycardic to 120s however recovered to 91% w/o supplemental O2.  I think his main issue is tumor location and burdern and the faster we can start therapy, the fast his sxs will improve.  I did not order PFTs as it is unlikely he is a surgical candidate however if they are required or desired at any time during therapy I am happy to order them. In the meantime we will continue follow with oncology to assist in pulmonary sxs that may arise or need for oxygen as his dx and tx evolves. I spoke at length with pt about this.   2. Pt has quit smoking since 4/1.  I congratulated him and encouraged his ongoing efforts.   Return in about 3 months (around 8/15/2019) for lung mass.

## 2019-05-28 NOTE — PROGRESS NOTES
Call placed to patient to verify oncology follow up.  Pt reports did see Dr Llanos on 5/24 and was given scan results and plan of care.  He has appointment tomorrow with [facility] for chemo education and starts treatment on June 3rd.  Pt is hoping treatment will help with his breathing and pain.  Pt lives out of town and has no support nearby.  He discussed would be driving self, but also has friend, who lives 40 minutes away that can drive him to appointments when needed.  Provided information to patient for [employer] and discussed possible lodging resources through them.  Provided education to patient.  Pt will not be treated at [facility], so no active cancer nurse navigation.  Did provide contact information to patient and he can call navigator as a resource.

## 2019-06-11 NOTE — ED TRIAGE NOTES
Pt comes in complaining of pain to the right side of his chest due to a large tumor. Hx of lung cancer and currently receiving treatment. Pt taking hydrocodone with no relief and he ran out of his morphine

## 2019-06-11 NOTE — ED NOTES
Patient awake alert and oriented x 4, [place] 15, bed in low position, call light within reach, on room air, unlabored breathing noted, no cough noted, interacts with staff, interactions noted as appropriate, interactive with friend at bedside.

## 2019-06-11 NOTE — ED NOTES
Patient awake alert and oriented x 4, [place] 15, bed in low position, call light within reach, on room air, unlabored breathing noted, no cough noted, interacts with staff, interactions noted as appropriate, speaking with friend at bedside.

## 2019-06-11 NOTE — ED PROVIDER NOTES
"ED Provider Note    CHIEF COMPLAINT  Chief Complaint   Patient presents with   • Cancer   • Pain       HPI  Ladarius Khan is a 68 y.o. male who presents with cancer pain.  Patient has a history of lung mass with associated pain.  This was just diagnosed and just started on chemotherapy.  He is followed by Dr. Llanos.  He was given a prescription for 2 weeks of the MS Contin.  Later given a prescription for Norco.  He ran out of the MS Contin and now has pain.  He states the Norco does not do a very good job controlling his pain.  He denies any acute symptoms.  He denies any change in the character or quality of his pain.  He states that it is just getting worse.  No shortness of breath or fever.    Patient is appointment with Dr. Llanos tomorrow where he can have his medications filled.    REVIEW OF SYSTEMS  Pertinent negative: As above    PAST MEDICAL HISTORY  Past Medical History:   Diagnosis Date   • Breath shortness    • Cancer (HCC) 2008    bladder    • Pain     pain around rib cage to back    • Sinus infection 03/29/2019    nasal congestion, chest congestion        SOCIAL HISTORY  Social History   Substance Use Topics   • Smoking status: Former Smoker     Packs/day: 1.00     Years: 50.00     Types: Cigarettes     Quit date: 4/1/2019   • Smokeless tobacco: Never Used   • Alcohol use No       SURGICAL HISTORY  Past Surgical History:   Procedure Laterality Date   • TRANS URETHRAL RESECTION BLADDER  5/28/08    Performed by RASHRAD TANNER at SURGERY [facility]   • APPENDECTOMY     • TONSILLECTOMY         ALLERGIES  No Known Allergies    PHYSICAL EXAM  VITAL SIGNS: /89   Pulse 92   Temp 36 °C (96.8 °F) (Temporal)   Resp 18   Ht 1.93 m (6' 4\")   Wt 86.2 kg (190 lb 0.6 oz)   SpO2 95%   BMI 23.13 kg/m²    Constitutional: Awake and alert. Nontoxic.  Thin male wearing oxygen  HENT:  Grossly normal  Eyes: Grossly normal  Neck: Normal range of motion  Cardiovascular: Normal heart rate   Thorax & " Lungs: Mild increased work of breathing wearing oxygen which she reports is chronic.  Decreased breath sounds throughout.  Decreased on the right.  Abdomen: Nontender  Skin:  No pathologic rash.   Extremities: Well perfused  Psychiatric: Affect normal      COURSE & MEDICAL DECISION MAKING  Patient presents with cancer related pain.  He had been on MS Contin previously.  He was given his regular home dose of MS Contin 15 mg orally.  He states that this will be fine until tomorrow when he can see his doctor to get another prescription.  He denies any changes of his symptoms including fevers, new chest pain, difficulty breathing.  Declines additional work-up.  At this point the patient will be discharged.  He will return to the ER for concerning symptoms.    FINAL IMPRESSION  1.  Lung cancer with associated pain      Disposition: home in stable condition      This dictation was created using voice recognition software. The accuracy of the dictation is limited to the abilities of the software.  The nursing notes were reviewed and certain aspects of this information were incorporated into this note.      Electronically signed by: Suresh Lamas, 6/11/2019 3:22 PM

## 2019-08-13 PROBLEM — Z85.51 HISTORY OF BLADDER CANCER: Status: ACTIVE | Noted: 2019-01-01

## 2019-08-13 PROBLEM — R60.0 PEDAL EDEMA: Status: ACTIVE | Noted: 2019-01-01

## 2019-08-13 PROBLEM — C34.81 MALIGNANT NEOPLASM OF OVERLAPPING SITES OF RIGHT LUNG (HCC): Status: ACTIVE | Noted: 2019-01-01

## 2019-08-13 NOTE — ASSESSMENT & PLAN NOTE
This is a chronic condition. He established with pulmonology in 5/2019 with a lung mass and it was noted to be malignant. He has follow up with pulmonology in a couple of days. He is established with oncology - Dr. Llanos. He is on oxygen. He has already started chemotherapy - tecentriq, carboplatin, and etoposide for 4 courses. He is going to follow up with oncology in 2 days.     He is on MS contin 45 mg bid with norco available for breakthrough pain. He is also on gabapentin for the nerve pain component.

## 2019-08-13 NOTE — PROGRESS NOTES
Subjective:     CC:  Diagnoses of Malignant neoplasm of overlapping sites of right lung (HCC), Pedal edema, and Need for hepatitis C screening test were pertinent to this visit.    HISTORY OF THE PRESENT ILLNESS: Patient is a 68 y.o. male. This pleasant patient is here today to establish care. He has no prior PCP. He lives near [place], [place].    Malignant neoplasm of overlapping sites of right lung (HCC)  This is a chronic condition. He established with pulmonology in 5/2019 with a lung mass and it was noted to be malignant. He has follow up with pulmonology in a couple of days. He is established with oncology - Dr. Llanos. He is on oxygen. He has already started chemotherapy - tecentriq, carboplatin, and etoposide for 4 courses. He is going to follow up with oncology in 2 days.     He is on MS contin 45 mg bid with norco available for breakthrough pain. He is also on gabapentin for the nerve pain component.     Pedal edema  This is a new condition. This is likely 2/2 the treatment for his lung cancer as he gets lots of saline with his chemo. He is on furosemide for the leg swelling. It hasn't been very effective.       Allergies: Patient has no known allergies.    Current Outpatient Medications Ordered in Epic   Medication Sig Dispense Refill   • furosemide (LASIX) 20 MG Tab Take 40 mg by mouth every day.  0   • gabapentin (NEURONTIN) 300 MG Cap Take 600 mg by mouth 3 times a day.  0   • ipratropium-albuterol (DUONEB) 0.5-2.5 (3) MG/3ML nebulizer solution inhale the contents of one vial via nebulizer as needed for breathing  1   • morphine ER (MS CONTIN) 15 MG Tab CR tablet Take 15 mg by mouth 2 Times a Day.  0   • morphine ER (MS CONTIN) 30 MG Tab CR tablet Take 30 mg by mouth 2 Times a Day.  0   • potassium chloride SA (KDUR) 20 MEQ Tab CR Take 20 mEq by mouth 2 Times a Day.  0   • HYDROcodone-acetaminophen (NORCO) 7.5-325 MG per tablet TAKE ONE TABLET BY MOUTH EVERY SIX HOURS AS NEEDED FOR FIVE DAYS  0   •  albuterol (VENTOLIN HFA) 108 (90 Base) MCG/ACT Aero Soln inhalation aerosol Inhale 2 Puffs by mouth every four hours as needed for Shortness of Breath.     • acetaminophen (TYLENOL) 500 MG Tab Take 500 mg by mouth every 6 hours as needed.     • Naproxen Sodium (ALEVE) 220 MG Cap Take 1 Cap by mouth every 3 hours as needed. alternating with ibuprofen       No current [facility]-ordered facility-administered medications on file.        Past Medical History:   Diagnosis Date   • Breath shortness    • Cancer (HCC)     bladder    • Pain     pain around rib cage to back    • Sinus infection 2019    nasal congestion, chest congestion        Past Surgical History:   Procedure Laterality Date   • TRANS URETHRAL RESECTION BLADDER  08    Performed by RASHARD TANNER at SURGERY [facility]   • APPENDECTOMY     • TONSILLECTOMY         Social History     Tobacco Use   • Smoking status: Former Smoker     Packs/day: 1.00     Years: 50.00     Pack years: 50.00     Types: Cigarettes     Last attempt to quit: 2019     Years since quittin.3   • Smokeless tobacco: Never Used   Substance Use Topics   • Alcohol use: No   • Drug use: Not Currently     Types: Marijuana, Inhaled     Comment:  history of marijuana use       Social History     Social History Narrative   • Not on file       Family History   Problem Relation Age of Onset   • No Known Problems Mother    • Lung Cancer Father         smoker   • Diabetes Neg Hx    • Heart Disease Neg Hx    • Stroke Neg Hx    • Drug abuse Neg Hx    • Alcohol abuse Neg Hx        Health Maintenance:   Vaccines deferred due to being on chemo  Colon cancer screening deferred as actively treating lung cancer    ROS:   Gen: no fevers/chills, + changes in weight - unintentional loss  Eyes: + changes in vision - hard to describe  ENT: no bloody nose  Pulm: + sob, no cough  CV: + chest pain  GI: no diarrhea  : no dysuria   MSk: + myalgias - diffuse  Skin: no rash  Neuro: no  "numbness/tingling      Objective:     Exam: /52 (BP Location: Right arm, Patient Position: Sitting, BP Cuff Size: Adult)   Pulse (!) 112   Temp 37.4 °C (99.3 °F) (Temporal)   Resp 16   Ht 1.905 m (6' 3\")   Wt 87.7 kg (193 lb 6.4 oz)   SpO2 (!) 85%  Body mass index is 24.17 kg/m².    General: Normal appearing. No distress.  HEENT: Normocephalic. Eyes conjunctiva clear lids without ptosis, pupils equal and reactive to light accommodation, ears normal shape and contour, canal with cerumen on the right and unable to visualize TM, canal clear on the left with normal TM, oropharynx is without erythema, edema or exudates.   Neck: Supple without JVD. Thyroid is not enlarged.  Pulmonary: On oxygen. Inspiratory wheezes and expiratory rhonchi diffusely.  Cardiovascular: Regular rate and rhythm without murmur. Carotid and radial pulses are intact and equal bilaterally.  Abdomen: Soft, nontender, nondistended. Normal bowel sounds. Liver and spleen are not palpable  Neurologic: Grossly nonfocal  Lymph: No cervical or supraclavicular lymph nodes are palpable  Skin: Warm and dry.  No obvious lesions.  Musculoskeletal: Normal gait. No extremity cyanosis, clubbing, or edema.  Psych: Normal mood and affect. Alert and oriented x3. Judgment and insight is normal.    Assessment & Plan:   68 y.o. male with the following -    1. Malignant neoplasm of overlapping sites of right lung (HCC)  This is a chronic condition.  He established with pulmonology in May, 2019 with a lung mass and it was noted to be malignant.  He is established with oncology, Dr. Llanos, and reports that he is already started chemotherapy with the following medications-Tecentriq, carboplatin, and etoposide.  He is following up with oncology in 2 days and has follow-up with pulmonology the same day.  -Shortness of breath management: Oxygen, DuoNeb as needed, and albuterol as needed  -Pain management: MS Contin 45 mg twice daily, gabapentin 600 mg 3 times " daily, acetaminophen as needed, and Norco PRN    2. Pedal edema  This is a new condition.  He reports that this is likely secondary to the chemotherapy because he gets a lot of normal saline with the chemotherapy and its lead to significant swelling in his legs.  He was prescribed furosemide and potassium to help with the swelling though his friend today reports is not making much improvement in the swelling.  -Continue furosemide and potassium    3. Need for hepatitis C screening test  - HEP C VIRUS ANTIBODY; Future    Return in about 3 months (around 11/13/2019) for Med check.    Please note that this dictation was created using voice recognition software. I have made every reasonable attempt to correct obvious errors, but I expect that there are errors of grammar and possibly content that I did not discover before finalizing the note.

## 2019-08-13 NOTE — LETTER
[facility]  Elizabeth Mosquera M.D.  [address]  [facility] [address]  Fax: [phone]   Authorization for Release/Disclosure of   Protected Health Information   Name: LADARIUS KHAN : 1950 SSN: [id number]   Address: [place] [address] Phone:    [phone] (home)    I authorize the entity listed below to release/disclose the PHI below to:   [facility]/Elizabeth Mosquera M.D. and Elizabeth Mosquera M.D.   Provider [facility] Entity Name[facility]  [facility]    Address   City, State, [place], [place]  Phone:      Fax:     Reason for request: continuity of care   Information to be released:    [  ] LAST COLONOSCOPY,  including any PATH REPORT and follow-up  [  ] LAST FIT/COLOGUARD RESULT [  ] LAST DEXA  [  ] LAST MAMMOGRAM  [  ] LAST PAP  [  ] LAST LABS [  ] RETINA EXAM REPORT  [  ] IMMUNIZATION RECORDS  [  ] Release all info      [  ] Check here and initial the line next to each item to release ALL health information INCLUDING  _____ Care and treatment for drug and / or alcohol abuse  _____ HIV testing, infection status, or AIDS  _____ Genetic Testing    DATES OF SERVICE OR TIME PERIOD TO BE DISCLOSED: _____________  I understand and acknowledge that:  * This Authorization may be revoked at any time by you in writing, except if your health information has already been used or disclosed.  * Your health information that will be used or disclosed as a result of you signing this authorization could be re-disclosed by the recipient. If this occurs, your re-disclosed health information may no longer be protected by State or Federal laws.  * You may refuse to sign this Authorization. Your refusal will not affect your ability to obtain treatment.  * This Authorization becomes effective upon signing and will  on (date) __________.      If no date is indicated, this Authorization will  one (1) year from the signature date.    Name: Ladarius Khan    Signature:   Date:     2019            PLEASE FAX REQUESTED RECORDS BACK TO: [phone]

## 2019-08-13 NOTE — ASSESSMENT & PLAN NOTE
This is a new condition. This is likely 2/2 the treatment for his lung cancer as he gets lots of saline with his chemo. He is on furosemide for the leg swelling. It hasn't been very effective.

## 2019-08-14 NOTE — TELEPHONE ENCOUNTER
Called and spoke with pt.  Notified pt of this. He said he will keep the appt.  See his oncologist after his CT scan is done. But if he has any questions he will make another appt with Dr Lake.

## 2019-08-14 NOTE — TELEPHONE ENCOUNTER
1. Caller Name: Ladarius                      Call Back Number: [phone] (home)       2. Message: Pt called and stated he has Lung cancer.  Pt wanted to know if he needs to r/s his appt tomorrow because he has a CT scan scheduled 08/22/19?    3. Patient approves office to leave a detailed voicemail/MyChart message: yes. Per pt okay to leave a detail message on his V/M

## 2019-08-14 NOTE — TELEPHONE ENCOUNTER
Sheryl Lake M.D.  You 2 hours ago (11:43 AM)     Hernesto Ramirez,     It is entirely up to the patient.  His main follow up should be with oncology but I am happy to see him or reschedule.  There is no urgency to see me.     Dr. Lake

## 2019-08-15 NOTE — PROGRESS NOTES
Chief Complaint   Patient presents with   • Follow-Up     Mass of right lung last seen 5/15/19    • Results     CTA 7/3/19          HPI: This patient is a 68 y.o. male whom is followed in our clinic for lung mass last seen by me on 5/15/19.  The patient has no significant past medical history other than bladder cancer treated in 2008.  He does have some mild environmental allergies that he does not take any specific therapy for.  He does have a significant tobacco history of greater than 50 years and quit recently on April 1 of this year.  The patient presented to an urgent care in [place] where he resides with symptoms of chest and upper airway congestion and treated empirically for an upper respiratory tract infection however when symptoms did not improve a chest x-ray which showed a large right sided chest mass.  F/U CT chest showed large right-sided lung mass involving both right upper and right middle lobes with invasion of the mediastinum.  He subsequently he underwent CT-guided biopsy which shows malignant neoplasm and he has since been seen by oncology and complete 4 rounds of chemotherapy.  He has formal CT to evaluate response in one week however, a CTA from 7/3/19 shows rapid increase in size of chest mass with narrowing of R main PA narrowing of branches of RUL.  The pt was admitted to [facility] [place] from 7/21-7/24 with worsening SOB and tx for post-obstructive PNA.  He presents today for routine f/u wondering if he still needs to f/u with me.  He feels his breathing is improved but does report some issues expectorating sputum on occasion. He does have some chest and back pain for which he is taking MS contin and norco.  He reports appetite is still good. Recent labs show new onset anemia with normal WBC and normal plts.     Past Medical History:   Diagnosis Date   • Breath shortness    • Cancer (HCC) 2008    bladder    • Pain     pain around rib cage to back    • Sinus infection 03/29/2019    nasal congestion,  chest congestion        Social History     Socioeconomic History   • Marital status: Single     Spouse name: Not on file   • Number of children: Not on file   • Years of education: Not on file   • Highest education level: Not on file   Occupational History   • Not on file   Social Needs   • Financial resource strain: Not on file   • Food insecurity:     Worry: Not on file     Inability: Not on file   • Transportation needs:     Medical: Not on file     Non-medical: Not on file   Tobacco Use   • Smoking status: Former Smoker     Packs/day: 1.00     Years: 50.00     Pack years: 50.00     Types: Cigarettes     Last attempt to quit: 2019     Years since quittin.3   • Smokeless tobacco: Never Used   Substance and Sexual Activity   • Alcohol use: No   • Drug use: Not Currently     Types: Marijuana, Inhaled     Comment:  history of marijuana use   • Sexual activity: Not Currently   Lifestyle   • Physical activity:     Days per week: Not on file     Minutes per session: Not on file   • Stress: Not on file   Relationships   • Social connections:     Talks on phone: Not on file     Gets together: Not on file     Attends Advent service: Not on file     Active member of club or organization: Not on file     Attends meetings of clubs or organizations: Not on file     Relationship status: Not on file   • Intimate partner violence:     Fear of current or ex partner: Not on file     Emotionally abused: Not on file     Physically abused: Not on file     Forced sexual activity: Not on file   Other Topics Concern   • Not on file   Social History Narrative   • Not on file       Family History   Problem Relation Age of Onset   • No Known Problems Mother    • Lung Cancer Father         smoker   • Diabetes Neg Hx    • Heart Disease Neg Hx    • Stroke Neg Hx    • Drug abuse Neg Hx    • Alcohol abuse Neg Hx        Current Outpatient Medications on File Prior to Visit   Medication Sig Dispense Refill   • furosemide (LASIX) 20 MG  Tab Take 40 mg by mouth every day.  0   • gabapentin (NEURONTIN) 300 MG Cap Take 600 mg by mouth 3 times a day.  0   • ipratropium-albuterol (DUONEB) 0.5-2.5 (3) MG/3ML nebulizer solution inhale the contents of one vial via nebulizer as needed for breathing  1   • morphine ER (MS CONTIN) 15 MG Tab CR tablet Take 15 mg by mouth 2 Times a Day.  0   • morphine ER (MS CONTIN) 30 MG Tab CR tablet Take 30 mg by mouth 2 Times a Day.  0   • potassium chloride SA (KDUR) 20 MEQ Tab CR Take 20 mEq by mouth 2 Times a Day.  0   • HYDROcodone-acetaminophen (NORCO) 7.5-325 MG per tablet Take 1-2 Tabs by mouth every four hours as needed.  0   • albuterol (VENTOLIN HFA) 108 (90 Base) MCG/ACT Aero Soln inhalation aerosol Inhale 2 Puffs by mouth every four hours as needed for Shortness of Breath.     • acetaminophen (TYLENOL) 500 MG Tab Take 500 mg by mouth every 6 hours as needed.     • Naproxen Sodium (ALEVE) 220 MG Cap Take 1 Cap by mouth every 3 hours as needed. alternating with ibuprofen       No current facility-administered medications on file prior to visit.        Patient has no known allergies.      ROS:   Review of Systems   Constitutional: Negative for chills, diaphoresis, fever, malaise/fatigue and weight loss.   HENT: Negative for congestion, ear discharge, ear pain, hearing loss, nosebleeds, sinus pain, sore throat and tinnitus.    Eyes: Negative for blurred vision, double vision, photophobia, pain, discharge and redness.   Respiratory: Positive for cough, sputum production and shortness of breath. Negative for hemoptysis, wheezing and stridor.    Cardiovascular: Positive for leg swelling. Negative for chest pain, palpitations, orthopnea, claudication and PND.   Gastrointestinal: Negative for abdominal pain, constipation, diarrhea, heartburn, nausea and vomiting.   Genitourinary: Negative for dysuria and urgency.   Musculoskeletal: Negative for back pain, falls, joint pain, myalgias and neck pain.   Skin: Negative for  "itching and rash.   Neurological: Negative for dizziness, tremors, speech change, focal weakness, weakness and headaches.   Endo/Heme/Allergies: Negative for environmental allergies.   Psychiatric/Behavioral: Negative for depression.       /62 (BP Location: Left arm, Patient Position: Sitting, BP Cuff Size: Adult)   Pulse (!) 101   Temp 36.6 °C (97.9 °F) (Temporal)   Resp 16   Ht 1.905 m (6' 3\")   Wt 86.2 kg (190 lb)   SpO2 92%   Physical Exam   Constitutional: He is oriented to person, place, and time. No distress.   Chronically ill appearing   HENT:   Head: Normocephalic and atraumatic.   Mouth/Throat: Oropharynx is clear and moist. No oropharyngeal exudate.   Eyes: Pupils are equal, round, and reactive to light. Conjunctivae and EOM are normal. No scleral icterus.   Neck: Normal range of motion. Neck supple. No tracheal deviation present.   Cardiovascular: Regular rhythm and normal heart sounds. Exam reveals no gallop and no friction rub.   No murmur heard.  tachy   Pulmonary/Chest: Effort normal. He has no wheezes. He has no rales.   Bronchial bs RUL, CTA on L   Abdominal: Soft. He exhibits no distension.   Musculoskeletal: Normal range of motion. He exhibits edema.   3+ pitting edema b/l LE to knees   Neurological: He is alert and oriented to person, place, and time. No cranial nerve deficit.   Skin: Skin is warm and dry. No rash noted.   Psychiatric: He has a normal mood and affect.       Imaging as reviewed by me personally:  As per HPI    Assessment:  1. Mass of right lung     2. Tobacco use     3. Cough  DME Other       Plan:  1. I do not have pathology or other study results obtained by oncology but advanced stage and at least by imaging from early July was rapidly expanding.  I did discuss at length with the pt his prognosis and options including palliative care should chemotherapy not prove to be slowing his disease in addition to side effects.  He does not need regular f/u with pulmonary per " se but I told him I am happy to see him on an as needed basis for any issues that arise.   2. Tobacco free since dx. Encouraged ongoing abstinence.  3. Secondary to above. I did write for flutter valve to hopefully help with cough.   Return if symptoms worsen or fail to improve.

## 2019-08-15 NOTE — TELEPHONE ENCOUNTER
[place], [place] does not offer flutter valves. I confirmed with [facility]den faust they do dispense flutter valves. I will faxed order to [facility]renden faust. Left pt vm informed order sent to [facility] faust

## 2019-09-13 NOTE — H&P
Hospital Medicine History & Physical Note    Date of Service  9/13/2019    Primary Care Physician  Elizabeth Mosquera M.D.    Consultants  None    Code Status  DNR/DNI discussed with the patient on admission    Chief Complaint  Shortness of breath, cough, chest pain    History of Presenting Illness  68 y.o. male with history of lung cancer with metastasis to the ribs, failing chemotherapy (progression of the cancer on CT of the chest), COPD, chronic hypoxia on 4 L, who presented 9/13/2019 with complaints of worsening shortness of breath with exertion, cough with small amount of white sputum, generalized weakness.  Patient was treated with oral Augmentin prescribed by oncologist on Tuesday.  Despite this, patient reports worsening of symptoms.  Patient's oncologist recommended to go patient to ER.  CAT scan of the chest showed interval progression of right-sided lung tumor.  Patient is following with Dr. Llanos, undergoing chemotherapy with new medication in the last 2 weeks, according to him.  He has not had a chance to talk to Dr. Llanos about failure of chemotherapy.  His son is presented at bedside and appears to be involved in his care.  I discussed with the patient overall guarded prognosis and sepsis regarding his cancer treatment progress.  We agreed with the plan to provide with IV antibiotic treatment for what appears to be postobstructive or healthcare associated pneumonia, and depending on clinical progress, is her discharge home to follow-up with oncology or discuss hospice.  I will order palliative care consult.  Patient agreed to be DNR/DNI in the hospital.    Review of Systems  Review of Systems   Constitutional: Positive for malaise/fatigue. Negative for chills, fever and weight loss.   HENT: Negative for ear pain, hearing loss and tinnitus.    Eyes: Negative for blurred vision, double vision and photophobia.   Respiratory: Positive for cough, sputum production and shortness of breath. Negative for  hemoptysis.    Cardiovascular: Positive for chest pain. Negative for palpitations and orthopnea.   Gastrointestinal: Negative for heartburn, nausea and vomiting.   Genitourinary: Negative for dysuria, flank pain, frequency and hematuria.   Musculoskeletal: Negative for back pain, joint pain and neck pain.   Skin: Negative for itching and rash.   Neurological: Negative for tremors, speech change, focal weakness and headaches.   Endo/Heme/Allergies: Negative for environmental allergies and polydipsia. Does not bruise/bleed easily.   Psychiatric/Behavioral: Negative for hallucinations and substance abuse. The patient is not nervous/anxious.        Past Medical History   has a past medical history of Breath shortness, Cancer (HCC) (2008), Lung cancer (HCC), Pain, and Sinus infection (03/29/2019).    Surgical History   has a past surgical history that includes trans urethral resection bladder (5/28/08); appendectomy; and tonsillectomy.     Family History  family history includes Lung Cancer in his father; No Known Problems in his mother.     Social History  he quit smoking about 5 months ago. His smoking use included cigarettes. He has a 50.00 pack-year smoking history. He has never used smokeless tobacco. He reports that he has current or past drug history. Drugs: Marijuana and Inhaled. He reports that he does not drink alcohol.    Allergies  No Known Allergies    Medications  Prior to Admission Medications   Prescriptions Last Dose Informant Patient Reported? Taking?   HYDROcodone-acetaminophen (NORCO) 7.5-325 MG per tablet 9/13/2019 at 0215 Patient Yes No   Sig: Take 1 Tab by mouth every four hours as needed.   albuterol (VENTOLIN HFA) 108 (90 Base) MCG/ACT Aero Soln inhalation aerosol PRN at PRN Patient Yes No   Sig: Inhale 2 Puffs by mouth every four hours as needed for Shortness of Breath.   amoxicillin-clavulanate (AUGMENTIN) 875-125 MG Tab 9/13/2019 at AM Patient Yes No   Sig: Take 1 Tab by mouth 2 Times a Day.  10 day course   furosemide (LASIX) 20 MG Tab PRN at PRN Patient Yes No   Sig: Take 20 mg by mouth 1 time daily as needed.   gabapentin (NEURONTIN) 300 MG Cap 9/13/2019 at AM Patient Yes No   Sig: Take 300 mg by mouth 3 times a day.   ipratropium-albuterol (DUONEB) 0.5-2.5 (3) MG/3ML nebulizer solution 9/13/2019 at PRN Patient Yes No   Sig: 3 mL by Nebulization route every four hours as needed.   morphine ER (MS CONTIN) 60 MG Tab CR tablet 9/13/2019 at 1000 Patient Yes Yes   Sig: Take 60 mg by mouth every 12 hours.      Facility-Administered Medications: None       Physical Exam  Temp:  [37.4 °C (99.3 °F)] 37.4 °C (99.3 °F)  Pulse:  [112-118] 112  Resp:  [16] 16  BP: (100-109)/(60-64) 109/62  SpO2:  [94 %-98 %] 97 %    Physical Exam   Constitutional: He is oriented to person, place, and time. He appears well-developed and well-nourished. He appears cachectic. He has a sickly appearance. He appears ill.   HENT:   Head: Normocephalic and atraumatic.   Eyes: Pupils are equal, round, and reactive to light. EOM are normal.   Neck: Normal range of motion. Neck supple.   Cardiovascular: Normal rate, regular rhythm and normal heart sounds.   Pulmonary/Chest: Effort normal. He has decreased breath sounds in the right upper field, the right middle field and the right lower field. He has rhonchi.   Abdominal: Soft. Bowel sounds are normal.   Musculoskeletal: Normal range of motion.   Neurological: He is alert and oriented to person, place, and time.   Skin: Skin is warm and dry.   Psychiatric: He has a normal mood and affect.   Nursing note and vitals reviewed.      Laboratory:  Recent Labs     09/13/19  1339   WBC 10.3   RBC 2.95*   HEMOGLOBIN 9.1*   HEMATOCRIT 29.0*   MCV 98.3*   MCH 30.8   MCHC 31.4*   RDW 55.1*   PLATELETCT 363   MPV 9.0     Recent Labs     09/13/19  1339   SODIUM 134*   POTASSIUM 3.9   CHLORIDE 95*   CO2 30   GLUCOSE 119*   BUN 8   CREATININE 0.59   CALCIUM 9.6     Recent Labs     09/13/19  1339   ALTSGPT  7   ASTSGOT 29   ALKPHOSPHAT 79   TBILIRUBIN 0.5   GLUCOSE 119*         No results for input(s): NTPROBNP in the last 72 hours.      No results for input(s): TROPONINT in the last 72 hours.    Urinalysis:    No results found     Imaging:  DX-CHEST-PORTABLE (1 VIEW)   Final Result      Large right lung mass consistent with history of lung cancer.            Assessment/Plan:  I anticipate this patient will require at least two midnights for appropriate medical management, necessitating inpatient admission.    Acute on chronic respiratory failure with hypoxia (HCC)  Assessment & Plan  Treatment as above for pneumonia  Incentive spirometry  Consider home O2 reevaluation as appropriate    Pneumonia  Assessment & Plan  This could be postobstructive pneumonia secondary to growing tumor, as well as healthcare associated pneumonia  He failed Augmentin as outpatient that was started 9/10  We started him on cefepime and metronidazole  Sputum culture ordered  Bronchodilators, RT protocol, Robitussin    Malignant neoplasm of overlapping sites of right lung (HCC)- (present on admission)  Assessment & Plan  Apparently, failure of second line chemotherapy based on CT scan of the chest  Consider discussion with oncology on call further options for treatment  Palliative care consult ordered  If patient is discharged home, follow with oncology as outpatient      VTE prophylaxis: Heparin

## 2019-09-13 NOTE — ED NOTES
Med Rec completed per patient   Allergies reviewed    Patient started a 10 day course of Augmentin on 9/10/19

## 2019-09-13 NOTE — FLOWSHEET NOTE
09/13/19 1618   Interdisciplinary Plan of Care-Goals (Indications)   Bronchodilator Indications Strong Subjective / Objective Improvement   Interdisciplinary Plan of Care-Outcomes    Bronchodilator Outcome Patient at Stable Baseline   Education   Education Yes - Pt. / Family has been Instructed in use of Respiratory Medications and Adverse Reactions   RT Assessment of Delivered Medications   Evaluation of Medication Delivery Daily Yes-- Pt /Family has been Instructed in use of Respiratory Medications and Adverse Reactions   SVN Group   #SVN Performed Yes   Given By: Mouthpiece   Date SVN Last Changed 09/13/19   Date SVN Next Change Due (Q 7 Days) 09/20/19   Respiratory WDL   Respiratory (WDL) X   Chest Exam   Respiration 16   Pulse (!) 111   Breath Sounds   Pre/Post Intervention Pre Intervention Assessment   RUL Breath Sounds Clear   RML Breath Sounds Clear   RLL Breath Sounds Diminished   JUSTICE Breath Sounds Clear   LLL Breath Sounds Diminished   Oximetry   Continuous Oximetry Yes   O2 Alarms Set & Reviewed Yes   Oxygen   Pulse Oximetry 97 %   O2 (LPM) 3   O2 Daily Delivery Respiratory  Nasal Cannula

## 2019-09-13 NOTE — ED TRIAGE NOTES
Ladarius Khan  68 y.o.  Chief Complaint   Patient presents with   • Shortness of Breath     on chemo for lung cancer, worsening SOB last couple days, productive cough   • Sent by MD     has been on abx for couple days, has gotten worse, cancer doctor told him he has pneumonia and he should go to the hospital     Patient wheeled in wc with friend to triage room with above complaint.  Patient appears in mild discomfort.  Patient is on O2 at all times, has recently been on 4L which is an increase.  Denies fevers at home.  Had blood work and an exam done at cancer doctor Tuesday and was started on abx for PNA.  Got better and then got worse.  Patient unknown if he has ever been neutropenic.    Charge notified of patient.  Patient escorted to the lobby and instructed to notify staff of any changes in condition.

## 2019-09-13 NOTE — ED NOTES
Assist RN: medicated for pain per his home regiment.   Family will take all home medications with him.   Primary RN aware that pt is asking for a breathing treatment.

## 2019-09-14 PROBLEM — J96.21 ACUTE ON CHRONIC RESPIRATORY FAILURE WITH HYPOXIA (HCC): Status: ACTIVE | Noted: 2019-01-01

## 2019-09-14 PROBLEM — J18.9 PNEUMONIA: Status: ACTIVE | Noted: 2019-01-01

## 2019-09-14 NOTE — RESPIRATORY CARE
COPD EDUCATION by COPD CLINICAL EDUCATOR  9/14/2019  at  11:15 AM by Elda Beal     Patient interviewed by COPD education team.  Patient unable to participate in full program.  Short intervention has been conducted.  A comprehensive packet including information about COPD, home treatments, airway clearance with aerobika, spacer instruct and oxygen safety.

## 2019-09-14 NOTE — CARE PLAN
Problem: Infection  Goal: Will remain free from infection  Outcome: NOT MET  Intervention: Assess signs and symptoms of infection  Note:   Pt started on Abx. Pt positive for PNA     Problem: Safety  Goal: Will remain free from falls  Outcome: PROGRESSING AS EXPECTED  Intervention: Implement fall precautions  Flowsheets  Taken 9/13/2019 2030  Environmental Precautions: Treaded Slipper Socks on Patient;Personal Belongings, Wastebasket, Call Bell etc. in Easy Reach;Transferred to Stronger Side;Report Given to Other Health Care Providers Regarding Fall Risk;Bed in Low Position;Communication Sign for Patients & Families;Mobility Assessed & Appropriate Sign Placed  Taken 9/14/2019 0106  Chair/Bed Strip Alarm: Yes - Alarm On

## 2019-09-14 NOTE — ASSESSMENT & PLAN NOTE
Apparently, failure of second line chemotherapy based on CT scan of the chest  Palliative care consult ordered  When discharged home, follow with Dr Llanos

## 2019-09-14 NOTE — CONSULTS
"Reason for PC Consult: Advance Care Planning    Consulted by:   Dr. Mcgraw    Assessment:  General:   68 year old male admitted for pneumonia on 9/13/19. Pt has a history of stage IV NSCLC with metastasis to the ribs, COPD, 4L home O2, chronic hypoxia. Pt's oncologist recommend pt to presented himself to the ED after pt experienced worsening shortness of breath after antibiotic therapy. Pt found to have pneumonia.     Dyspnea: Yes, 98% on 3L NC, chronic dyspnea  Last BM: 09/14/19    Pain: No   Depression: Mood appropriate for situation    Dementia: No       Spiritual:  Is Confucianism or spirituality important for coping with this illness? No, Pt declined visit from   Has a  or spiritual provider visit been requested? No    Palliative Performance Scale: 60%    Advance Directive: None on File    DPOA: None on File, Emergency contact Jonny Maddox ([phone])    POLST: None on File    Code Status: DNR/DNI      Outcome:  PC RN visited pt at bedside, introduced self and role of PC. Discussed pt's understanding of clinical picture, pt verbalized understanding of metastatic lung cancer and \"having a 3% chance\" of survival. Explored pt's thoughts and feelings about diagnosis, pt expressed willingness to continue with treatment and follow his oncologist's \"lead\". Pt doesn't think he will \"beat cancer but I willing to try\".     PC RN discussed hospice in detail, philosophy, goals and support, educated pt on how to obtain a hospice referral. Pt verbalized understanding, wrote information down in his notebook.     PC RN discussed code status, AD and POLST form. Pt states Jonny \"jared\" has a copy of his advanced directive and will call him later today to have him fax PC RN a copy. Discussed POLST form, pt confirmed he wishes to be DNAR/DNI, agreeable to completed POLST form. POLST completed, scanned a copy into EMR, original given back to pt per his request.     Provided pt with contact card, encouraged him to " call with any questions or concerns.     Active listening, education, validation, normalization, therapeutic touch, and emotional support provided throughout encounter.    Updated:   Dr. Ascencio    Plan:   GARRISON completed, continue GOC discussion, continue to support pt.     Thank you for allowing Palliative Care to participate in this patient's care. Please feel free to call [phone] with any questions or concerns.

## 2019-09-14 NOTE — CARE PLAN
Problem: Pain Management  Goal: Pain level will decrease to patient's comfort goal  Outcome: PROGRESSING AS EXPECTED   Pt tolerating current pain level. States it's a 3-4 and that is his goal. Medication scheduled and PRN.    Problem: Safety  Goal: Will remain free from falls  Outcome: PROGRESSING AS EXPECTED   No falls/injuries. Bed alarm in place. Pt using call light appropriately.

## 2019-09-14 NOTE — ASSESSMENT & PLAN NOTE
Treatment as above for pneumonia  Incentive spirometry  Consider home O2 reevaluation as appropriate

## 2019-09-14 NOTE — PROGRESS NOTES
"[facility] Medicine Daily Progress Note    Date of Service  9/14/2019    Chief Complaint  68 y.o. male admitted 9/13/2019 with shortness of breath.    Hospital Course    Patient with known lung cancer, presents with continued shortness of breath.  He was instructed by his oncologist to present to the ED for further evaluation as the augmentin he received earlier in the week was not helping him breathe any easier.  He had a re-staging CT chest done on 9/10 showing increased size of the right lung tumor - nearly obstructing the entire right lung.      Interval Problem Update  Patient states he is feeling better since admission, he continues to be short of breath but it is less severe.  He denies cough or weakness or the \"typical symptoms of pneumonia.\"      Consultants/Specialty  none    Code Status  DNR    Disposition  Home when appropriate.    Review of Systems  Review of Systems   Constitutional: Positive for malaise/fatigue. Negative for chills and fever.   HENT: Negative for congestion and sore throat.    Eyes: Negative for blurred vision and photophobia.   Respiratory: Positive for shortness of breath (better). Negative for cough and sputum production.    Cardiovascular: Negative for chest pain, claudication and leg swelling.   Gastrointestinal: Negative for abdominal pain, constipation, diarrhea, heartburn and vomiting.   Genitourinary: Negative for dysuria and hematuria.   Musculoskeletal: Negative for joint pain and myalgias.   Skin: Negative for itching and rash.   Neurological: Negative for dizziness, sensory change, speech change, weakness and headaches.   Psychiatric/Behavioral: Negative for depression. The patient is not nervous/anxious and does not have insomnia.         Physical Exam  Temp:  [36.9 °C (98.5 °F)-37.3 °C (99.2 °F)] 37.3 °C (99.2 °F)  Pulse:  [105-115] 106  Resp:  [16-18] 18  BP: (115-132)/(63-71) 116/63  SpO2:  [96 %-99 %] 98 %    Physical Exam   Constitutional: He is oriented to person, " place, and time. He appears well-developed and well-nourished. No distress.   HENT:   Head: Normocephalic and atraumatic.   Eyes: Conjunctivae are normal. No scleral icterus.   Neck: Neck supple. No JVD present.   Cardiovascular: Normal rate and regular rhythm. Exam reveals no gallop and no friction rub.   No murmur heard.  Pulmonary/Chest: Effort normal. No respiratory distress. He has no wheezes. He exhibits no tenderness.   Nearly absent breath sounds right lung fields     Abdominal: Soft. Bowel sounds are normal. He exhibits no distension and no mass. There is no tenderness.   Musculoskeletal: He exhibits no edema or tenderness.   Neurological: He is alert and oriented to person, place, and time. No cranial nerve deficit.   Skin: Skin is warm and dry. He is not diaphoretic. No erythema. No pallor.   Psychiatric: He has a normal mood and affect. His behavior is normal.   Nursing note and vitals reviewed.      Fluids    Intake/Output Summary (Last 24 hours) at 9/14/2019 1520  Last data filed at 9/13/2019 1609  Gross per 24 hour   Intake 100 ml   Output --   Net 100 ml       Laboratory  Recent Labs     09/13/19  1339 09/14/19  0243   WBC 10.3 9.2   RBC 2.95* 2.94*   HEMOGLOBIN 9.1* 8.7*   HEMATOCRIT 29.0* 28.8*   MCV 98.3* 98.0*   MCH 30.8 29.6   MCHC 31.4* 30.2*   RDW 55.1* 54.4*   PLATELETCT 363 326   MPV 9.0 9.0     Recent Labs     09/13/19  1339 09/14/19  0243   SODIUM 134* 133*   POTASSIUM 3.9 3.9   CHLORIDE 95* 96   CO2 30 28   GLUCOSE 119* 114*   BUN 8 8   CREATININE 0.59 0.52   CALCIUM 9.6 9.1                   Imaging  DX-CHEST-PORTABLE (1 VIEW)   Final Result      Large right lung mass consistent with history of lung cancer.           Assessment/Plan  Acute on chronic respiratory failure with hypoxia (HCC)  Assessment & Plan  Treatment as above for pneumonia  Incentive spirometry  Consider home O2 reevaluation as appropriate    Pneumonia  Assessment & Plan  This could be postobstructive pneumonia  secondary to growing tumor, as well as healthcare associated pneumonia  He failed Augmentin as outpatient that was started 9/10  We started him on cefepime and metronidazole  Sputum culture ordered  Bronchodilators, RT protocol, Robitussin    Malignant neoplasm of overlapping sites of right lung (HCC)- (present on admission)  Assessment & Plan  Apparently, failure of second line chemotherapy based on CT scan of the chest  Palliative care consult ordered  When discharged home, follow with Dr Llanos         VTE prophylaxis: heparin

## 2019-09-14 NOTE — PROGRESS NOTES
2 RN skin check complete w/ NATHAN Merino  Devices in place: O2 NC, Left pinky O2 monitoring, PIV, clothing, glasses  Skin assessed under devices: yes  Confirmed pressure ulcers found: none found   New potential pressure ulcers noted: none noted   Wound consult placed: n/a  The following interventions in place: Pt turning self, skin assessment qshift.

## 2019-09-14 NOTE — ASSESSMENT & PLAN NOTE
This could be postobstructive pneumonia secondary to growing tumor, as well as healthcare associated pneumonia  He failed Augmentin as outpatient that was started 9/10  Sputum culture showing klebsiella  Bronchodilators, RT protocol, Robitussin  Transitioned from cefepime to Augmentin 9/18

## 2019-09-14 NOTE — PALLIATIVE CARE
Palliative Care follow-up  Nurse AIde notified PC RN that pt has his AD with him.     Visited pt at bedside, pt provided AD. PC RN scanned AD into EMR, original given back to pt per his request.     Plan:   AD scanned into EMR      Thank you for allowing Palliative Care to participate in this patient's care. Please feel free to call [phone] with any questions or concerns.

## 2019-09-14 NOTE — PROGRESS NOTES
"/67   Pulse (!) 111   Temp 36.9 °C (98.5 °F) (Oral)   Resp 18   Ht 1.93 m (6' 4\")   Wt 87 kg (191 lb 12.8 oz)   SpO2 99%   BMI 23.35 kg/m²   Received report and assumed care of pt at 0700. Pt is A&O x4. Pt denies nausea, SOB, or chest pain. Mobility assessed and pt has a shuffle for a gait. Pt educated about bed alarms. PIV is patent and infusing. Bed is in lowest position and call light is within reach. Hourly rounding is in place.  "

## 2019-09-14 NOTE — CARE PLAN
Problem: Communication  Goal: The ability to communicate needs accurately and effectively will improve  Outcome: PROGRESSING AS EXPECTED   Pt able to communicate needs appropriately. Pt able to use call light, ask for pain meds, etc.    Problem: Safety  Goal: Will remain free from injury  Outcome: PROGRESSING AS EXPECTED   No falls/injuries. Bed alarm in place. Pt using call light appropriately.

## 2019-09-15 PROBLEM — R00.0 TACHYCARDIA: Status: ACTIVE | Noted: 2019-01-01

## 2019-09-15 PROBLEM — R63.0 POOR APPETITE: Status: ACTIVE | Noted: 2019-01-01

## 2019-09-15 NOTE — PROGRESS NOTES
"/60   Pulse (!) 114   Temp 36.8 °C (98.2 °F) (Oral)   Resp 18   Ht 1.93 m (6' 4\")   Wt 85 kg (187 lb 6.3 oz)   SpO2 98%   BMI 22.81 kg/m²   Received report and assumed care of pt at 1900. Pt is A&O x4. Pt denies nausea or chest pain. Pt is on 4L of O2. Pt educated on fall precautions. PIV is patent and infusing. Bed is in lowest position and call light is within reach. Hourly rounding is in place.  " Date of Service: 04/24/2018    REFERRING PHYSICIAN:  Lino Solorio MD.    HISTORY OF PRESENT ILLNESS:  The patient is a very pleasant 65-year-old female seen in the past for cervicalgia with cervical radicular pain.  Her cervical radicular symptoms have returned to some extent.  She has pain in the posterior neck area with radiation bilaterally to the shoulders and arms.  This has responded well to our cervical epidural steroid injections in the past.    The patient also has significant low back pain with bilateral radicular pain in the posterior and posterolateral aspect of the legs.  It continues down the thighs to the knees and occasionally past the knee toward the ankle.  The pain is exacerbated with activities such as bending, twisting or ambulating.  It decreases with rest.    MEDICATIONS, ALLERGIES, PAST MEDICAL HISTORY AND SOCIAL HISTORY:  Reviewed.  No other significant changes.    PHYSICAL EXAMINATION:  Significant for discomfort with sitting or standing.  Straight leg raising is negative.  Deep tendon reflexes remain intact.  They are diminished but symmetric in both the upper and lower extremities.  Motor function is grossly intact.    ASSESSMENT:  1.  Cervicalgia.  2.  Cervical radicular pain.  3.  Low back pain with radicular pain.    PLAN:  We will proceed with cervical epidural steroid injections today.  The procedure was discussed and reviewed.  She will return in 2-3 weeks at which time we will consider transforaminal injections in the lower lumbar area.  The patient understands and agrees.      Dictated By: Wilberto Oscar MD  Signing Provider: MD DALLAS Michael/naga ([id number])  DD: 04/24/2018 08:19:32 TD: 04/24/2018 08:30:11    Copy Sent To:

## 2019-09-15 NOTE — CARE PLAN
Problem: Safety  Goal: Will remain free from falls  Outcome: PROGRESSING AS EXPECTED  Intervention: Implement fall precautions  Flowsheets  Taken 9/15/2019 0114  Bed Alarm: Yes - Alarm On  Taken 9/14/2019 2130  Environmental Precautions: Treaded Slipper Socks on Patient;Transferred to Stronger Side;Personal Belongings, Wastebasket, Call Bell etc. in Easy Reach;Report Given to Other Health Care Providers Regarding Fall Risk;Bed in Low Position;Communication Sign for Patients & Families;Mobility Assessed & Appropriate Sign Placed     Problem: Infection  Goal: Will remain free from infection  Outcome: PROGRESSING SLOWER THAN EXPECTED  Intervention: Assess signs and symptoms of infection  Note:   Vital signs q 8 hrs. Pt is afebrile

## 2019-09-15 NOTE — PROGRESS NOTES
"[facility] Medicine Daily Progress Note    Date of Service  9/15/2019    Chief Complaint  68 y.o. male admitted 9/13/2019 with shortness of breath.    Hospital Course    Patient with known lung cancer, presents with continued shortness of breath.  He was instructed by his oncologist to present to the ED for further evaluation as the augmentin he received earlier in the week was not helping him breathe any easier.  He had a re-staging CT chest done on 9/10 showing increased size of the right lung tumor - nearly obstructing the entire right lung.      Interval Problem Update  9/14 Patient states he is feeling better since admission, he continues to be short of breath but it is less severe.  He denies cough or weakness or the \"typical symptoms of pneumonia.\"    9/15 Patient states he felt better yesterday and this am as he received his breathing treatments but when it was held due to his tachycardia, he started to feel slightly worse.  I've ordered xopenex to see if this affects his HR with treatments any differently, I anticipate he will continue to have persistent tachycardia from his severe lung obstruction from the cancer.  He also states his appetite is poor and would like to try an appetite stimulant.      Consultants/Specialty  none    Code Status  DNR    Disposition  Home when appropriate.    Review of Systems  Review of Systems   Constitutional: Positive for malaise/fatigue. Negative for chills and fever.   HENT: Negative for congestion and sore throat.    Eyes: Negative for blurred vision and photophobia.   Respiratory: Positive for shortness of breath (better). Negative for cough and sputum production.    Cardiovascular: Negative for chest pain, claudication and leg swelling.   Gastrointestinal: Negative for abdominal pain, constipation, diarrhea, heartburn and vomiting.   Genitourinary: Negative for dysuria and hematuria.   Musculoskeletal: Negative for joint pain and myalgias.   Skin: Negative for itching " and rash.   Neurological: Negative for dizziness, sensory change, speech change, weakness and headaches.   Psychiatric/Behavioral: Negative for depression. The patient is not nervous/anxious and does not have insomnia.         Physical Exam  Temp:  [36.4 °C (97.6 °F)-37.3 °C (99.2 °F)] 36.9 °C (98.4 °F)  Pulse:  [108-120] 110  Resp:  [18-20] 20  BP: (111-129)/(60-75) 111/61  SpO2:  [94 %-98 %] 95 %    Physical Exam   Constitutional: He is oriented to person, place, and time. He appears well-developed and well-nourished. No distress.   HENT:   Head: Normocephalic and atraumatic.   Eyes: Conjunctivae are normal. No scleral icterus.   Neck: Neck supple. No JVD present.   Cardiovascular: Normal rate and regular rhythm. Exam reveals no gallop and no friction rub.   No murmur heard.  Pulmonary/Chest: Effort normal. No respiratory distress. He has no wheezes. He exhibits no tenderness.   Nearly absent breath sounds right lung fields     Abdominal: Soft. Bowel sounds are normal. He exhibits no distension and no mass. There is no tenderness.   Musculoskeletal: He exhibits no edema or tenderness.   Neurological: He is alert and oriented to person, place, and time. No cranial nerve deficit.   Skin: Skin is warm and dry. He is not diaphoretic. No erythema. No pallor.   Psychiatric: He has a normal mood and affect. His behavior is normal.   Nursing note and vitals reviewed.      Fluids    Intake/Output Summary (Last 24 hours) at 9/15/2019 1359  Last data filed at 9/15/2019 1000  Gross per 24 hour   Intake 240 ml   Output --   Net 240 ml       Laboratory  Recent Labs     09/13/19  1339 09/14/19  0243 09/15/19  0227   WBC 10.3 9.2 9.9   RBC 2.95* 2.94* 2.95*   HEMOGLOBIN 9.1* 8.7* 8.8*   HEMATOCRIT 29.0* 28.8* 28.8*   MCV 98.3* 98.0* 97.6   MCH 30.8 29.6 29.8   MCHC 31.4* 30.2* 30.6*   RDW 55.1* 54.4* 53.8*   PLATELETCT 363 326 362   MPV 9.0 9.0 9.2     Recent Labs     09/13/19  1339 09/14/19  0243 09/15/19  0227   SODIUM 134*  133* 131*   POTASSIUM 3.9 3.9 4.0   CHLORIDE 95* 96 96   CO2 30 28 28   GLUCOSE 119* 114* 116*   BUN 8 8 8   CREATININE 0.59 0.52 0.52   CALCIUM 9.6 9.1 9.4                   Imaging  DX-CHEST-PORTABLE (1 VIEW)   Final Result      Large right lung mass consistent with history of lung cancer.           Assessment/Plan  Tachycardia  Assessment & Plan  Secondary to obstruction of most of right lung  Trial of xopenex for breathing treatments to see if any change in HR.      Poor appetite  Assessment & Plan  marinol trial      Acute on chronic respiratory failure with hypoxia (HCC)  Assessment & Plan  Treatment as above for pneumonia  Incentive spirometry  Consider home O2 reevaluation as appropriate    Pneumonia  Assessment & Plan  This could be postobstructive pneumonia secondary to growing tumor, as well as healthcare associated pneumonia  He failed Augmentin as outpatient that was started 9/10  cefepime and metronidazole  Sputum culture ordered  Bronchodilators, RT protocol, Robitussin    Malignant neoplasm of overlapping sites of right lung (HCC)- (present on admission)  Assessment & Plan  Apparently, failure of second line chemotherapy based on CT scan of the chest  Palliative care consult ordered  When discharged home, follow with Dr Llanos       VTE prophylaxis: heparin

## 2019-09-15 NOTE — CARE PLAN
Problem: Pain Management  Goal: Pain level will decrease to patient's comfort goal  Outcome: PROGRESSING AS EXPECTED  Intervention: Educate and implement non-pharmacologic comfort measures. Examples: relaxation, distration, play therapy, activity therapy, massage, etc.  Flowsheets (Taken 9/14/2019 2145 by Shiv Travis R.N.)  Intervention: Medication (see MAR)     Problem: Respiratory:  Goal: Respiratory status will improve  Outcome: PROGRESSING SLOWER THAN EXPECTED     Problem: Communication  Goal: The ability to communicate needs accurately and effectively will improve  Outcome: PROGRESSING AS EXPECTED     Problem: Safety  Goal: Will remain free from injury  Outcome: PROGRESSING AS EXPECTED  Goal: Will remain free from falls  Outcome: PROGRESSING AS EXPECTED

## 2019-09-15 NOTE — ASSESSMENT & PLAN NOTE
Secondary to obstruction of most of right lung  Trial of xopenex for breathing treatments to see if any change in HR.

## 2019-09-15 NOTE — CARE PLAN
Problem: Bronchoconstriction:  Goal: Improve in air movement and diminished wheezing  Intervention: Implement inhaled treatments  Note:   Duo QID     Problem: Hyperinflation:  Goal: Prevent or improve atelectasis  Intervention: Perform hyperinflation therapy as indicated by assessment  Note:   PEP IS  1780  2130  2091  750 actual

## 2019-09-16 NOTE — DIETARY
"Nutrition services: Day 3 of admit.  Ladarius Khan is a 68 y.o. male with admitting DX of shortness of breath, tachycardia, poor appetite, pneumonia  History includes: lung cancer  Patient seen for weight loss prior to admission.  Patient stated he lost approximately 40# in the past five months. He stated food doesn't taste good. Patient stated he wanted help getting enough nutrition to get strength back.  Snacks and supplements added.    Assessment:  Height: 193 cm (6' 4\")  Weight: 85 kg (187 lb 6.3 oz)  Body mass index is 22.81 kg/m². Normal weight  Diet/Intake: Regular diet.  Boost supplements ordered. He's been eating % of recent meals    Evaluation:   1. Marinol ordered to aid in appetite  2. 18% weight loss in less than six months  3. Moderate muscle and fat losses noted.      Malnutrition Risk: Moderate chronic disease related malnutrition as evidenced by severe weight loss and moderate fat and muscle wasting.    Recommendations/Inteventions/Plan:  1. Snacks and supplements in place   2. Encourage intake of meals, snacks and supplements  3. Document intake of all PO as % taken in ADL's to provide interdisciplinary communication across all shifts.   4. Monitor weight.  5. Nutrition rep will continue to see patient for ongoing meal and snack preferences.     RD following.      "

## 2019-09-16 NOTE — THERAPY
"Occupational Therapy Evaluation completed.   Functional Status:  SPV ADLs and ADL transfers, SPV functional mobility of household spaces without AD  Plan of Care: Patient with no further skilled OT needs in the acute care setting at this time  Discharge Recommendations:  Equipment: No Equipment Needed. Post-acute therapy Anticipate that the patient will have no further  occupational therapy needs after discharge from the hospital.     See \"Rehab Therapy-Acute\" Patient Summary Report for complete documentation.    Pt is 67yo male admitted with SOB, pmhx includes known lung CA, recent CT shows increased size of R lung tumor. Pt reports he has a good friend who assists with transportation to appointments and IADLs however pt is independent for basic ADLs. Pt has all DME installed in the home for increased safety including shower chair and grab bars. Recommend pt continue to engage in OOB activity with [facility] staff while admitted. No additional acute OT needs at this time.   "

## 2019-09-16 NOTE — PROGRESS NOTES
"/64   Pulse (!) 119   Temp 37 °C (98.6 °F) (Oral)   Resp 16   Ht 1.93 m (6' 4\")   Wt 85 kg (187 lb 6.3 oz)   SpO2 93%   BMI 22.81 kg/m²   Received report and assumed care of pt at 1900. Pt is A&O x4. Mobility assessed and pt has a steady gait. Fall precautions are in place. PIV is patent and infusing. Bed is in lowest position and call light is within reach. Hourly rounding is in place.  "

## 2019-09-16 NOTE — DOCUMENTATION QUERY
[facility]                                                                       Query Response Note      PATIENT:               NUNU BLUE  ACCT #:                  [id number]  MRN:                     [id number]  :                      1950  ADMIT DATE:       2019 12:53 PM  DISCH DATE:          RESPONDING  PROVIDER #:        [id number]           QUERY TEXT:    Pneumonia is documented in the Medical Record. Please specify the type of pneumonia and/or causative organism (includes probable or suspected)     NOTE:  If an appropriate response is not listed below, please respond with a new note.    The patient's Clinical Indicators include:  H&P: Lung cancer failing chemotherapy. Pneumonia could be postobstructive secondary to growing tumor, as well as healthcare associated pneumonia.   CXR: Right lung mass, trace amount of right pleural fluid. No pneumothorax.    Sputum Culture: Positive - Lactose fermenting gram negative hope, upper respiratory sonia including yeast.   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

## 2019-09-16 NOTE — CARE PLAN
Problem: Safety  Goal: Will remain free from falls  9/16/2019 0228 by Shiv Travis R.N.  Outcome: PROGRESSING AS EXPECTED  9/15/2019 2349 by Shiv Travis R.N.  Outcome: PROGRESSING AS EXPECTED  Intervention: Implement fall precautions  9/16/2019 0228 by Shiv Travis R.N.  Flowsheets  Taken 9/15/2019 1950  Environmental Precautions: Treaded Slipper Socks on Patient;Personal Belongings, Wastebasket, Call Bell etc. in Easy Reach;Transferred to Stronger Side;Report Given to Other Health Care Providers Regarding Fall Risk;Bed in Low Position;Communication Sign for Patients & Families;Mobility Assessed & Appropriate Sign Placed  Taken 9/16/2019 0228  Chair/Bed Strip Alarm: Yes - Alarm On  9/15/2019 2349 by Shiv Travis R.N.  Flowsheets  Taken 9/15/2019 1950  Environmental Precautions: Treaded Slipper Socks on Patient;Personal Belongings, Wastebasket, Call Bell etc. in Easy Reach;Transferred to Stronger Side;Report Given to Other Health Care Providers Regarding Fall Risk;Bed in Low Position;Communication Sign for Patients & Families;Mobility Assessed & Appropriate Sign Placed  Taken 9/15/2019 2349  Chair/Bed Strip Alarm: Yes - Alarm On     Problem: Infection  Goal: Will remain free from infection  Outcome: PROGRESSING AS EXPECTED  Intervention: Assess signs and symptoms of infection  Note:   Pt is on Abx. Pt is afebrile. Vital signs q 8 hours

## 2019-09-16 NOTE — PROGRESS NOTES
"[facility] Medicine Daily Progress Note    Date of Service  9/16/2019    Chief Complaint  68 y.o. male admitted 9/13/2019 with shortness of breath.    Hospital Course    Patient with known lung cancer, presents with continued shortness of breath.  He was instructed by his oncologist to present to the ED for further evaluation as the augmentin he received earlier in the week was not helping him breathe any easier.  He had a re-staging CT chest done on 9/10 showing increased size of the right lung tumor - nearly obstructing the entire right lung.      Interval Problem Update  9/14 Patient states he is feeling better since admission, he continues to be short of breath but it is less severe.  He denies cough or weakness or the \"typical symptoms of pneumonia.\"    9/15 Patient states he felt better yesterday and this am as he received his breathing treatments but when it was held due to his tachycardia, he started to feel slightly worse.  I've ordered xopenex to see if this affects his HR with treatments any differently, I anticipate he will continue to have persistent tachycardia from his severe lung obstruction from the cancer.  He also states his appetite is poor and would like to try an appetite stimulant.    9/16 Patient feeling better today with breathing treatments.  Sputum culture growing LFGNR - continue current antibiotics as patient symptoms improving.  He requests the dose of gabapentin be increased to 2 tablets at each dose.  He thinks marinol may have helped his appetite and wants to continue this for now.    Consultants/Specialty  none    Code Status  DNR    Disposition  Home when appropriate.    Review of Systems  Review of Systems   Constitutional: Positive for malaise/fatigue. Negative for chills and fever.   HENT: Negative for congestion and sore throat.    Eyes: Negative for blurred vision and photophobia.   Respiratory: Positive for shortness of breath (better). Negative for cough and sputum production. "    Cardiovascular: Negative for chest pain, claudication and leg swelling.   Gastrointestinal: Negative for abdominal pain, constipation, diarrhea, heartburn and vomiting.   Genitourinary: Negative for dysuria and hematuria.   Musculoskeletal: Negative for joint pain and myalgias.   Skin: Negative for itching and rash.   Neurological: Negative for dizziness, sensory change, speech change, weakness and headaches.   Psychiatric/Behavioral: Negative for depression. The patient is not nervous/anxious and does not have insomnia.         Physical Exam  Temp:  [37 °C (98.6 °F)] 37 °C (98.6 °F)  Pulse:  [107-130] 107  Resp:  [16-18] 18  BP: (116)/(64-67) 116/67  SpO2:  [93 %-97 %] 97 %    Physical Exam   Constitutional: He is oriented to person, place, and time. He appears well-developed and well-nourished. No distress.   HENT:   Head: Normocephalic and atraumatic.   Eyes: Conjunctivae are normal. No scleral icterus.   Neck: Neck supple. No JVD present.   Cardiovascular: Normal rate and regular rhythm. Exam reveals no gallop and no friction rub.   No murmur heard.  Pulmonary/Chest: Effort normal. No respiratory distress. He has no wheezes. He exhibits no tenderness.   Nearly absent breath sounds right lung fields     Abdominal: Soft. Bowel sounds are normal. He exhibits no distension and no mass. There is no tenderness.   Musculoskeletal: He exhibits no edema or tenderness.   Neurological: He is alert and oriented to person, place, and time. No cranial nerve deficit.   Skin: Skin is warm and dry. He is not diaphoretic. No erythema. No pallor.   Psychiatric: He has a normal mood and affect. His behavior is normal.   Nursing note and vitals reviewed.      Fluids    Intake/Output Summary (Last 24 hours) at 9/16/2019 1429  Last data filed at 9/16/2019 1000  Gross per 24 hour   Intake 360 ml   Output --   Net 360 ml       Laboratory  Recent Labs     09/14/19  0243 09/15/19  0227 09/16/19  0034   WBC 9.2 9.9 11.1*   RBC 2.94*  2.95* 2.99*   HEMOGLOBIN 8.7* 8.8* 9.2*   HEMATOCRIT 28.8* 28.8* 29.2*   MCV 98.0* 97.6 97.7   MCH 29.6 29.8 30.8   MCHC 30.2* 30.6* 31.5*   RDW 54.4* 53.8* 53.1*   PLATELETCT 326 362 331   MPV 9.0 9.2 8.9*     Recent Labs     09/14/19  0243 09/15/19  0227 09/16/19  0034   SODIUM 133* 131* 132*   POTASSIUM 3.9 4.0 4.0   CHLORIDE 96 96 94*   CO2 28 28 30   GLUCOSE 114* 116* 131*   BUN 8 8 9   CREATININE 0.52 0.52 0.48*   CALCIUM 9.1 9.4 9.4                   Imaging  DX-CHEST-PORTABLE (1 VIEW)   Final Result      Large right lung mass consistent with history of lung cancer.           Assessment/Plan  Tachycardia  Assessment & Plan  Secondary to obstruction of most of right lung  Trial of xopenex for breathing treatments to see if any change in HR.      Poor appetite  Assessment & Plan  marinol trial - might be helping      Acute on chronic respiratory failure with hypoxia (HCC)  Assessment & Plan  Treatment as above for pneumonia  Incentive spirometry  Consider home O2 reevaluation as appropriate    Pneumonia  Assessment & Plan  This could be postobstructive pneumonia secondary to growing tumor, as well as healthcare associated pneumonia  He failed Augmentin as outpatient that was started 9/10  cefepime and metronidazole  Sputum culture showing LFGNR  Bronchodilators, RT protocol, Robitussin    Malignant neoplasm of overlapping sites of right lung (HCC)- (present on admission)  Assessment & Plan  Apparently, failure of second line chemotherapy based on CT scan of the chest  Palliative care consult ordered  When discharged home, follow with Dr Llanos       VTE prophylaxis: heparin

## 2019-09-16 NOTE — CARE PLAN
Problem: Pain Management  Goal: Pain level will decrease to patient's comfort goal  Outcome: PROGRESSING AS EXPECTED  Intervention: Educate and implement non-pharmacologic comfort measures. Examples: relaxation, distration, play therapy, activity therapy, massage, etc.  Flowsheets (Taken 9/16/2019 0803)  Intervention: Medication (see MAR)     Problem: Communication  Goal: The ability to communicate needs accurately and effectively will improve  Outcome: PROGRESSING AS EXPECTED  Intervention: Educate patient and significant other/support system about the plan of care, procedures, treatments, medications and allow for questions  Flowsheets (Taken 9/16/2019 0858)  Pt & Family Have Been Educated on Methods Available to Report Concerns Related to Care, Treatment, Services, and Patient Safety Issues: Yes     Problem: Safety  Goal: Will remain free from injury  Outcome: PROGRESSING AS EXPECTED  Goal: Will remain free from falls  Outcome: PROGRESSING AS EXPECTED

## 2019-09-17 NOTE — CARE PLAN
Problem: Pain Management  Goal: Pain level will decrease to patient's comfort goal  Outcome: PROGRESSING AS EXPECTED  Note:   Patient educated on 0-10 pain rating scale; scheduled and PRN pain medications provided to patient.     Problem: Communication  Goal: The ability to communicate needs accurately and effectively will improve  Outcome: PROGRESSING AS EXPECTED  Note:   Plan of care discussed with patient, all questions answered.

## 2019-09-17 NOTE — CARE PLAN
Problem: Bronchoconstriction:  Goal: Improve in air movement and diminished wheezing  Intervention: Implement inhaled treatments  Note:   Duo QID

## 2019-09-17 NOTE — PROGRESS NOTES
Received report & assumed care of patient at 1900. Assessment completed. Patient is A&Ox4, up SBA with steady gait. R PIV patent, SL. Patient reports pain of 8/10, medication provided per MAR. Patient is on 4L oxygen via nasal cannula,  in use. POC discussed & questions answered. Bed locked and in lowest position, bed alarm is on, call light within reach, non-skid socks in place, hourly rounding. Patient reports no further needs at this time.

## 2019-09-17 NOTE — CARE PLAN
Problem: Respiratory:  Goal: Respiratory status will improve  Outcome: PROGRESSING AS EXPECTED  Intervention: Administer and titrate oxygen therapy  Flowsheets (Taken 9/17/2019 1132 by Berny Erwin, ERICKSON)  O2 (LPM): 3  Note:   Pt was on 4 L at beginning of shift; pt states he normally wears 3 L at home. Turned down o2 to 3 L, pt satting upper 90's on 3L.  Intervention: Educate and encourage incentive spirometry usage  Note:   IS at bedside. Pt able to pull 1250 demonstrating correct use. Encouraged pt to continue to use 10x/hr while awake.      Problem: Safety  Goal: Will remain free from falls  Outcome: PROGRESSING AS EXPECTED  Intervention: Implement fall precautions  Note:   Fall precautions in place including strip bed alarm. Pt calls appropriately for assistance.

## 2019-09-17 NOTE — PROGRESS NOTES
"Hospital Medicine Daily Progress Note    Date of Service  9/17/2019    Chief Complaint  68 y.o. male admitted 9/13/2019 with shortness of breath.    Hospital Course    Patient with known lung cancer, presents with continued shortness of breath.  He was instructed by his oncologist to present to the ED for further evaluation as the augmentin he received earlier in the week was not helping him breathe any easier.  He had a re-staging CT chest done on 9/10 showing increased size of the right lung tumor - nearly obstructing the entire right lung.      Interval Problem Update  9/14 Patient states he is feeling better since admission, he continues to be short of breath but it is less severe.  He denies cough or weakness or the \"typical symptoms of pneumonia.\"    9/15 Patient states he felt better yesterday and this am as he received his breathing treatments but when it was held due to his tachycardia, he started to feel slightly worse.  I've ordered xopenex to see if this affects his HR with treatments any differently, I anticipate he will continue to have persistent tachycardia from his severe lung obstruction from the cancer.  He also states his appetite is poor and would like to try an appetite stimulant.    9/16 Patient feeling better today with breathing treatments.  Sputum culture growing LFGNR - continue current antibiotics as patient symptoms improving.  He requests the dose of gabapentin be increased to 2 tablets at each dose.  He thinks marinol may have helped his appetite and wants to continue this for now  9/17: Patient seen and examined by me today.  States that he overall feels much better.  He still has some exertional shortness of breath.  Sputum cultures came back for Klebsiella and have discontinued his Flagyl.  I will continue cefepime for 1 more day and anticipate addition to oral tomorrow.  Anticipate discharge tomorrow.  Consultants/Specialty  none    Code Status  DNR    Disposition  Home when " appropriate.    Review of Systems  Review of Systems   Constitutional: Positive for malaise/fatigue. Negative for chills and fever.   HENT: Negative for congestion and sore throat.    Eyes: Negative for blurred vision and photophobia.   Respiratory: Positive for shortness of breath (better). Negative for cough and sputum production.    Cardiovascular: Negative for chest pain, claudication and leg swelling.   Gastrointestinal: Negative for abdominal pain, constipation, diarrhea, heartburn and vomiting.   Genitourinary: Negative for dysuria and hematuria.   Musculoskeletal: Negative for joint pain and myalgias.   Skin: Negative for itching and rash.   Neurological: Negative for dizziness, sensory change, speech change, weakness and headaches.   Psychiatric/Behavioral: Negative for depression. The patient is not nervous/anxious and does not have insomnia.         Physical Exam  Temp:  [36.6 °C (97.8 °F)-37.2 °C (99 °F)] 36.9 °C (98.5 °F)  Pulse:  [107-119] 109  Resp:  [16-18] 18  BP: (105-124)/(55-91) 124/91  SpO2:  [94 %-99 %] 94 %    Physical Exam   Constitutional: He is oriented to person, place, and time. He appears well-developed and well-nourished. No distress.   HENT:   Head: Normocephalic and atraumatic.   Eyes: Conjunctivae are normal. No scleral icterus.   Neck: Neck supple. No JVD present.   Cardiovascular: Normal rate and regular rhythm. Exam reveals no gallop and no friction rub.   No murmur heard.  Pulmonary/Chest: Effort normal. No respiratory distress. He has no wheezes. He exhibits no tenderness.   Nearly absent breath sounds right lung fields     Abdominal: Soft. Bowel sounds are normal. He exhibits no distension and no mass. There is no tenderness.   Musculoskeletal: He exhibits no edema or tenderness.   Neurological: He is alert and oriented to person, place, and time. No cranial nerve deficit.   Skin: Skin is warm and dry. He is not diaphoretic. No erythema. No pallor.   Psychiatric: He has a  normal mood and affect. His behavior is normal.   Nursing note and vitals reviewed.      Fluids    Intake/Output Summary (Last 24 hours) at 9/17/2019 1516  Last data filed at 9/17/2019 1000  Gross per 24 hour   Intake 320 ml   Output --   Net 320 ml       Laboratory  Recent Labs     09/15/19  0227 09/16/19  0034 09/17/19  0044   WBC 9.9 11.1* 10.7   RBC 2.95* 2.99* 2.81*   HEMOGLOBIN 8.8* 9.2* 8.3*   HEMATOCRIT 28.8* 29.2* 27.5*   MCV 97.6 97.7 97.9*   MCH 29.8 30.8 29.5   MCHC 30.6* 31.5* 30.2*   RDW 53.8* 53.1* 53.0*   PLATELETCT 362 331 310   MPV 9.2 8.9* 8.9*     Recent Labs     09/15/19  0227 09/16/19  0034 09/17/19  0044   SODIUM 131* 132* 134*   POTASSIUM 4.0 4.0 3.8   CHLORIDE 96 94* 96   CO2 28 30 32   GLUCOSE 116* 131* 129*   BUN 8 9 10   CREATININE 0.52 0.48* 0.50   CALCIUM 9.4 9.4 9.0                   Imaging  DX-CHEST-PORTABLE (1 VIEW)   Final Result      Large right lung mass consistent with history of lung cancer.           Assessment/Plan  Tachycardia  Assessment & Plan  Secondary to obstruction of most of right lung  Trial of xopenex for breathing treatments to see if any change in HR.      Poor appetite  Assessment & Plan  marinol trial - might be helping      Acute on chronic respiratory failure with hypoxia (HCC)  Assessment & Plan  Treatment as above for pneumonia  Incentive spirometry  Consider home O2 reevaluation as appropriate    Pneumonia  Assessment & Plan  This could be postobstructive pneumonia secondary to growing tumor, as well as healthcare associated pneumonia  He failed Augmentin as outpatient that was started 9/10  Sputum culture showing klebsiella  Bronchodilators, RT protocol, Robitussin  Continue cefepime for another day and transfer to [facility] tomorrow    Malignant neoplasm of overlapping sites of right lung (HCC)- (present on admission)  Assessment & Plan  Apparently, failure of second line chemotherapy based on CT scan of the chest  Palliative care consult ordered  When  discharged home, follow with Dr Llanos       VTE prophylaxis: heparin

## 2019-09-17 NOTE — PROGRESS NOTES
"Received report from NOC, assumed care of pt 0700.  Pt is A&Ox4, up SBA.  Miralax administered, pt hasn't had a BM in \"a few days\".  PIV to RUE patent, no blood return noted, dressing CDI.  Reviewed POC with pt, he is in agreement. Pain control and receiving his marinol prior to meals are important to him. Assured him this RN will try to meet his goals.  Assessment complete, all needs met at this time.   "

## 2019-09-17 NOTE — CARE PLAN
Problem: Bronchoconstriction:  Goal: Improve in air movement and diminished wheezing  Intervention: Implement inhaled treatments  Note:   Duo QID     Problem: Bronchopulmonary Hygiene:  Goal: Increase mobilization of retained secretions  Intervention: Perform bronchopulmonary therapy as indicated by assessment  Note:   IPV meteneb      Problem: Hyperinflation:  Goal: Prevent or improve atelectasis  Intervention: Perform hyperinflation therapy as indicated by assessment  Note:   PEP IS   1100 of 2130

## 2019-09-17 NOTE — THERAPY
"Physical Therapy Evaluation completed.   Bed Mobility:  Supine to Sit: Supervised  Transfers: Sit to Stand: Supervised  Gait: Level Of Assist: Supervised with No Equipment Needed       Plan of Care: Patient with no further skilled PT needs in the acute care setting at this time  Discharge Recommendations: Equipment: No Equipment Needed. Post-acute therapy Currently anticipate no further skilled therapy needs once patient is discharged from the inpatient setting.    Pt admitted for pneumonia workup and presents very near his functional baseline. He completed x215' of gait without device but declined stairs as he reported he was more fatigued than normal. At this time, pt does not require further acute PT intervention and appears functionally capable of return home.     See \"Rehab Therapy-Acute\" Patient Summary Report for complete documentation.     "

## 2019-09-18 NOTE — DISCHARGE PLANNING
Care Transition Team Assessment      Spoke with the patient at the bedside. The patient states that his friend Jonny will give a ride home tomorrow. The patient is on 3L of O2 at baseline.      Information Source  Orientation : Oriented x 4  Information Given By: Patient         Elopement Risk  Legal Hold: No  Ambulatory or Self Mobile in Wheelchair: Yes  Disoriented: No  Psychiatric Symptoms: None  History of Wandering: No  Elopement this Admit: No  Vocalizing Wanting to Leave: No  Displays Behaviors, Body Language Wanting to Leave: No-Not at Risk for Elopement  Elopement Risk: Not at Risk for Elopement    Interdisciplinary Discharge Planning  Lives with - Patient's Self Care Capacity: Alone and Able to Care For Self  Patient or legal guardian wants to designate a caregiver (see row info): No  Support Systems: Friends / Neighbors  Housing / Facility: 1 Story House  Able to Return to Previous ADL's: Yes  Mobility Issues: No  Prior Services: None  Patient Expects to be Discharged to:: home  Assistance Needed: No  Durable Medical Equipment: Home Oxygen  DME Provider / Phone: Briana    Discharge Preparedness  What is your plan after discharge?: Home with help  What are your discharge supports?: Other (comment)(friends)  Prior Functional Level: Ambulatory, Drives Self, Independent with Activities of Daily Living, Independent with Medication Management    Functional Assesment  Prior Functional Level: Ambulatory, Drives Self, Independent with Activities of Daily Living, Independent with Medication Management    Finances  Financial Barriers to Discharge: No  Prescription Coverage: Yes    Vision / Hearing Impairment  Vision Impairment : Yes  Right Eye Vision: Impaired, Wears Glasses  Left Eye Vision: Impaired, Wears Glasses  Hearing Impairment : No         Advance Directive  Advance Directive?: POLST, DPOA for Health Care    Domestic Abuse  Have you ever been the victim of abuse or violence?: No  Physical Abuse or Sexual  Abuse: No  Verbal Abuse or Emotional Abuse: No  Possible Abuse Reported to:: Not Applicable         Discharge Risks or Barriers  Discharge risks or barriers?: No    Anticipated Discharge Information  Anticipated discharge disposition: Home  Discharge Address: [address] [place] [address]  Discharge Contact Phone Number: [phone]

## 2019-09-18 NOTE — PROGRESS NOTES
"[facility] Medicine Daily Progress Note    Date of Service  9/18/2019    Chief Complaint  68 y.o. male admitted 9/13/2019 with shortness of breath.    Hospital Course    Patient with known lung cancer, presents with continued shortness of breath.  He was instructed by his oncologist to present to the ED for further evaluation as the augmentin he received earlier in the week was not helping him breathe any easier.  He had a re-staging CT chest done on 9/10 showing increased size of the right lung tumor - nearly obstructing the entire right lung.      Interval Problem Update  9/14 Patient states he is feeling better since admission, he continues to be short of breath but it is less severe.  He denies cough or weakness or the \"typical symptoms of pneumonia.\"    9/15 Patient states he felt better yesterday and this am as he received his breathing treatments but when it was held due to his tachycardia, he started to feel slightly worse.  I've ordered xopenex to see if this affects his HR with treatments any differently, I anticipate he will continue to have persistent tachycardia from his severe lung obstruction from the cancer.  He also states his appetite is poor and would like to try an appetite stimulant.    9/16 Patient feeling better today with breathing treatments.  Sputum culture growing LFGNR - continue current antibiotics as patient symptoms improving.  He requests the dose of gabapentin be increased to 2 tablets at each dose.  He thinks marinol may have helped his appetite and wants to continue this for now  9/17: Patient seen and examined by me today.  States that he overall feels much better.  He still has some exertional shortness of breath.  Sputum cultures came back for Klebsiella and have discontinued his Flagyl.  I will continue cefepime for 1 more day and anticipate addition to oral tomorrow.  Anticipate discharge tomorrow.  9/18: Still feels short of breath and unsteady gait. Will give 1 more day for " treatment and discharge tomorrow.   Consultants/Specialty  none    Code Status  DNR    Disposition  Home when appropriate.    Review of Systems  Review of Systems   Constitutional: Positive for malaise/fatigue. Negative for chills and fever.   HENT: Negative for congestion and sore throat.    Eyes: Negative for blurred vision and photophobia.   Respiratory: Positive for shortness of breath (better). Negative for cough and sputum production.    Cardiovascular: Negative for chest pain, claudication and leg swelling.   Gastrointestinal: Negative for abdominal pain, constipation, diarrhea, heartburn and vomiting.   Genitourinary: Negative for dysuria and hematuria.   Musculoskeletal: Negative for joint pain and myalgias.   Skin: Negative for itching and rash.   Neurological: Negative for dizziness, sensory change, speech change, weakness and headaches.   Psychiatric/Behavioral: Negative for depression. The patient is not nervous/anxious and does not have insomnia.         Physical Exam  Temp:  [36.8 °C (98.2 °F)-37.1 °C (98.7 °F)] 37.1 °C (98.7 °F)  Pulse:  [109-124] 117  Resp:  [18-21] 18  BP: (107-114)/(60-64) 111/60  SpO2:  [94 %-98 %] 95 %    Physical Exam   Constitutional: He is oriented to person, place, and time. He appears well-developed and well-nourished. No distress.   HENT:   Head: Normocephalic and atraumatic.   Eyes: Conjunctivae are normal. No scleral icterus.   Neck: Neck supple. No JVD present.   Cardiovascular: Normal rate and regular rhythm. Exam reveals no gallop and no friction rub.   No murmur heard.  Pulmonary/Chest: Effort normal. No respiratory distress. He has no wheezes. He exhibits no tenderness.   Nearly absent breath sounds right lung fields     Abdominal: Soft. Bowel sounds are normal. He exhibits no distension and no mass. There is no tenderness.   Musculoskeletal: He exhibits no edema or tenderness.   Neurological: He is alert and oriented to person, place, and time. No cranial nerve  deficit.   Skin: Skin is warm and dry. He is not diaphoretic. No erythema. No pallor.   Psychiatric: He has a normal mood and affect. His behavior is normal.   Nursing note and vitals reviewed.      Fluids  No intake or output data in the 24 hours ending 09/18/19 1423    Laboratory  Recent Labs     09/16/19 0034 09/17/19  0044   WBC 11.1* 10.7   RBC 2.99* 2.81*   HEMOGLOBIN 9.2* 8.3*   HEMATOCRIT 29.2* 27.5*   MCV 97.7 97.9*   MCH 30.8 29.5   MCHC 31.5* 30.2*   RDW 53.1* 53.0*   PLATELETCT 331 310   MPV 8.9* 8.9*     Recent Labs     09/16/19 0034 09/17/19  0044   SODIUM 132* 134*   POTASSIUM 4.0 3.8   CHLORIDE 94* 96   CO2 30 32   GLUCOSE 131* 129*   BUN 9 10   CREATININE 0.48* 0.50   CALCIUM 9.4 9.0                   Imaging  DX-CHEST-PORTABLE (1 VIEW)   Final Result      Large right lung mass consistent with history of lung cancer.           Assessment/Plan  * Pneumonia  Assessment & Plan  This could be postobstructive pneumonia secondary to growing tumor, as well as healthcare associated pneumonia  He failed Augmentin as outpatient that was started 9/10  Sputum culture showing klebsiella  Bronchodilators, RT protocol, Robitussin  Transitioned from cefepime to Augmentin 9/18    Malignant neoplasm of overlapping sites of right lung (HCC)- (present on admission)  Assessment & Plan  Apparently, failure of second line chemotherapy based on CT scan of the chest  Palliative care consult ordered  When discharged home, follow with Dr Llanos    Tachycardia  Assessment & Plan  Secondary to obstruction of most of right lung  Trial of xopenex for breathing treatments to see if any change in HR.      Poor appetite  Assessment & Plan  marinol trial - might be helping      Acute on chronic respiratory failure with hypoxia (HCC)  Assessment & Plan  Treatment as above for pneumonia  Incentive spirometry  Consider home O2 reevaluation as appropriate       VTE prophylaxis: heparin

## 2019-09-18 NOTE — PROGRESS NOTES
Assumed patient care at 0700.  Patient is alert, awake, and oriented x 4, non labored breathing on 3L O2 via nasal cannula, but patient does endorse shortness of breath with exertion.  Patient does endorse right sided lateral chest and lower back pain--prn pain medications given with good effect.  Patient observed to have fair PO intake. Ambulating to the bathroom with CNA.  No difficulty voiding noted. Bed alarm in place. Bed in lowest position. Call light and belongings within reach. Will continue to monitor.

## 2019-09-18 NOTE — CARE PLAN
Problem: Pain Management  Goal: Pain level will decrease to patient's comfort goal  Outcome: PROGRESSING AS EXPECTED  Note:   Patient endorses right lateral chest pain and lower back pain--prn oxycodone given. Education provided about importance of keeping pain under control and side effects of pain medication including constipation, patient verbalized understanding.      Problem: Respiratory:  Goal: Respiratory status will improve  Outcome: PROGRESSING SLOWER THAN EXPECTED  Note:   Patient states he still feels short of breath especially with ambulation. Patient on 3L O2 via nasal cannula, which is patient's baseline.   Patient's IV antibiotic changed to PO Augmentin today. Will continue to monitor.

## 2019-09-18 NOTE — CARE PLAN
Problem: Pain Management  Goal: Pain level will decrease to patient's comfort goal  Outcome: PROGRESSING AS EXPECTED  Note:   Patient educated on 0-10 pain rating scale, PRN and scheduled pain medication provided to patient.     Problem: Safety  Goal: Will remain free from falls  Outcome: PROGRESSING AS EXPECTED  Note:   Call light within reach, non-skid socks in place, bed locked and in lowest position, bed alarm on, hourly rounding.

## 2019-09-18 NOTE — PROGRESS NOTES
Received report & assumed care of patient at 1900. Assessment completed. Patient is A&Ox4, up SBA. R PIV patent, SL. Patient reports pain of 8/10, medication provided per MAR. Patient is on 3L oxygen via nasal cannula,  in use. POC discussed & questions answered. Bed locked and in lowest position, bed alarm is on, call light within reach, non-skid socks in place, hourly rounding. Patient reports no further needs at this time.

## 2019-09-19 NOTE — CARE PLAN
Problem: Safety  Goal: Will remain free from injury  Outcome: PROGRESSING AS EXPECTED  Note:   Call light and belongings within reach, bed to lowest position and locked, strip alarm in place and activated and rounding in place.       Problem: Knowledge Deficit  Goal: Knowledge of disease process/condition, treatment plan, diagnostic tests, and medications will improve  Outcome: PROGRESSING AS EXPECTED  Note:   Pt educated regarding POC, medications and flow of night. All questions answered.

## 2019-09-19 NOTE — DISCHARGE SUMMARY
Discharge Summary    CHIEF COMPLAINT ON ADMISSION  Chief Complaint   Patient presents with   • Shortness of Breath     on chemo for lung cancer, worsening SOB last couple days, productive cough   • Sent by MD     has been on abx for couple days, has gotten worse, cancer doctor told him he has pneumonia and he should go to the hospital       Reason for Admission  sent by md     Admission Date  9/13/2019    CODE STATUS  DNAR/DNI    HPI & HOSPITAL COURSE  This is a 68 y.o. 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  Patient failed oral Augmentin as an outpatient.  Prior to arriving to the hospital he had a CT scan of the chest that found enlarging mass of the right upper lobe, enlarging [place] stasis to the adrenal glands, bronchitis, and pneumonia.  Upon arrival to emergency room his blood pressure was 100/60, pulse 118, temperature  99.3.  Lactic acid 1.8, WBCs 10.3, hemoglobin 9.1.  Chest x-ray found a large right lung mass.  Patient was started on cefepime and metronidazole.  Throughout his hospital stay his symptoms dramatically improved with inhalers, mucolytic's, cough suppressant, and antibiotics. Sputum cultures grew Klebsiella.  He was then de-escalated to cefepime only and then transition to oral Augmentin.      Therefore, he is discharged in good and stable condition to home with close outpatient follow-up.    The patient met 2-midnight criteria for an inpatient stay at the time of discharge.    Discharge Date  9/19/2019    FOLLOW UP ITEMS POST DISCHARGE  Follow up with oncology     DISCHARGE DIAGNOSES  Principal Problem:    Pneumonia POA: Unknown  Active Problems:    Malignant neoplasm of overlapping sites of right lung (HCC) POA: Yes       Overview: Following with oncology and pulmonology      Pain management: MS contin 45 mg bid, gabapentin 600 mg tid, norco prn,       acetaminophen prn      For sob: duoneb prn, albuterol prn     Acute on chronic respiratory failure with hypoxia (HCC) POA: Unknown    Poor appetite POA: Unknown    Tachycardia POA: Unknown  Resolved Problems:    * No resolved hospital problems. *      FOLLOW UP  Future Appointments   Date Time Provider Department Center   11/8/2019  1:00 PM [address]     No follow-up provider specified.    MEDICATIONS ON DISCHARGE     Medication List      START taking these medications      Instructions   dronabinol 5 MG Caps  Commonly known as:  MARINOL   Take 1 Cap by mouth 2 times a day for 30 days.  Dose:  5 mg     fluticasone 50 MCG/ACT nasal spray  Commonly known as:  FLONASE   Spray 1 Spray in nose every day.  Dose:  1 Spray     guaiFENesin  MG Tb12  Commonly known as:  MUCINEX   Take 1 Tab by mouth every 12 hours for 14 days.  Dose:  600 mg        CHANGE how you take these medications      Instructions   amoxicillin-clavulanate 875-125 MG Tabs  What changed:    · when to take this  · additional instructions  Commonly known as:  AUGMENTIN   Take 1 Tab by mouth every 12 hours for 2 days.  Dose:  1 Tab     furosemide 20 MG Tabs  What changed:    · when to take this  · reasons to take this  Commonly known as:  LASIX   Take 1 Tab by mouth every day.  Dose:  20 mg        CONTINUE taking these medications      Instructions   albuterol 108 (90 Base) MCG/ACT Aers inhalation aerosol   Inhale 2 Puffs by mouth every four hours as needed for Shortness of Breath.  Dose:  2 Puff     gabapentin 300 MG Caps  Commonly known as:  NEURONTIN   Take 300 mg by mouth 3 times a day.  Dose:  300 mg     HYDROcodone-acetaminophen 7.5-325 MG per tablet  Commonly known as:  NORCO   Take 1 Tab by mouth every four hours as needed.  Dose:  1 Tab     ipratropium-albuterol 0.5-2.5 (3) MG/3ML  nebulizer solution  Commonly known as:  DUONEB   3 mL by Nebulization route every four hours as needed.  Dose:  3 mL     morphine ER 60 MG Tbcr tablet  Commonly known as:  MS CONTIN   Take 60 mg by mouth every 12 hours.  Dose:  60 mg            Allergies  No Known Allergies    DIET  Orders Placed This Encounter   Procedures   • Diet Order Regular     Standing Status:   Standing     Number of Occurrences:   1     Order Specific Question:   Diet:     Answer:   Regular [1]       ACTIVITY  As tolerated.  Weight bearing as tolerated    CONSULTATIONS  None    PROCEDURES  None    LABORATORY  Lab Results   Component Value Date    SODIUM 134 (L) 09/17/2019    POTASSIUM 3.8 09/17/2019    CHLORIDE 96 09/17/2019    CO2 32 09/17/2019    GLUCOSE 129 (H) 09/17/2019    BUN 10 09/17/2019    CREATININE 0.50 09/17/2019        Lab Results   Component Value Date    WBC 10.7 09/17/2019    HEMOGLOBIN 8.3 (L) 09/17/2019    HEMATOCRIT 27.5 (L) 09/17/2019    PLATELETCT 310 09/17/2019        Total time of the discharge process exceeds 50 minutes.

## 2019-09-19 NOTE — PROGRESS NOTES
-Neuro: A&Ox4, pleasant  -VS: 3 L NC O2 baseline, continuous pulse ox. PRN albuterol inhaler given x1, effective  -Pain to back; scheduled MS contin and PRN oxycodone effective  -No complaints of nausea or diarrhea  -Skin: flaky, generalized edema  -No IV acces; MD aware  -Activity: SBA, BA on, calls appropriately, bed in lowest position with hourly rounding    Discharge today

## 2019-09-19 NOTE — DISCHARGE INSTRUCTIONS
Discharge Instructions    Discharged to home by car with friend. Discharged via wheelchair, hospital escort: Yes.  Special equipment needed: Oxygen    Be sure to schedule a follow-up appointment with your primary care doctor or any specialists as instructed.     Discharge Plan:   Diet Plan: Discussed  Activity Level: Discussed  Smoking Cessation Offered: Patient Counseled  Confirmed Follow up Appointment: Appointment Scheduled  Confirmed Symptoms Management: Discussed  Medication Reconciliation Updated: Yes  Influenza Vaccine Indication: Patient Refuses    I understand that a diet low in cholesterol, fat, and sodium is recommended for good health. Unless I have been given specific instructions below for another diet, I accept this instruction as my diet prescription.   Other diet: Regular    Special Instructions: None    · Is patient discharged on Warfarin / Coumadin?   No     Depression / Suicide Risk    As you are discharged from this [facility] facility, it is important to learn how to keep safe from harming yourself.    Recognize the warning signs:  · Abrupt changes in personality, positive or negative- including increase in energy   · Giving away possessions  · Change in eating patterns- significant weight changes-  positive or negative  · Change in sleeping patterns- unable to sleep or sleeping all the time   · Unwillingness or inability to communicate  · Depression  · Unusual sadness, discouragement and loneliness  · Talk of wanting to die  · Neglect of personal appearance   · Rebelliousness- reckless behavior  · Withdrawal from people/activities they love  · Confusion- inability to concentrate     If you or a loved one observes any of these behaviors or has concerns about self-harm, here's what you can do:  · Talk about it- your feelings and reasons for harming yourself  · Remove any means that you might use to hurt yourself (examples: pills, rope, extension cords, firearm)  · Get professional help from  the community (Mental Health, Substance Abuse, psychological counseling)  · Do not be alone:Call your Safe Contact- someone whom you trust who will be there for you.  · Call your local CRISIS HOTLINE 591-5998 or 926-836-0087  · Call your local Children's Mobile Crisis Response Team [place] (491) 305-8925 or www.[employer]  · Call the toll free National Suicide Prevention Hotlines   · National Suicide Prevention Lifeline 805-574-VECF (6351)  · National Hope Line Network 800-SUICIDE (822-0160)

## 2019-09-19 NOTE — PROGRESS NOTES
Discharge Instructions    Discharged to home by car with friend. Discharged via wheelchair, hospital escort: Yes.  Special equipment needed: Oxygen    Be sure to schedule a follow-up appointment with your primary care doctor or any specialists as instructed.     Discharge Plan:   Diet Plan: Discussed  Activity Level: Discussed  Smoking Cessation Offered: Patient Counseled  Confirmed Follow up Appointment: Appointment Scheduled  Confirmed Symptoms Management: Discussed  Medication Reconciliation Updated: Yes  Influenza Vaccine Indication: Patient Refuses    I understand that a diet low in cholesterol, fat, and sodium is recommended for good health. Unless I have been given specific instructions below for another diet, I accept this instruction as my diet prescription.   Other diet: regular    Special Instructions: None    · Is patient discharged on Warfarin / Coumadin?   No     Depression / Suicide Risk    As you are discharged from this [facility] facility, it is important to learn how to keep safe from harming yourself.    Recognize the warning signs:  · Abrupt changes in personality, positive or negative- including increase in energy   · Giving away possessions  · Change in eating patterns- significant weight changes-  positive or negative  · Change in sleeping patterns- unable to sleep or sleeping all the time   · Unwillingness or inability to communicate  · Depression  · Unusual sadness, discouragement and loneliness  · Talk of wanting to die  · Neglect of personal appearance   · Rebelliousness- reckless behavior  · Withdrawal from people/activities they love  · Confusion- inability to concentrate     If you or a loved one observes any of these behaviors or has concerns about self-harm, here's what you can do:  · Talk about it- your feelings and reasons for harming yourself  · Remove any means that you might use to hurt yourself (examples: pills, rope, extension cords, firearm)  · Get professional help from  the community (Mental Health, Substance Abuse, psychological counseling)  · Do not be alone:Call your Safe Contact- someone whom you trust who will be there for you.  · Call your local CRISIS HOTLINE 128-1307 or 555-998-2058  · Call your local Children's Mobile Crisis Response Team [place] (179) 668-4831 or www.[employer]  · Call the toll free National Suicide Prevention Hotlines   · National Suicide Prevention Lifeline 566-269-ZQIK (1468)  · National Hope Line Network 800-SUICIDE (197-6255)

## 2019-09-20 NOTE — DOCUMENTATION QUERY
Critical access hospital                                                                       Query Response Note      PATIENT:               NUNU BLUE  ACCT #:                  [id number]  MRN:                     [id number]  :                      1950  ADMIT DATE:       2019 12:53 PM  DISCH DATE:        2019 11:30 AM  RESPONDING  PROVIDER #:        [id number]           QUERY TEXT:    Moderate malnutrition is documented in the Dietary Note dated .  Please specify if you agree or disagree with this finding.    NOTE:  If an appropriate response is not listed below, please respond with a new note.    The patient's Clinical Indicators include:   Dietician Note: Moderate chronic disease related malnutrition as evidenced by severe weight loss and moderate fat and muscle wasting. 18% weight loss in less than six months.   Treatment: Dietician consult; marinol; supplements; encourage oral intake; nutrition rep; monitor weight  Risk Factors: Lung cancer; poor appetite; states food doesn't taste good  Options provided:   -- Agree with dietician finding of moderate malnutrition   -- Disagree with dietician finding of moderate malnutrition   -- Finding of no clinical significance   -- Unable to determine      Query created by: Kayleigh Sanabria on 2019 3:45 PM    RESPONSE TEXT:    Agree with dietician finding of moderate malnutrition          Electronically signed by:  DONYA JOHNSON 2019 8:48 AM

## 2019-09-22 PROBLEM — A41.9 SEPSIS (HCC): Status: ACTIVE | Noted: 2019-01-01

## 2019-09-22 PROBLEM — D64.9 NORMOCYTIC ANEMIA: Status: ACTIVE | Noted: 2019-01-01

## 2019-09-22 PROBLEM — J18.9 CAP (COMMUNITY ACQUIRED PNEUMONIA): Status: ACTIVE | Noted: 2019-01-01

## 2019-09-23 NOTE — H&P
Hospital Medicine History & Physical Note    Date of Service  9/22/2019    Primary Care Physician  Elizabeth Mosquera M.D.    Consultants  None    Code Status  Full code     Chief Complaint  Shortness of breath     History of Presenting Illness  68 y.o. male with history of lung cancer, and chronic respiratory failure from the same, was in his usual state of health until the morning of admission.  He reports he has sudden onset of much worse shortness of breath than typical.  He endorses increased oxygen requirements as well, but denies fever or chills.  He denies significant cough, sputum production, or other symptoms.  On evaluation in the emergency department he was noted to be hypoxic, and required large amounts of oxygen and the use of high flow nasal cannula.  He currently reports feeling somewhat better.  He denies chest pain, no abdominal pain, nausea vomiting, diarrhea or constipation.  His shortness of breath is made worse with activity and somewhat improved with rest.    Review of Systems  Review of Systems   Constitutional: Negative.    HENT: Negative.    Eyes: Negative.    Respiratory: Positive for shortness of breath. Negative for cough and sputum production.    Cardiovascular: Negative.    Gastrointestinal: Negative.    Genitourinary: Negative.    Musculoskeletal: Negative.    Skin: Negative.    Neurological: Negative.    Endo/Heme/Allergies: Negative.    Psychiatric/Behavioral: Negative.        Past Medical History   has a past medical history of Breath shortness, Cancer (HCC) (2008), Lung cancer ([facility]), Pain, and Sinus infection (03/29/2019).    Surgical History   has a past surgical history that includes trans urethral resection bladder (5/28/08); appendectomy; and tonsillectomy.     Family History  family history includes Lung Cancer in his father; No Known Problems in his mother.     Social History   reports that he quit smoking about 5 months ago. His smoking use included cigarettes. He has a  50.00 pack-year smoking history. He has never used smokeless tobacco. He reports that he has current or past drug history. Drugs: Marijuana and Inhaled. He reports that he does not drink alcohol.    Allergies  No Known Allergies    Medications  Prior to Admission Medications   Prescriptions Last Dose Informant Patient Reported? Taking?   HYDROcodone-acetaminophen (NORCO) 7.5-325 MG per tablet 9/22/2019 at 1900 Patient Yes No   Sig: Take 1 Tab by mouth every four hours as needed for Moderate Pain.   albuterol (VENTOLIN HFA) 108 (90 Base) MCG/ACT Aero Soln inhalation aerosol 9/22/2019 at am Patient No No   Sig: Inhale 2 Puffs by mouth every four hours as needed for Shortness of Breath.   amoxicillin-clavulanate (AUGMENTIN) 875-125 MG Tab 9/22/2019 at am Patient Yes Yes   Sig: Take 1 Tab by mouth 2 times a day. 2 days, started 9/19/19   fluticasone (FLONASE) 50 MCG/ACT nasal spray 9/22/2019 at am Patient No No   Sig: Spray 1 Spray in nose every day.   furosemide (LASIX) 20 MG Tab 9/22/2019 at am Patient No No   Sig: Take 1 Tab by mouth every day.   gabapentin (NEURONTIN) 300 MG Cap 9/22/2019 at unknown Patient Yes No   Sig: Take 300 mg by mouth 3 times a day.   guaiFENesin LA (MUCINEX) 600 MG TABLET SR 12 HR 9/22/2019 at am Patient No No   Sig: Take 1 Tab by mouth every 12 hours for 14 days.   ipratropium-albuterol (DUONEB) 0.5-2.5 (3) MG/3ML nebulizer solution 9/22/2019 at unknown Patient No No   Sig: 3 mL by Nebulization route every four hours as needed.   morphine ER (MS CONTIN) 60 MG Tab CR tablet 9/22/2019 at 1900 Patient Yes No   Sig: Take 60 mg by mouth every 12 hours.      Facility-Administered Medications: None       Physical Exam  Temp:  [37.2 °C (98.9 °F)-37.9 °C (100.3 °F)] 37.9 °C (100.3 °F)  Pulse:  [118-123] 119  Resp:  [20-26] 20  BP: (107-127)/(55-66) 117/58  SpO2:  [88 %-96 %] 96 %    Physical Exam   Constitutional: He is oriented to person, place, and time. He appears well-developed and  well-nourished. No distress.   HENT:   Head: Normocephalic and atraumatic.   Eyes: Pupils are equal, round, and reactive to light. Conjunctivae are normal.   Neck: Normal range of motion. Neck supple. No tracheal deviation present. No thyromegaly present.   Cardiovascular: Normal rate, regular rhythm and normal heart sounds. Exam reveals no gallop and no friction rub.   No murmur heard.  Pulmonary/Chest: He is in respiratory distress. He has no wheezes. He has rales.   Abdominal: Soft. Bowel sounds are normal. He exhibits no distension and no mass. There is no tenderness. There is no rebound and no guarding.   Musculoskeletal: Normal range of motion. He exhibits no edema.   Lymphadenopathy:     He has no cervical adenopathy.   Neurological: He is alert and oriented to person, place, and time. No cranial nerve deficit.   Skin: Skin is warm and dry. He is not diaphoretic.   Psychiatric: He has a normal mood and affect.   Nursing note and vitals reviewed.      Laboratory:  Recent Labs     09/22/19 2032   WBC 14.6*   RBC 3.14*   HEMOGLOBIN 9.4*   HEMATOCRIT 30.2*   MCV 96.2   MCH 29.9   MCHC 31.1*   RDW 55.4*   PLATELETCT 320   MPV 8.9*     Recent Labs     09/22/19 2032   SODIUM 131*   POTASSIUM 5.1   CHLORIDE 90*   CO2 33   GLUCOSE 119*   BUN 12   CREATININE 0.54   CALCIUM 9.8     Recent Labs     09/22/19 2032   ALTSGPT 8   ASTSGOT 41   ALKPHOSPHAT 71   TBILIRUBIN 0.5   GLUCOSE 119*         Recent Labs     09/22/19 2032   NTPROBNP 179*         Recent Labs     09/22/19 2032   TROPONINT 15       Urinalysis:    No results found     Imaging:  CT-CTA CHEST PULMONARY ARTERY W/ RECONS   Final Result         1.  No evidence of pulmonary embolism.   2.  Large right lung mass consistent with history of lung cancer with relatively stable mass effect as detailed including mediastinal compression, encasement/narrowing of right sided bronchi and pulmonary arteries.   3.  There is new mucous plugging in the left lower lobe with  new patchy left lower lobe opacities and some mildly increased consolidation in the right lower lobe. This is suspicious for pneumonitis, infection/aspiration.   4.  Pulmonary and adrenal metastases again noted.            US-EXTREMITY VENOUS UPPER UNILAT RIGHT   Final Result      DX-CHEST-PORTABLE (1 VIEW)   Final Result      1.  Large right upper lung mass again noted.   2.  Increase small right pleural effusion.   3.  Right basilar opacity may represent atelectasis, pneumonia and/or malignancy.            Assessment/Plan:  I anticipate this patient will require at least two midnights for appropriate medical management, necessitating inpatient admission.    * CAP (community acquired pneumonia)  Assessment & Plan  Post obstructive in setting of malignancy.  Plan for intravenous antibiotic therapy, monitor.     Sepsis (HCC)  Assessment & Plan  This is Sepsis Present on admission  SIRS criteria identified on my evaluation include: Tachycardia, with heart rate greater than 90 BPM, Tachypnea, with respirations greater than 20 per minute and Leukocyosis, with WBC greater than 12,000  Source is CAP  Sepsis protocol initiated  Fluid resuscitation ordered per protocol  IV antibiotics as appropriate for source of sepsis  While organ dysfunction may be noted elsewhere in this problem list or in the chart, degree of organ dysfunction does not meet CMS criteria for severe sepsis          Malignant neoplasm of overlapping sites of right lung (HCC)- (present on admission)  Assessment & Plan  Continue current outpatient management.     Normocytic anemia  Assessment & Plan  Suspect due to chronic disease in setting of malignancy.  No current evidence of bleed. Transfuse if needed for hemoglobin less than 7 gm/dL      Acute on chronic respiratory failure with hypoxia (HCC)- (present on admission)  Assessment & Plan  With worsening oxygenation and increasing oxygen requirements in setting of CAP.  Currently tolerating high-flow NC  which will be continued.  Patient requests no advanced life support.          VTE prophylaxis: SCD, lovenox

## 2019-09-23 NOTE — RESPIRATORY CARE
COPD EDUCATION by COPD CLINICAL EDUCATOR  9/23/2019 at 6:52 AM by Elda Beal     Patient reviewed by COPD education team. Patient does not have a history or diagnosis of COPD and is a non-smoker, therefore does not qualify for the COPD program.

## 2019-09-23 NOTE — ED NOTES
IV established by US. BC and labs collected. Pt placed on oxy mask at 8 lmp.   RT at bedside for neb.

## 2019-09-23 NOTE — ED PROVIDER NOTES
ED Provider Note    Scribed for Goldy Colón M.D. by Cordell Townsend. 9/22/2019, 8:23 PM.    Primary care provider: Elizabeth Mosquera M.D.  Means of arrival: Walk in  History obtained from: Patient  History limited by: None    CHIEF COMPLAINT  Chief Complaint   Patient presents with   • Shortness of Breath     Hx of Lung CA       HPI  Ladarius Khan is a 68 y.o. male with stage 4 lung cancer who presents to the Emergency Department for evaluation of shortness of breath that has worsened over the course of today. The patient states prior to shortness of breath he had noticed worsening swelling in his right upper extremity, but denies any associated cough, chest pain, or fever. No alleviating or aggravating factors are identified. The patient does note he was recently discharged from the hospital earlier this month after being diagnosed with pneumonia, but had been improving and was able to ambulate around his house on 3 L supplemental oxygen without difficulties. He did not increase his oxygen at home. Patient was undergoing chemotherapy, but his treatment was suspended when he was hospitalized for pneumonia. He reports he last received chemotherapy around August 26th. He states he has been taking his morphine daily for pain management. He takes all other medications as prescribed.     REVIEW OF SYSTEMS  Pertinent positives include shortness of breath and right upper extremity swelling. Pertinent negatives include no cough, chest pain, or fever. As above, all other systems reviewed and are negative.   See HPI for further details.     PAST MEDICAL HISTORY   has a past medical history of Breath shortness, Cancer (HCC) (2008), Lung cancer (HCC), Pain, and Sinus infection (03/29/2019).    SURGICAL HISTORY   has a past surgical history that includes trans urethral resection bladder (5/28/08); appendectomy; and tonsillectomy.    SOCIAL HISTORY  Social History     Tobacco Use   • Smoking status: Former Smoker      "Packs/day: 1.00     Years: 50.00     Pack years: 50.00     Types: Cigarettes     Last attempt to quit: 2019     Years since quittin.4   • Smokeless tobacco: Never Used   Substance Use Topics   • Alcohol use: No   • Drug use: Not Currently     Types: Marijuana, Inhaled     Comment:  history of marijuana use      Social History     Substance and Sexual Activity   Drug Use Not Currently   • Types: Marijuana, Inhaled    Comment:  history of marijuana use       FAMILY HISTORY  Family History   Problem Relation Age of Onset   • No Known Problems Mother    • Lung Cancer Father         smoker   • Diabetes Neg Hx    • Heart Disease Neg Hx    • Stroke Neg Hx    • Drug abuse Neg Hx    • Alcohol abuse Neg Hx        CURRENT MEDICATIONS  Home Medications     Reviewed by Ranjan Loredo (Pharmacy Tech) on 19 at 2219  Med List Status: Complete   Medication Last Dose Status   albuterol (VENTOLIN HFA) 108 (90 Base) MCG/ACT Aero Soln inhalation aerosol 2019 Active   amoxicillin-clavulanate (AUGMENTIN) 875-125 MG Tab 2019 Active   fluticasone (FLONASE) 50 MCG/ACT nasal spray 2019 Active   furosemide (LASIX) 20 MG Tab 2019 Active   gabapentin (NEURONTIN) 300 MG Cap 2019 Active   guaiFENesin LA (MUCINEX) 600 MG TABLET SR 12 HR 2019 Active   HYDROcodone-acetaminophen (NORCO) 7.5-325 MG per tablet 2019 Active   ipratropium-albuterol (DUONEB) 0.5-2.5 (3) MG/3ML nebulizer solution 2019 Active   morphine ER (MS CONTIN) 60 MG Tab CR tablet 2019 Active                ALLERGIES  No Known Allergies    PHYSICAL EXAM  VITAL SIGNS: /66   Pulse (!) 120   Temp 37.2 °C (98.9 °F) (Temporal)   Resp 20   Ht 1.93 m (6' 4\")   Wt 81.6 kg (180 lb)   SpO2 94%   BMI 21.91 kg/m²   Vitals reviewed.  Constitutional: Alert. Chronically ill appearing.   HENT: No signs of trauma, Bilateral external ears normal, Nose normal. Moist mucous membranes.  Eyes: Pupils are equal and reactive, " "Conjunctiva normal, Non-icteric.   Neck: Normal range of motion, No tenderness, Supple, No stridor.   Lymphatic: No lymphadenopathy noted.   Cardiovascular: Tachycardic rate and regular rhythm, no murmurs.   Thorax & Lungs: Nasal canula in place. Crackles in base of left lung with decreased breath sounds at apex of right lung.   Abdomen: Bowel sounds normal, Soft, No tenderness, No peritoneal signs, No masses, No pulsatile masses.   Skin: Warm, Dry, No erythema, No rash.   Back: Normal alignment.    Extremities: Intact distal pulses, Right upper extremity edema. Bilateral lower extremity edema and skin changes consistent with chronic venous stasis changes. No tenderness, No cyanosis  Musculoskeletal: Good range of motion in all major joints. No major deformities noted.   Neurologic: Alert, Normal motor function, Normal sensory function, No focal deficits noted.   Psychiatric: Affect normal, Judgment normal, Mood normal.     DIAGNOSTIC STUDIES / PROCEDURES    LABS  Labs Reviewed   CBC WITH DIFFERENTIAL - Abnormal; Notable for the following components:       Result Value    WBC 14.6 (*)     RBC 3.14 (*)     Hemoglobin 9.4 (*)     Hematocrit 30.2 (*)     MCHC 31.1 (*)     RDW 55.4 (*)     MPV 8.9 (*)     Neutrophils-Polys 88.80 (*)     Lymphocytes 3.80 (*)     Neutrophils (Absolute) 12.92 (*)     Lymphs (Absolute) 0.55 (*)     Monos (Absolute) 0.99 (*)     All other components within normal limits   COMP METABOLIC PANEL - Abnormal; Notable for the following components:    Sodium 131 (*)     Chloride 90 (*)     Glucose 119 (*)     All other components within normal limits   PROBRAIN NATRIURETIC PEPTIDE, NT - Abnormal; Notable for the following components:    NT-proBNP 179 (*)     All other components within normal limits   TROPONIN   PROCALCITONIN   ESTIMATED GFR   BLOOD CULTURE,HOLD   BLOOD CULTURE    Narrative:     Per Hospital Policy: Only change Specimen Src: to \"Line\" if  specified by physician order.   BLOOD " CULTURE      All labs reviewed by me.    EKG Interpretation:  Interpreted by me    Results for orders placed or performed during the hospital encounter of 19   EKG   Result Value Ref Range    Report       [facility] Emergency Dept.    Test Date:  2019  Pt Name:    NUNU BLUE                  Department: ER  MRN:        [id number]                      Room:       T815  Gender:     Male                         Technician: [id number]  :        1950                   Requested By:ER TRIAGE PROTOCOL  Order #:    [id number]                    Reading MD: Goldy Colón MD    Measurements  Intervals                                Axis  Rate:       121                          P:          73  AL:         152                          QRS:        69  QRSD:       94                           T:          65  QT:         308  QTc:        437    Interpretive Statements  SINUS TACHYCARDIA  PROBABLE LEFT ATRIAL ABNORMALITY  RSR' IN V1 OR V2, RIGHT VCD OR RVH  BASELINE WANDER IN LEAD(S) V1  No previous ECG available for comparison    Electronically Signed On 2019 0:04:41 PDT by Goldy Colón MD       RADIOLOGY  CT-CTA CHEST PULMONARY ARTERY W/ RECONS   Final Result         1.  No evidence of pulmonary embolism.   2.  Large right lung mass consistent with history of lung cancer with relatively stable mass effect as detailed including mediastinal compression, encasement/narrowing of right sided bronchi and pulmonary arteries.   3.  There is new mucous plugging in the left lower lobe with new patchy left lower lobe opacities and some mildly increased consolidation in the right lower lobe. This is suspicious for pneumonitis, infection/aspiration.   4.  Pulmonary and adrenal metastases again noted.            US-EXTREMITY VENOUS UPPER UNILAT RIGHT         DX-CHEST-PORTABLE (1 VIEW)   Final Result      1.  Large right upper lung mass again noted.   2.  Increase small right pleural effusion.   3.  Right  basilar opacity may represent atelectasis, pneumonia and/or malignancy.        The radiologist's interpretation of all radiological studies have been reviewed by me.    COURSE & MEDICAL DECISION MAKING  Nursing notes, VS, PMSFHx reviewed in chart.  Differential diagnoses include but not limited to: PE, pneumonia, SVC syndrome, DVT.      8:23 PM Patient seen and examined at bedside. Patient arrives tachycardic and hypoxic beyond baseline. Patient appears well hydrated and non-toxic. The physical exam is remarkable for crackles in base of left lung with decreased breath sounds at apex of right lung. Ordered for US-extremity venous upper uinlateral, CT-CTA Chest pulmonary artery, DX-Chest (1 view), Troponin, CBC with differential, CMP, BNP, Procalcitonin, and EKG to evaluate. Patient will be treated with Duoneb nebulizer treatment for his symptoms.      CBC reveals leukocytosis with neutrophilic predominance. Sodium is low at 131. Potassium is normal. Glucose is 119. Troponin is normal and BNP slightly elevated to 179.  Procalcitonin is not elevated but because of patient's leukocytosis and tachycardia I am going to treat him as a bacterial pneumonia with Zosyn and vancomycin.  He will also be given IV fluids for tachycardia.    Chest x-ray reveals a large right upper lung mass once again.  DVT ultrasound of the right upper extremity did not reveal any acute abnormality and a CTA of the chest did not reveal PE.  Noted was pneumonia mucous plugging in the left lower lobe with new patchy left lower lobe opacities and some mildly increased consolidation in the right lower lobe.  There is concern for pneumonitis and/or infection or aspiration.  Patient will require admission to the hospital for additional management.    9:51 PM - Paged Hospitalist.    9:52 PM - I discussed the patient's case and the above findings with Dr. Childress (Hospitalist) who agrees to admit the patient.     10:06 PM - Notified by nursing staff the  patient's oxygen sats had dropped to the high 90s despite 10 L of supplemental oxygen.  RT was paged and placed the patient on high flow nasal cannula.  Hospitalist was notified.  Spoke with Dr. Childress (Hospitalist) who was updated on plan to admit patient to the unit and place him on BiPAP.     HYDRATION: Based on the patient's presentation of Tachycardia the patient was given IV fluids. IV Hydration was used because oral hydration was not adequate alone. Upon recheck following hydration, the patient was Unchanged.    DISPOSITION:  Patient will be admitted to Dr. Childress (Hospitalist) in guarded condition.      FINAL IMPRESSION  1. Pneumonia of both lungs due to infectious organism, unspecified part of lung    2. Malignant neoplasm of upper lobe of right lung (HCC)          Cordell BRIDGES (Scribe), am scribing for, and in the presence of, Goldy Colón M.D..    Electronically signed by: Cordell Townsend (Ren), 9/22/2019    IGoldy M.D. personally performed the services described in this documentation, as scribed by Cordell Townsend in my presence, and it is both accurate and complete.    C.    The note accurately reflects work and decisions made by me.  Goldy Colón  9/23/2019  12:10 AM

## 2019-09-23 NOTE — CARE PLAN
Problem: Safety  Goal: Will remain free from falls  Outcome: PROGRESSING AS EXPECTED   Fall risk assessed, and in place.  Patient is high fall risk.  Patient educated not to get up with out assistance.  Patient verbalized understanding.  Bed alarm in place and on.  Call light with in reach.      Problem: Pain Management  Goal: Pain level will decrease to patient's comfort goal  Outcome: PROGRESSING AS EXPECTED   Monitor pain per protocol, medicated per MD orders and pain scale.

## 2019-09-23 NOTE — ASSESSMENT & PLAN NOTE
Multifactorial due to postobstructive pneumonia with mucous plugs, advanced lung cancer, and atelectatic changes-clinically worsening.    change to comfort care.  Discussed with patient.  Provide pain, dyspnea control with IV narcotics.  Patient currently wants to remain on high flow oxygen support.

## 2019-09-23 NOTE — PROGRESS NOTES
Pt brought up to unit with Zoll monitor and this RN. Pt hooked up to tele box, monitor room notified. VSS. Pt on high flow oxygen, RT bedside.

## 2019-09-23 NOTE — CARE PLAN
Problem: Nutritional:  Goal: Achieve adequate nutritional intake  Description  Patient will consume 50% of meals   Outcome: NOT MET    Please see RD note.

## 2019-09-23 NOTE — PROGRESS NOTES
2 RN skin check complete with NATHAN Oliveros.  Devices in place: High flow O2, glasses, PIV, tele box.  Skin assessed under devices: Yes.  Confirmed pressure ulcers found on: N/A.  New potential pressure ulcers noted on: N/A. Wound consult not needed.  The following interventions in place: Moisturizer, pt turns self side to side, pillows for support and positioning    Bilateral ears are red and blanching  Bilateral legs are dry, dusky, flaky  Bilateral heels calloused

## 2019-09-23 NOTE — ED NOTES
Patient awake alert and oriented x 4, Glascow 15, bed in low position, call light within reach, on oxymask, attached to cardiac monitor, unlabored breathing noted, no cough noted, interacts with staff, interactions noted as appropriate.

## 2019-09-23 NOTE — ASSESSMENT & PLAN NOTE
Post obstructive in setting of malignancy with mucous plugs seen and left lung.  No fevers, white cell count declined.  Continue with Mucomyst, Mucinex, CPT to try to mobilize secretions, bronchodilator therapy  Previous sputum culture positive Klebsiella.    Antibiotics stopped with transition to comfort care

## 2019-09-23 NOTE — PROGRESS NOTES
Assumed care from [place] EVANS. Received bedside report.  Updated patient on daily plan of care on white board. Patient denies any additional needs at this time.  Patient belongings and call light with in reach.  Vitals stable. Bed alarm on and working appropriately.  Will continue to monitor.

## 2019-09-23 NOTE — DIETARY
"Nutrition services: Day 1 of admit.  Ladarius Khan is a 68 y.o. male with admitting DX of community acquired pneumonia.   Consult received for 34# wt loss and decreased appetite reported on nutrition admit screen.     Pt seen by RD at previous admit (9/16/19) and reported 40# wt loss in less than 6 months. Visited pt at bedside. Reports continued poor appetite since admit last week. Pt states that supplements are all he can tolerate at this time.  RD to obtain supplement order.     Assessment:  Height: 193 cm (6' 4\")  Weight: 81.6 kg (180 lb)- Standing  Body mass index is 21.91 kg/m²., BMI classification: Normal.   Diet/Intake: Regular; < 25% - 50% x 2 meals.     Evaluation:   1. Pt noted with CAP, sepsis, lung cancer with metastasis to the ribs, normocytic anemia, hypoxia. Admitted 9/16 for pneumonia, discharged 9/19.   2. PO average < 25% - 50% x 2 meals since admit, reports very poor PO. PO % 9/16-9/19.    3. Nutrition-focused physical exam: observed moderate muscle wasting (clavicle, deltoid).   4. Wt hx: Pt with 3% wt loss x 1 week.  · Current (9/22): 81.6 kg, standing.   · 9/14/19: 84 kg, standing;  · 5/15/19: 92.1 kg, standng;     Malnutrition Risk: Pt with sever malnutrition in the context of acute on chronic disease r/t PNA and lung cancer as evidenced by 3% wt loss in 1 week and moderate muscle wasting (clavicle, deltoid).     Recommendations/Plan:  1. Continue diet as ordered. RD to obtain supplement orders.   2. Encourage intake of meals and supplements.   3. Document intake of all meals and snacks as % taken in ADL's to provide interdisciplinary communication across all shifts.   4. Monitor weight.  5. Nutrition rep will continue to see patient for ongoing meal and snack preferences.     RD Following.           "

## 2019-09-23 NOTE — ED NOTES
MD notified that patient has increase respiratory distressing requiring 15 lpm via MASK and tacypnic at 24-26. Orders for bipap. Call to RT

## 2019-09-23 NOTE — ASSESSMENT & PLAN NOTE
Comfort Care    This is Sepsis Present on admission with Tachycardia, with heart rate greater than 90 BPM, Tachypnea, with respirations greater than 20 per minute and Leukocyosis, with WBC greater than 12,000 and hypoxia.  Suspect postobstructive versus commutty acquired pneumonia.  Sepsis protocol initiated  Fluid resuscitation ordered per protocol  Previous sputum positive for Klebsiella.  No new sputum cultures obtainable.  Plan DC IV antibiotics

## 2019-09-23 NOTE — ED NOTES
Med rec completed per pt at bedside  Allergies reviewed - NKDA  Pt was discharged on 9/19/19 with 2 days worth of Augmentin left. Pt reports he is still taking, possibly 1-2 doses left

## 2019-09-23 NOTE — ED TRIAGE NOTES
Ladarius Khan  Male  68 y.o.  Chief Complaint   Patient presents with   • Shortness of Breath     Hx of Lung CA     Present to triage c/o SOB. Hx of lung CA but SOB worse tonight. Patient on 3 liters of oxygen at home 24/7 with pulse ox of 80's. Patient placed on 6 liters in triage. Denies pain with inspiration.

## 2019-09-23 NOTE — ASSESSMENT & PLAN NOTE
Worsening  severe dyspnea, severe pain from terminal lung cancer-plan change to comfort care per patient's wishes.  Provide PRN IV morphine, Ativan for anxiety, atropine as needed.  Initiate comfort care protocols.  Discussed with palliative care.  Transferred to oncology floor.

## 2019-09-24 PROBLEM — E87.6 HYPOKALEMIA: Status: ACTIVE | Noted: 2019-01-01

## 2019-09-24 NOTE — CARE PLAN
Problem: Safety  Goal: Will remain free from falls  Outcome: PROGRESSING AS EXPECTED  Intervention: Assess risk factors for falls  Flowsheets (Taken 9/23/2019 2044)  Pt Calls for Assistance: Yes  Fall Risk: High Risk to Fall - 2 or more points   History of fall: 0  Mobility Status Assessment: 1-1 Healthcare Provider Required for Assistance with Ambulation & Transfer  Risk for Injury-Any positive answers results in the pt being at high risk for fall related injury: Not Applicable  Note:   Fall precautions in place. Bed in lowest position. Non-skid socks in place. Personal possessions within reach. Mobility sign on door. Bed-alarm on. Call light within reach. Pt educated regarding fall prevention and states understanding.       Problem: Knowledge Deficit  Goal: Knowledge of disease process/condition, treatment plan, diagnostic tests, and medications will improve  Outcome: PROGRESSING AS EXPECTED  Intervention: Assess knowledge level of disease process/condition, treatment plan, diagnostic tests, and medications  Note:   Pt educated regarding plan of care and medications. All questions answered.        Problem: Falls - Risk of  Goal: *Absence of Falls  Document Khris Fall Risk and appropriate interventions in the flowsheet.    Outcome: Progressing Towards Goal  Fall Risk Interventions:              Medication Interventions: Teach patient to arise slowly, Patient to call before getting OOB     Elimination Interventions: Call light in reach     History of Falls Interventions: Door open when patient unattended

## 2019-09-24 NOTE — CARE PLAN
Problem: Communication  Goal: The ability to communicate needs accurately and effectively will improve  Outcome: PROGRESSING AS EXPECTED     Pt communicated with effectively. Pt updated on plan of care and procedures.   Problem: Safety  Goal: Will remain free from injury  Outcome: PROGRESSING AS EXPECTED    Pt appropriately assessed for risk of injury and falls. Appropriate assistive devices used. Will continue to assess.

## 2019-09-24 NOTE — PROGRESS NOTES
Bedside report received. Bed locked and in low position. Call light within reach. Pt A&O4. Pt on 55L high flow, at 50%. Will continue to monitor.

## 2019-09-24 NOTE — ASSESSMENT & PLAN NOTE
Patient with extensive bilateral leg edema with chronic changes with erythema and skin breakdown of the lower extremities.  Echo with preserved LV function EF 55%.  Improved symptoms

## 2019-09-24 NOTE — PROGRESS NOTES
[facility] Medicine Daily Progress Note    Date of Service  9/23/2019    Chief Complaint  68 y.o. male admitted 9/22/2019 with severe SOB.    Hospital Course    9/23:  This 67 yo male with history of lung cancer last seen by Dr. Llanos 1 week ago, no recent chemotherapy due to his multiple bouts of pneumonia.  Patient states this is his third bout of pneumonia in the last few months.  He is coughing up green's sputum for the last 5 days.  The patient has also been edematous in the lower extremities.  He has a wet frothy cough.  He was placed on IV fluids on admission which I have discontinued.  I have also ordered RT protocol for albuterol treatments Mucomyst was ordered.  Change Unasyn to Zosyn IV for the repeat pneumonias.  I have also started Lasix 40 mg IV twice daily.  Patient was nearly in extremis on exam with shortness of breath and wet cough.  Reassessment after 1 dose of Lasix he has been diuresing and is doing better.  Patient CT a shows no PE however right lung mass noted right lower lobe consolidation mucous plugging noted in the left lower lobe as well as consolidation noted.  Patient is sinus tach 103 to 1 12 on monitor with 5 beats of V. tach noted.  Asymptomatic.  I have ordered an echocardiogram.  There is no prior echo in [facility].  Critical care time 50 minutes.*        Consultants/Specialty  none    Code Status  DNR/DNI    Disposition  Will need weaning off high flow, diuresis, continue tele monitor.    Review of Systems  Review of Systems   Constitutional: Negative for chills, diaphoresis, fever and malaise/fatigue.   HENT: Negative for congestion and sore throat.    Eyes: Negative for pain and discharge.   Respiratory: Positive for shortness of breath. Negative for cough, hemoptysis, sputum production and wheezing.    Cardiovascular: Positive for leg swelling. Negative for chest pain, palpitations and claudication.   Gastrointestinal: Negative for abdominal pain, constipation, diarrhea, melena,  nausea and vomiting.   Genitourinary: Negative for dysuria, frequency and urgency.   Musculoskeletal: Negative for back pain, joint pain, myalgias and neck pain.   Skin: Negative for itching and rash.   Neurological: Negative for dizziness, sensory change, speech change, focal weakness, loss of consciousness, weakness and headaches.   Endo/Heme/Allergies: Does not bruise/bleed easily.   Psychiatric/Behavioral: Negative for depression, substance abuse and suicidal ideas.        Physical Exam  Temp:  [36.7 °C (98.1 °F)-37.9 °C (100.3 °F)] 37.5 °C (99.5 °F)  Pulse:  [] 110  Resp:  [16-26] 18  BP: (107-139)/(55-75) 132/69  SpO2:  [88 %-96 %] 92 %    Physical Exam   Constitutional: He is oriented to person, place, and time. He appears well-developed and well-nourished. No distress.   In acute respiratory distress, on high flow O2.   HENT:   Head: Normocephalic and atraumatic.   Mouth/Throat: Oropharynx is clear and moist. No oropharyngeal exudate.   Eyes: Pupils are equal, round, and reactive to light. Conjunctivae and EOM are normal. Right eye exhibits no discharge. Left eye exhibits no discharge. No scleral icterus.   Neck: Normal range of motion. Neck supple. No JVD present. No tracheal deviation present. No thyromegaly present.   Cardiovascular: Normal rate, regular rhythm and normal heart sounds. Exam reveals no gallop and no friction rub.   No murmur heard.  Pulmonary/Chest: He is in respiratory distress. He has no wheezes. He has rales (extensive rales noted on exam). He exhibits no tenderness.   Abdominal: Soft. Bowel sounds are normal. He exhibits no distension and no mass. There is no tenderness. There is no rebound and no guarding.   Musculoskeletal: Normal range of motion. He exhibits edema. He exhibits no tenderness.   Lymphadenopathy:     He has no cervical adenopathy.   Neurological: He is alert and oriented to person, place, and time. No cranial nerve deficit. He exhibits normal muscle tone.   Skin:  Skin is warm and dry. No rash noted. He is not diaphoretic. No erythema.   Psychiatric: He has a normal mood and affect. His behavior is normal. Judgment and thought content normal.   Nursing note and vitals reviewed.      Fluids    Intake/Output Summary (Last 24 hours) at 9/23/2019 1854  Last data filed at 9/23/2019 1710  Gross per 24 hour   Intake --   Output 2950 ml   Net -2950 ml       Laboratory  Recent Labs     09/22/19 2032 09/23/19  0752   WBC 14.6* 16.7*   RBC 3.14* 2.98*   HEMOGLOBIN 9.4* 9.2*   HEMATOCRIT 30.2* 29.3*   MCV 96.2 98.3*   MCH 29.9 30.9   MCHC 31.1* 31.4*   RDW 55.4* 54.8*   PLATELETCT 320 287   MPV 8.9* 8.8*     Recent Labs     09/22/19 2032 09/23/19  0412   SODIUM 131* 134*   POTASSIUM 5.1 3.9   CHLORIDE 90* 94*   CO2 33 32   GLUCOSE 119* 91   BUN 12 10   CREATININE 0.54 0.45*   CALCIUM 9.8 9.3     Recent Labs     09/22/19 2032   INR 1.18*               Imaging  EC-ECHOCARDIOGRAM COMPLETE W/O CONT   Final Result      DX-CHEST-PORTABLE (1 VIEW)   Final Result      1.  No significant change      2.  Combination of mass and atelectasis throughout the right hemithorax      CT-CTA CHEST PULMONARY ARTERY W/ RECONS   Final Result         1.  No evidence of pulmonary embolism.   2.  Large right lung mass consistent with history of lung cancer with relatively stable mass effect as detailed including mediastinal compression, encasement/narrowing of right sided bronchi and pulmonary arteries.   3.  There is new mucous plugging in the left lower lobe with new patchy left lower lobe opacities and some mildly increased consolidation in the right lower lobe. This is suspicious for pneumonitis, infection/aspiration.   4.  Pulmonary and adrenal metastases again noted.            US-EXTREMITY VENOUS UPPER UNILAT RIGHT   Final Result      DX-CHEST-PORTABLE (1 VIEW)   Final Result      1.  Large right upper lung mass again noted.   2.  Increase small right pleural effusion.   3.  Right basilar opacity may  represent atelectasis, pneumonia and/or malignancy.           Assessment/Plan  * CAP (community acquired pneumonia)  Assessment & Plan  Post obstructive in setting of malignancy.  Plan for intravenous antibiotic therapy, monitor.   Ordered RT protocol Mucomyst for mucous plugging albuterol neb treatments DuoNeb.  I have increased patient's antibiotic coverage from Unasyn to Zosyn since this is the third bout of antibiotics in the last couple of months.  I have ordered sputum culture induced.    Sepsis (HCC)  Assessment & Plan  This is Sepsis Present on admission  SIRS criteria identified on my evaluation include: Tachycardia, with heart rate greater than 90 BPM, Tachypnea, with respirations greater than 20 per minute and Leukocyosis, with WBC greater than 12,000  Source is CAP  Sepsis protocol initiated  Fluid resuscitation ordered per protocol  IV antibiotics as appropriate for source of sepsis  While organ dysfunction may be noted elsewhere in this problem list or in the chart, degree of organ dysfunction does not meet CMS criteria for severe sepsis          Malignant neoplasm of overlapping sites of right lung (HCC)- (present on admission)  Assessment & Plan  Continue current outpatient management.     Normocytic anemia- (present on admission)  Assessment & Plan  Suspect due to chronic disease in setting of malignancy.  No current evidence of bleed. Transfuse if needed for hemoglobin less than 7 gm/dL      Acute on chronic respiratory failure with hypoxia (HCC)- (present on admission)  Assessment & Plan  With worsening oxygenation and increasing oxygen requirements in setting of CAP.  Currently tolerating high-flow NC which will be continued.  Patient requests no advanced life support.      Bilateral leg edema  Assessment & Plan  Patient with extensive bilateral leg edema with chronic changes with erythema and skin breakdown of the lower extremities.  Rales noted on exam.  Check echocardiogram.  Since patient in  acute respiratory distress, ordered Lasix 40 mg IV twice daily.         VTE prophylaxis: lovenox

## 2019-09-24 NOTE — PROGRESS NOTES
Assumed care at 1900, bedside report received from day shift RN. Pt. Is ST on the monitor. Initial assessment completed, orders reviewed, call light within reach, bed alarm is in use, and hourly rounding in place. POC addressed with patient, no additional questions at this time.

## 2019-09-24 NOTE — PROGRESS NOTES
Monitor Summary    -113    R PVCs  R [facility]  PAC  .16/.08/.30

## 2019-09-24 NOTE — CARE PLAN
Problem: Oxygenation:  Goal: Maintain adequate oxygenation dependent on patient condition  Outcome: PROGRESSING AS EXPECTED     Problem: Bronchoconstriction:  Goal: Improve in air movement and diminished wheezing  Outcome: PROGRESSING AS EXPECTED     Problem: Bronchopulmonary Hygiene:  Goal: Increase mobilization of retained secretions  Outcome: PROGRESSING AS EXPECTED    G5 WAND 4 TIMES A DAY.   DUONEB/MUCOMYST IN LINE Q4.

## 2019-09-25 NOTE — CARE PLAN
Problem: Communication  Goal: The ability to communicate needs accurately and effectively will improve  Outcome: PROGRESSING AS EXPECTED    Pt communicated on the plan of care and procedures. Will continue to ask questions and update.      Problem: Safety  Goal: Will remain free from injury  Outcome: PROGRESSING AS EXPECTED    Pt appropriately assessed for risk for injury and or falls. Will continue to monitor.

## 2019-09-25 NOTE — PROGRESS NOTES
Bedside report received. Bed locked and in low position. Call light within reach.  Pt up in chair, 40% 55L NC. Pt A&O4. Does not want pain med at this time, would like to wait until scheduled med. Will continue to monitor.

## 2019-09-25 NOTE — PROGRESS NOTES
Bedside report received by bessy Weldon. Patient sitting up in bed watching TV and finishing dinner at this time. POC discussed, verbalized understanding. No immediate concerns for patient at this time. All safety measures in place.

## 2019-09-25 NOTE — PROGRESS NOTES
[facility] Medicine Daily Progress Note    Date of Service  9/25/2019    Chief Complaint  68 y.o. male admitted 9/22/2019 with worsening shortness of breath.    Hospital Course      68-year-old male history of lung cancer, chronic resp failure follows with Dr. Llanos, no recent chemotherapy, recurrent pneumonia admitted with his third bout of pneumonia.  CT scan showed no PE with noted right lung mass with right lower lobe consolidation and mucous plugging.  Patient initiated on IV antibiotics.  Acute hypoxic respite failure requiring high O2.      Interval Problem Update    Remains on high flow -- O2 decreased to 35 L/min.  Remains on 40% FiO2.  Increased sputum production on CPT.  Hypokalemic.    Consultants/Specialty  None       Code Status  Full code    Disposition    On high flow nasal cannula, trying to taper down oxygen, to be determined    Review of Systems  Review of Systems   Constitutional: Positive for malaise/fatigue. Negative for chills, diaphoresis and fever.   HENT: Positive for congestion.    Eyes: Negative for blurred vision and redness.   Respiratory: Positive for cough, sputum production and shortness of breath. Negative for wheezing and stridor.    Cardiovascular: Positive for chest pain (Pleuritic right-sided) and leg swelling. Negative for palpitations.   Gastrointestinal: Negative for abdominal pain, diarrhea and vomiting.   Genitourinary: Negative for flank pain and hematuria.   Musculoskeletal: Negative for back pain, joint pain and neck pain.   Skin: Negative for itching and rash.   Neurological: Negative for dizziness, tremors, sensory change, speech change, focal weakness, weakness and headaches.   Psychiatric/Behavioral: Negative for hallucinations and substance abuse.        Physical Exam  Temp:  [36.7 °C (98 °F)-36.9 °C (98.5 °F)] 36.7 °C (98 °F)  Pulse:  [] 101  Resp:  [17-24] 18  BP: (108-132)/(64-78) 108/64  SpO2:  [90 %-96 %] 95 %    Physical Exam   Constitutional: He is  oriented to person, place, and time. He appears distressed.   HENT:   Head: Normocephalic and atraumatic.   Right Ear: External ear normal.   Left Ear: External ear normal.   Nose: Nose normal.   Eyes: Conjunctivae and EOM are normal. No scleral icterus.   Neck: Normal range of motion. No JVD present.   Cardiovascular: Normal rate and regular rhythm.   No murmur heard.  Pulmonary/Chest: No stridor. He is in respiratory distress. He has no wheezes. He has rales.   Diminished breath sounds bases--right greater than left.  Tachypnea with exertion.   Abdominal: Soft. Bowel sounds are normal. He exhibits no distension. There is no tenderness.   Musculoskeletal: He exhibits edema. He exhibits no tenderness.   Lower extremity, 1-2+ . negative Homans sign   Neurological: He is alert and oriented to person, place, and time. No cranial nerve deficit.   No gross focal weakness   Skin: Skin is warm and dry. He is not diaphoretic. No pallor.   Psychiatric:   Calm, cooperative   Vitals reviewed.      Fluids    Intake/Output Summary (Last 24 hours) at 9/25/2019 1128  Last data filed at 9/25/2019 0800  Gross per 24 hour   Intake 720 ml   Output 3095 ml   Net -2375 ml       Laboratory  Recent Labs     09/23/19  0752 09/24/19  0053 09/24/19  2342   WBC 16.7* 14.9* 12.0*   RBC 2.98* 3.05* 3.25*   HEMOGLOBIN 9.2* 9.0* 9.9*   HEMATOCRIT 29.3* 29.8* 31.6*   MCV 98.3* 97.7 97.2   MCH 30.9 29.5 30.5   MCHC 31.4* 30.2* 31.3*   RDW 54.8* 54.2* 54.0*   PLATELETCT 287 288 300   MPV 8.8* 8.9* 8.9*     Recent Labs     09/23/19  0412 09/24/19  0053 09/24/19  2342   SODIUM 134* 134* 135   POTASSIUM 3.9 3.4* 3.2*   CHLORIDE 94* 91* 89*   CO2 32 35* 37*   GLUCOSE 91 129* 115*   BUN 10 10 10   CREATININE 0.45* 0.58 0.52   CALCIUM 9.3 9.4 10.1     Recent Labs     09/22/19 2032   INR 1.18*               Imaging  DX-CHEST-PORTABLE (1 VIEW)   Final Result      1.  No significant change      2.  Combination of mass and atelectasis throughout the right  hemithorax      EC-ECHOCARDIOGRAM COMPLETE W/O CONT   Final Result      DX-CHEST-PORTABLE (1 VIEW)   Final Result      1.  No significant change      2.  Combination of mass and atelectasis throughout the right hemithorax      CT-CTA CHEST PULMONARY ARTERY W/ RECONS   Final Result         1.  No evidence of pulmonary embolism.   2.  Large right lung mass consistent with history of lung cancer with relatively stable mass effect as detailed including mediastinal compression, encasement/narrowing of right sided bronchi and pulmonary arteries.   3.  There is new mucous plugging in the left lower lobe with new patchy left lower lobe opacities and some mildly increased consolidation in the right lower lobe. This is suspicious for pneumonitis, infection/aspiration.   4.  Pulmonary and adrenal metastases again noted.            US-EXTREMITY VENOUS UPPER UNILAT RIGHT   Final Result      DX-CHEST-PORTABLE (1 VIEW)   Final Result      1.  Large right upper lung mass again noted.   2.  Increase small right pleural effusion.   3.  Right basilar opacity may represent atelectasis, pneumonia and/or malignancy.           Assessment/Plan  * Acute on chronic respiratory failure with hypoxia (HCC)- (present on admission)  Assessment & Plan  Multifactorial due to postobstructive pneumonia with mucous plugs, advanced lung cancer, and atelectatic changes.   Bronchodilator therapy  Mobilize secretions continue CPT, Mucinex, Mucomyst  Try to obtain sputum culture to direct antibiotic , continue IV Zosyn.  Patient requests no advanced life support.    High flow nasal cannula--titrating down flow rate to 35 L/min at FiO2 40%  Encourage I-S  Discussed case  with RT  Telemetry monitoring.    CAP (community acquired pneumonia)  Assessment & Plan  Post obstructive in setting of malignancy with mucous plugs.  Continue RT protocol, Mucomyst, bronchodilator therapy with DuoNeb.  Scheduled Mucinex mucolytic with CPT  Try to obtain sputum  culture  Continue IV Zosyn      Sepsis (HCC)  Assessment & Plan  This is Sepsis Present on admission with Tachycardia, with heart rate greater than 90 BPM, Tachypnea, with respirations greater than 20 per minute and Leukocyosis, with WBC greater than 12,000 and hypoxia.  Suspect postobstructive versus commutty acquired pneumonia.  Sepsis protocol initiated  Fluid resuscitation ordered per protocol  Previous sputum positive for Klebsiella.  Continue IV Zosyn-try to obtain sputum culture            Malignant neoplasm of overlapping sites of right lung (HCC)- (present on admission)  Assessment & Plan  Right sided pleuritic chest pain due to malignancy.  Continue scheduled MS Contin, Neurontin, PRN oxycodone, IV morphine.  Palliative care consultation.    Hypokalemia  Assessment & Plan  Increase KCl  Follow-up a.m. BMP, magnesium    Normocytic anemia- (present on admission)  Assessment & Plan  Suspect due to chronic disease in setting of malignancy.  No symptoms of acute bleeding.  Follow-up a.m. hemoglobin.  Transfuse if hemoglobin less than 7.    Bilateral leg edema  Assessment & Plan  Patient with extensive bilateral leg edema with chronic changes with erythema and skin breakdown of the lower extremities.  Echo with preserved LV function EF 55%.  Change to oral Lasix  Monitor renal function, electrolytes, urine output.         VTE prophylaxis: Lovenox    Discussed with RT and multidisciplinary team plan of care.

## 2019-09-25 NOTE — PROGRESS NOTES
[facility] Medicine Daily Progress Note    Date of Service  9/24/2019    Chief Complaint  68 y.o. male admitted 9/22/2019 with worsening shortness of breath.    Hospital Course      68-year-old male history of lung cancer, chronic resp failure follows with Dr. Llanos, no recent chemotherapy, recurrent pneumonia admitted with his third bout of pneumonia.  CT scan showed no PE with noted right lung mass with right lower lobe consolidation and mucous plugging.  Patient initiated on IV antibiotics.  Acute hypoxic respite failure requiring high O2.      Interval Problem Update    Dyspnea and tachypnea with productive cough and congestion.  Hypoxic respiratory requiring high flow nasal cannula with FiO2 40% at 55 L/min.  Reports starting to bring up some phlegm although remains very congested.    Consultants/Specialty  None       Code Status  Full code    Disposition    On high flow nasal cannula, trying to taper down oxygen, to be determined    Review of Systems  Review of Systems   Constitutional: Positive for malaise/fatigue. Negative for chills, diaphoresis and fever.   HENT: Positive for congestion.    Eyes: Negative for discharge and redness.   Respiratory: Positive for cough, sputum production and shortness of breath. Negative for wheezing and stridor.    Cardiovascular: Positive for chest pain (Pleuritic right-sided) and leg swelling. Negative for palpitations.   Gastrointestinal: Negative for abdominal pain, diarrhea and vomiting.   Genitourinary: Negative for flank pain and hematuria.   Musculoskeletal: Negative for back pain, joint pain and neck pain.   Neurological: Negative for tremors, sensory change, speech change, focal weakness and weakness.   Psychiatric/Behavioral: Negative for hallucinations and substance abuse.        Physical Exam  Temp:  [36.4 °C (97.6 °F)-36.9 °C (98.5 °F)] 36.8 °C (98.2 °F)  Pulse:  [103-112] 104  Resp:  [17-20] 20  BP: (108-129)/(65-71) 126/69  SpO2:  [90 %-96 %] 90 %    Physical  Exam   Constitutional: He is oriented to person, place, and time.   Tachypnea with exertion   HENT:   Head: Normocephalic and atraumatic.   Right Ear: External ear normal.   Left Ear: External ear normal.   Nose: Nose normal.   Eyes: Conjunctivae and EOM are normal. No scleral icterus.   Neck: Normal range of motion. No JVD present.   Cardiovascular: Normal rate and regular rhythm.   No murmur heard.  Pulmonary/Chest: No stridor. He is in respiratory distress. He has no wheezes. He has rales.   Diminished breath sounds bases, inspiratory rhonchi   Abdominal: Soft. Bowel sounds are normal. He exhibits no distension. There is no tenderness.   Musculoskeletal: He exhibits edema. He exhibits no tenderness.   Lower extremity, 1-2+ . negative Homans sign   Neurological: He is alert and oriented to person, place, and time. No cranial nerve deficit.   Skin: Skin is warm and dry. He is not diaphoretic. No pallor.   Psychiatric: He has a normal mood and affect. His behavior is normal.   Vitals reviewed.      Fluids    Intake/Output Summary (Last 24 hours) at 9/24/2019 1949  Last data filed at 9/24/2019 1612  Gross per 24 hour   Intake --   Output 700 ml   Net -700 ml       Laboratory  Recent Labs     09/22/19 2032 09/23/19  0752 09/24/19  0053   WBC 14.6* 16.7* 14.9*   RBC 3.14* 2.98* 3.05*   HEMOGLOBIN 9.4* 9.2* 9.0*   HEMATOCRIT 30.2* 29.3* 29.8*   MCV 96.2 98.3* 97.7   MCH 29.9 30.9 29.5   MCHC 31.1* 31.4* 30.2*   RDW 55.4* 54.8* 54.2*   PLATELETCT 320 287 288   MPV 8.9* 8.8* 8.9*     Recent Labs     09/22/19 2032 09/23/19  0412 09/24/19  0053   SODIUM 131* 134* 134*   POTASSIUM 5.1 3.9 3.4*   CHLORIDE 90* 94* 91*   CO2 33 32 35*   GLUCOSE 119* 91 129*   BUN 12 10 10   CREATININE 0.54 0.45* 0.58   CALCIUM 9.8 9.3 9.4     Recent Labs     09/22/19 2032   INR 1.18*               Imaging  EC-ECHOCARDIOGRAM COMPLETE W/O CONT   Final Result      DX-CHEST-PORTABLE (1 VIEW)   Final Result      1.  No significant change      2.   Combination of mass and atelectasis throughout the right hemithorax      CT-CTA CHEST PULMONARY ARTERY W/ RECONS   Final Result         1.  No evidence of pulmonary embolism.   2.  Large right lung mass consistent with history of lung cancer with relatively stable mass effect as detailed including mediastinal compression, encasement/narrowing of right sided bronchi and pulmonary arteries.   3.  There is new mucous plugging in the left lower lobe with new patchy left lower lobe opacities and some mildly increased consolidation in the right lower lobe. This is suspicious for pneumonitis, infection/aspiration.   4.  Pulmonary and adrenal metastases again noted.            US-EXTREMITY VENOUS UPPER UNILAT RIGHT   Final Result      DX-CHEST-PORTABLE (1 VIEW)   Final Result      1.  Large right upper lung mass again noted.   2.  Increase small right pleural effusion.   3.  Right basilar opacity may represent atelectasis, pneumonia and/or malignancy.           Assessment/Plan  * Acute on chronic respiratory failure with hypoxia (HCC)- (present on admission)  Assessment & Plan  Multifactorial due to postobstructive pneumonia with mucous plugs, advanced lung cancer, and atelectatic changes.   IV antibiotics  Bronchodilator therapy  Start CPT, Mucinex  Continue with Mucomyst  Try to obtain sputum culture to direct antibiotic , use continue IV antibiotics  Patient requests no advanced life support.    High flow nasal cannula--at FiO2 40% at 55 L/min--type II titrate down FiO2  Encourage I-S  We will consult pulmonary if not clinically improved  Discussed case  with RT  Monitor cardiac rhythm, BMP and phosphorus level    CAP (community acquired pneumonia)  Assessment & Plan  Post obstructive in setting of malignancy.    Continue RT protocol, Mucomyst, bronchodilator therapy with DuoNeb.  Start chest percussion therapy  Try to obtain sputum culture  Continue IV Zosyn  Consult pulmonary if not clinically improved    Sepsis  (HCC)  Assessment & Plan  This is Sepsis Present on admission with Tachycardia, with heart rate greater than 90 BPM, Tachypnea, with respirations greater than 20 per minute and Leukocyosis, with WBC greater than 12,000 and hypoxia.  Suspect postobstructive versus commutty acquired pneumonia.  Sepsis protocol initiated  Fluid resuscitation ordered per protocol  IV antibiotics as appropriate for source of sepsis            Malignant neoplasm of overlapping sites of right lung (HCC)- (present on admission)  Assessment & Plan  Continue current outpatient management.     Hypokalemia  Assessment & Plan  Start oral KCl  Monitor electrolytes    Normocytic anemia- (present on admission)  Assessment & Plan  Suspect due to chronic disease in setting of malignancy.  No symptoms of acute bleeding.  Monitor hemoglobin and for acute symptoms.  Transfuse if hemoglobin less than 7.    Bilateral leg edema  Assessment & Plan  Patient with extensive bilateral leg edema with chronic changes with erythema and skin breakdown of the lower extremities.  Echo with preserved LV function EF 55%.  Continue IV Lasix diuresis  Monitor renal function, electrolytes, urine output.         VTE prophylaxis: Lovenox    Discussed with multidisciplinary team plan of care.

## 2019-09-25 NOTE — DOCUMENTATION QUERY
[facility]                                                                       Query Response Note      PATIENT:               NUNU BLUE  ACCT #:                  [id number]  MRN:                     [id number]  :                      1950  ADMIT DATE:       2019 7:33 PM  DISCH DATE:          RESPONDING  PROVIDER #:        [id number]           QUERY TEXT:    Malnutrition is documented by the RD in the Medical Record as follows:    Severe malnutrition in the context of acute on chronic disease     NOTE:  If an appropriate response is not listed below, please respond with a new note.    The patient's Clinical Indicators include:   RD consult: Severe malnutrition in the context of acute on chronic disease r/t PNA and lung cancer as evidenced by 3% wt loss in 1 week and moderate muscle wasting (clavicle, deltoid)  Treatment: RD consult, Supplements, Encourage intake of all meals, Document all intake, Monitor wt, Nutrition rep  Risk Factors: pNA, Sepsis, Lung cancer w/mets to ribs, Anemia, Hypoxia  Options provided:   -- Agree with RD assessment: Severe protein calorie malnutrition   -- Disagree with RD assessment   -- Unable to determine      Query created by: Russell Navarro on 2019 12:43 PM    RESPONSE TEXT:    Agree with RD assessment: Severe protein calorie malnutrition       QUERY TEXT:    Please clarify in documentation the relationship, if any, between Sepsis and Acute respiratory failure    The patient's Clinical Indicators include:   MD PN documents:   1. Sepsis due to pna  2. Acute respiratory failure due to post obstructive PNA w/ mucous plugs  Treatment: IV abx, bronchodilator, Mucinex, Mucomyst, RT protocol, Zosyn  Risk Factors: Lung cancer, Chronic respiratory failure, Recurrent PNA, Mucous lugging  Options provided:   -- Acute respiratory failure is due to or associated with Severe sepsis   -- Unrelated  to each other   -- Unable to determine      Query created by: Russell Navarro on 9/25/2019 12:48 PM    RESPONSE TEXT:    Acute on chronic resp failure related to lung cancer and pneumonia. Unable to determine if sepsis.          Electronically signed by:  VITOR COLLADO 9/25/2019 2:29 PM

## 2019-09-25 NOTE — CARE PLAN
Problem: Oxygenation:  Goal: Maintain adequate oxygenation dependent on patient condition  Outcome: PROGRESSING AS EXPECTED   HIGH FLOW NC 40% 40L WILL CONTINUE TO TITRATE AS APPROPRIATE.     Problem: Bronchoconstriction:  Goal: Improve in air movement and diminished wheezing  Outcome: PROGRESSING AS EXPECTED     Problem: Bronchopulmonary Hygiene:  Goal: Increase mobilization of retained secretions  Outcome: PROGRESSING AS EXPECTED     CPT W/ G5 WAND QID  MUCOMYST AND DUONEB W/ AEROGEN QID

## 2019-09-25 NOTE — CARE PLAN
Problem: Safety  Goal: Will remain free from injury  Outcome: PROGRESSING AS EXPECTED  Note:   Bed locked and in lowest position. Bed alarm on. Treaded socks. Call light and belongings within reach. Patient educated to call for assistance. Pt verbalized understanding. Hourly rounding in place.       Problem: Knowledge Deficit  Goal: Knowledge of disease process/condition, treatment plan, diagnostic tests, and medications will improve  Outcome: PROGRESSING AS EXPECTED  Note:   Discussed POC and medications with patient, pt. verbalized understanding.

## 2019-09-25 NOTE — DISCHARGE PLANNING
"Care Transition Team Assessment    LSW met with pt at bedside to complete assessment and discuss discharge planning/barriers. Pt sitting-up  in chair at bedside during assessment. Pt reported goal to return home when medically able. He reported he feels he was discharged home \"too soon\" last time and was only home for 2 days prior to being readmitted to hospital.     Pt is a 68 year old single (never ) male who lives alone in his single story family home on [place] acres. He reported it has six steps to the entrance and indicated he has two hand-rails to navigate up and down the steps. Pt reported prior to his hospital admissions he was doing well and independent with his ADL needs. He reported independence with his iADLs other then driving explaining he stopped driving when prescribed morphine. He reported his friend provides transportation needs. Pt also has home oxygen and nebulizer which he obtained through [employer].     He reported his friend, Jonny of 43 years is his main support. He indicated they talk everyday and see each other every other day, typically. He indicated he has no children and had a brother who has passed away. Pt denied any financial barriers or difficulties affording his basic needs. He explained he receives SSA and has investments reporting he planned well from his senior living and was able to retire at 44 years old. 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     Pt reported he quite smoking 5 months ago both cigarettes and marijuana. He reported he was smoking about a pack of cigarettes a day and 0.5-1.0 gram of marijuana/day. He denied any current alcohol use or other substances. Pt denied SI/HI or any mental health dx/hx.     Pt does have a POLST completed. LSW reviewed with pt to verify information as listed on his POLST: DNR and comfort care, should he no longer be able to make decisions. Pt confirmed information.     Information Source  Orientation : Oriented x 4  Information Given " "By: Patient  Informant's Name: Ladarius   Who is responsible for making decisions for patient? : Patient    Readmission Evaluation  Is this a readmission?: Yes - unplanned readmission  Why do you think you were readmitted?: \"I think I was discharged too soon\"  Was an appointment arranged for you prior to discharge?: No(Had appt but did not make it to appt before readmission )  Were there new prescriptions you were supposed to fill after you were discharged?: Yes, prescriptions filled  Did you understand your discharge instructions?: Yes  Did you have enough support after your last discharge?: Yes    Elopement Risk  Legal Hold: No  Ambulatory or Self Mobile in Wheelchair: Yes  Disoriented: No  Psychiatric Symptoms: None  History of Wandering: No  Elopement this Admit: No  Vocalizing Wanting to Leave: No  Displays Behaviors, Body Language Wanting to Leave: No-Not at Risk for Elopement  Elopement Risk: Not at Risk for Elopement    Interdisciplinary Discharge Planning  Primary Care Physician: Dr. Elizabeth Mosquera  Lives with - Patient's Self Care Capacity: Alone and Able to Care For Self  Patient or legal guardian wants to designate a caregiver (see row info): No  Support Systems: Friends / Neighbors  Housing / Facility: 1 Story House  Mobility Issues: No  Prior Services: Home-Independent  Patient Expects to be Discharged to:: Home   Durable Medical Equipment: Home Oxygen  DME Provider / Phone: Yared     Discharge Preparedness  What is your plan after discharge?: Home with help  What are your discharge supports?: Other (comment)(Friend)  Prior Functional Level: Ambulatory, Independent with Activities of Daily Living, Independent with Medication Management  Difficulity with ADLs: None  Difficulity with IADLs: Driving  Difficulity with IADL Comments: Friend provides transportation     Functional Assesment  Prior Functional Level: Ambulatory, Independent with Activities of Daily Living, Independent with Medication " Management    Finances  Financial Barriers to Discharge: No  Prescription Coverage: Yes    Vision / Hearing Impairment  Vision Impairment : Yes  Right Eye Vision: Impaired, Wears Glasses  Left Eye Vision: Impaired, Wears Glasses  Hearing Impairment : No    Advance Directive  Advance Directive?: POLST    Domestic Abuse  Have you ever been the victim of abuse or violence?: No  Physical Abuse or Sexual Abuse: No  Verbal Abuse or Emotional Abuse: No  Possible Abuse Reported to:: Not Applicable    Psychological Assessment  History of Substance Abuse: None  History of Psychiatric Problems: No    Discharge Risks or Barriers  Discharge risks or barriers?: Complex medical needs    Anticipated Discharge Information  Anticipated discharge disposition: Discharge needs currently unknown  Discharge Address: [id number] [place] [address]  Discharge Contact Phone Number: [phone]

## 2019-09-26 NOTE — PROGRESS NOTES
Received report from Charge Nurse, Hailee, regarding prior 12 hours.  POC reviewed with pt, white board updated, pt verbalized understanding, call light within reach.  Pt encouraged to call before getting up.  Bed in lowest position, treaded slippers on.

## 2019-09-26 NOTE — PROGRESS NOTES
Monitor summary: 0.16/0.08/0.30   Sinus Tachycardia 100 - 120   with rare PVCs, rare and occasional PACs

## 2019-09-26 NOTE — PROGRESS NOTES
Assumed care at 0700, bedside report received from day shift RN. Pt. Is ST on the monitor. Initial assessment completed, orders reviewed, call light within reach, bed alarm is in use, and hourly rounding in place. POC addressed with patient, no additional questions at this time.

## 2019-09-26 NOTE — CARE PLAN
Problem: Safety  Goal: Will remain free from falls  Outcome: PROGRESSING AS EXPECTED     Problem: Infection  Goal: Will remain free from infection  Outcome: PROGRESSING AS EXPECTED     Problem: Venous Thromboembolism (VTW)/Deep Vein Thrombosis (DVT) Prevention:  Goal: Patient will participate in Venous Thrombosis (VTE)/Deep Vein Thrombosis (DVT)Prevention Measures  Outcome: PROGRESSING AS EXPECTED     Problem: Respiratory:  Goal: Respiratory status will improve  Outcome: PROGRESSING AS EXPECTED     Problem: Pain Management  Goal: Pain level will decrease to patient's comfort goal  Outcome: PROGRESSING AS EXPECTED

## 2019-09-26 NOTE — CARE PLAN
Problem: Nutritional:  Goal: Achieve adequate nutritional intake  Description  Patient will consume 50% of meals   Outcome: PROGRESSING SLOWER THAN EXPECTED    Visit with pt, only drinking Boost GC TID (no meals). Pt agreeable to Boost Plus, and adding high protein milkshake QD. Weight trending down. RD following.

## 2019-09-26 NOTE — CONSULTS
"Reason for PC Consult: Advance Care Planning    Consulted by:   Dr. Teresa    Assessment:  General:   68 year old male admitted for CAP on 9/22/19. 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 Pt was discharged home for two days before presenting to the ED with worsening shortness of breath and congestion.     Prior PC Consult: 09/14/19    Dyspnea: Yes, 92% on 35% FiO2 via HFNC  Last BM: 09/22/19    Pain: Yes, Pt reports he is not on his normal pain medication regimen  Depression: Mood appropriate for situation    Dementia: No       Spiritual:  Is Mandaen or spirituality important for coping with this illness? No, Pt declined visit from   Has a  or spiritual provider visit been requested? No    Palliative Performance Scale: 50%    Advance Directive: Advance Directive    DPOA: Yes, Jonny Maddox ([phone])  POLST: Yes      Code Status: DNR/DNI     Outcome:  PC RN visited pt at bedside, pt verbalized remembering this PC RN. Discussed pt's two days at home between hospital admissions. Pt reports his pneumonia got worse and he thought he was dying. Pt didn't feel ready to die at home, however when he got to the hospital pt reports he was feeling so poorly he was \"ready to die\". Pt reports feeling much better now.    Pt expressed concern for his uncontrolled pain, he would like to be moved to the oncology floor. Pt states he is not on his normal pain regimen he is on at home and he is having a significant amount of pain.     Reviewed pt's code status, AD and POLST. Pt reports they all still reflect his wishes.     Discussed hospice at home again, pt states he would be willing to continue hospice discussion after a few more days of treatment. Pt would like to see if he can be weaned off of the HFNC first.     Active listening, education, validation, normalization, therapeutic touch, and emotional support provided throughout " encounter.    Updated:   Dr. Teresa    Plan:   Continue GOC discussion as clinical picture changes, work on weaning HFNC.       Thank you for allowing Palliative Care to participate in this patient's care. Please feel free to call [phone] with any questions or concerns.

## 2019-09-27 NOTE — PROGRESS NOTES
Report received. Patient A&O x 4. Reports pain 8/10 in back.  VS Stable. POC discussed, patient verbalized understanding. Belongings and bedside table within reach. Bed in low and locked positions. Fall precautions in place. Patient educated to call for assistance. Hourly rounding in place. No other needs at this time.

## 2019-09-27 NOTE — PROGRESS NOTES
Monitor Summary:    Rhythm: ST  Rate: 103-113  Ectopy: rare-occasional PVCs, rare PACs  Intervals: .18/.08/.32

## 2019-09-27 NOTE — PROGRESS NOTES
[facility] Medicine Daily Progress Note    Date of Service  9/26/2019    Chief Complaint  68 y.o. male admitted 9/22/2019 with worsening shortness of breath.    Hospital Course      68-year-old male history of lung cancer, chronic resp failure follows with Dr. Llanos, no recent chemotherapy, recurrent pneumonia admitted with his third bout of pneumonia.  CT scan showed no PE with noted right lung mass with right lower lobe consolidation and mucous plugging.  Patient initiated on IV antibiotics.  Acute hypoxic respite failure requiring high O2.      Interval Problem Update    Increased dyspnea with right sided chest pain.  High flow requirements increase from 35 to 50% FiO2.  Patient interested in hospice although is concerned about availability in [place] near where he resides.    Consultants/Specialty  None       Code Status  Full code    Disposition    On high flow nasal cannula, trying to taper down oxygen, to be determined  Plan hospice evaluation  Review of Systems  Review of Systems   Constitutional: Positive for malaise/fatigue. Negative for chills and fever.   HENT: Positive for congestion.    Eyes: Negative for discharge and redness.   Respiratory: Positive for cough, sputum production and shortness of breath. Negative for wheezing and stridor.    Cardiovascular: Positive for chest pain (Pleuritic right-sided) and leg swelling. Negative for palpitations.   Gastrointestinal: Negative for abdominal pain, diarrhea, nausea and vomiting.   Genitourinary: Negative for flank pain and hematuria.   Musculoskeletal: Negative for back pain, joint pain and neck pain.   Skin: Negative for itching and rash.   Neurological: Negative for tremors, sensory change, speech change, focal weakness and weakness.   Psychiatric/Behavioral: Negative for hallucinations, memory loss and substance abuse.        Physical Exam  Temp:  [36.5 °C (97.7 °F)-37.2 °C (98.9 °F)] 36.8 °C (98.2 °F)  Pulse:  [102-113] 108  Resp:  [17-24] 18  BP:  (105-130)/(59-66) 109/63  SpO2:  [92 %-98 %] 94 %    Physical Exam   Constitutional: He is oriented to person, place, and time. He appears distressed.   HENT:   Head: Normocephalic and atraumatic.   Eyes: Conjunctivae and EOM are normal. No scleral icterus.   Neck: Normal range of motion. No JVD present.   Cardiovascular: Normal rate and regular rhythm.   No murmur heard.  Pulmonary/Chest: No stridor. He is in respiratory distress. He has no wheezes. He has rales. He exhibits tenderness.   Tachypnea with mild exertion.  Diminished breath sounds right mid to lower lung fields compared to left.  Sporadic inspiratory rhonchi.  Right chest wall tenderness to palpation.   Abdominal: Soft. He exhibits no distension. There is no tenderness.   Musculoskeletal: He exhibits edema. He exhibits no tenderness.   Lower extremity, 1-2+ . negative Homans sign   Neurological: He is alert and oriented to person, place, and time. No cranial nerve deficit.   No gross focal weakness   Skin: Skin is warm and dry. He is not diaphoretic. No pallor.   Psychiatric:   Calm, cooperative   Vitals reviewed.      Fluids    Intake/Output Summary (Last 24 hours) at 9/26/2019 1739  Last data filed at 9/26/2019 1700  Gross per 24 hour   Intake 240 ml   Output 575 ml   Net -335 ml       Laboratory  Recent Labs     09/24/19 0053 09/24/19 2342 09/26/19  0013   WBC 14.9* 12.0* 12.9*   RBC 3.05* 3.25* 3.35*   HEMOGLOBIN 9.0* 9.9* 10.1*   HEMATOCRIT 29.8* 31.6* 32.9*   MCV 97.7 97.2 98.2*   MCH 29.5 30.5 30.1   MCHC 30.2* 31.3* 30.7*   RDW 54.2* 54.0* 53.5*   PLATELETCT 288 300 327   MPV 8.9* 8.9* 9.2     Recent Labs     09/24/19 0053 09/24/19 2342 09/26/19  0013   SODIUM 134* 135 132*   POTASSIUM 3.4* 3.2* 3.1*   CHLORIDE 91* 89* 86*   CO2 35* 37* 37*   GLUCOSE 129* 115* 118*   BUN 10 10 12   CREATININE 0.58 0.52 0.47*   CALCIUM 9.4 10.1 10.0                   Imaging  DX-CHEST-PORTABLE (1 VIEW)   Final Result      1.  No significant change      2.   Combination of mass and atelectasis throughout the right hemithorax      EC-ECHOCARDIOGRAM COMPLETE W/O CONT   Final Result      DX-CHEST-PORTABLE (1 VIEW)   Final Result      1.  No significant change      2.  Combination of mass and atelectasis throughout the right hemithorax      CT-CTA CHEST PULMONARY ARTERY W/ RECONS   Final Result         1.  No evidence of pulmonary embolism.   2.  Large right lung mass consistent with history of lung cancer with relatively stable mass effect as detailed including mediastinal compression, encasement/narrowing of right sided bronchi and pulmonary arteries.   3.  There is new mucous plugging in the left lower lobe with new patchy left lower lobe opacities and some mildly increased consolidation in the right lower lobe. This is suspicious for pneumonitis, infection/aspiration.   4.  Pulmonary and adrenal metastases again noted.            US-EXTREMITY VENOUS UPPER UNILAT RIGHT   Final Result      DX-CHEST-PORTABLE (1 VIEW)   Final Result      1.  Large right upper lung mass again noted.   2.  Increase small right pleural effusion.   3.  Right basilar opacity may represent atelectasis, pneumonia and/or malignancy.           Assessment/Plan  * Acute on chronic respiratory failure with hypoxia (HCC)- (present on admission)  Assessment & Plan  Multifactorial due to postobstructive pneumonia with mucous plugs, advanced lung cancer, and atelectatic changes.   Bronchodilator therapy  Continue CPT, Mucinex, Mucomyst   Continue IV Zosyn.  Encourage I-S  Discussed case  with RT  Telemetry monitoring.  Increasing FiO2 requirements--discussed with Dr. Nichols, pulmonary to consult.     CAP (community acquired pneumonia)  Assessment & Plan  Post obstructive in setting of malignancy with mucous plugs seen and left lung.  Try to mobilize secretions with Mucomyst, scheduled Mucinex, CPT.  bronchodilator therapy with DuoNeb.  Previous sputum culture positive Klebsiella-continue IV  Zosyn        Sepsis (HCC)  Assessment & Plan  This is Sepsis Present on admission with Tachycardia, with heart rate greater than 90 BPM, Tachypnea, with respirations greater than 20 per minute and Leukocyosis, with WBC greater than 12,000 and hypoxia.  Suspect postobstructive versus commutty acquired pneumonia.  Sepsis protocol initiated  Fluid resuscitation ordered per protocol  Previous sputum positive for Klebsiella.  -- WBC declined, afebrile - continue IV Zosyn            Malignant neoplasm of overlapping sites of right lung (HCC)- (present on admission)  Assessment & Plan  Right sided pleuritic chest pain due to malignancy.  Continue scheduled MS Contin, Neurontin, PRN oxycodone, IV morphine.  Palliative care consultation.    Hypokalemia  Assessment & Plan  Worsening.  Increase KCl  Monitor serum potassium and magnesium    Normocytic anemia- (present on admission)  Assessment & Plan  Suspect due to chronic disease in setting of malignancy.  No symptoms of acute bleeding.  Follow-up a.m. hemoglobin.  Transfuse if hemoglobin less than 7.    Bilateral leg edema  Assessment & Plan  Patient with extensive bilateral leg edema with chronic changes with erythema and skin breakdown of the lower extremities.  Echo with preserved LV function EF 55%.  Continue Lasix  Monitor renal function, electrolytes, urine output.         VTE prophylaxis: Lovenox      Discussed with pulmonary and multidisciplinary team plan of care.- will consult hospice.

## 2019-09-27 NOTE — CARE PLAN
Problem: Communication  Goal: The ability to communicate needs accurately and effectively will improve  9/26/2019 2041 by Windy Sher RAleeN.  Outcome: PROGRESSING AS EXPECTED  Note:   Patient oriented to room and surroundings. Patient educated to communicate needs accurately and effectively. Patient encouraged to vocalize questions and concerns regarding POC. No concerns or questions noted at this time.     9/26/2019 2039 by Windy Sher R.N.  Outcome: PROGRESSING AS EXPECTED  Note:   Patient oriented to room and surroundings. Patient educated to communicate needs accurately and effectively. Patient encouraged to vocalize questions and concerns regarding POC. No concerns or questions noted at this time.        Problem: Infection  Goal: Will remain free from infection  9/26/2019 2041 by Windy Sher R.N.  Outcome: PROGRESSING AS EXPECTED  Note:   Patient will remain free from infection to prevent further complications. Patient educated on the importance of routine hand hygiene to help prevent the spread of infection.    9/26/2019 2039 by Windy Sher R.N.  Outcome: PROGRESSING AS EXPECTED  Note:   Patient will remain free from infection to prevent further complications. Patient educated on the importance of routine hand hygiene to help prevent the spread of infection.

## 2019-09-27 NOTE — PALLIATIVE CARE
Palliative Care follow-up  PC RN visited pt at bedside, discussed pt's O2 requirements increasing. Pt expressed concern that hospice and comfort care is the next step.     PC RN discussed hospice referral to [facility] hospice for evaluation. Discussed PC MSW is looking into hospice services in [facility]. However, home hospice cannot accommodate pt's HFNC. Pt verbalized understanding. He will think about talking to [facility] hospice today and let PC RN know if he would like a referral sent.     Pt reports his pain is better today, he is close to his home routine for pain medicine.     Active listening, education, validation, normalization, therapeutic touch, and emotional support provided.     Updated:   PC Team    Plan:   Pt considering hospice referral if O2 needs do not improve.       Thank you for allowing Palliative Care to participate in this patient's care. Please feel free to call [phone] with any questions or concerns.

## 2019-09-27 NOTE — DISCHARGE PLANNING
Anticipated Discharge Disposition:   · Hospice     Action:   · Per attending, Dr. Teresa in discharge rounds pt interested in hospice care at this time. Attending would like pt to be evaluated for General Inpatient Hospice.   · Pt did meet with Palliative care and reviewed end-of-life care/process.   · LSW met with pt at bedside to discuss options. He reported he is also interested to determine if he would qualify for in-patient hospice services. CHOICE from completed by pt selecting [facility]. Pt reported he would like his friend, Don to be updated frequently on process.   · CHOICE form faxed to [facility] ext [phone].    Barriers to Discharge:   · Determining plan of care for hospice and acceptance ie: inpatient hospice vs group home/assisted or SNF with hospice vs home with hospice     Plan:   · Care coordination will continue to follow up with discharge barriers and coordination with medical team.

## 2019-09-27 NOTE — CARE PLAN
Problem: Communication  Goal: The ability to communicate needs accurately and effectively will improve  Outcome: PROGRESSING AS EXPECTED  Note:   Patient oriented to room and surroundings. Patient educated to communicate needs accurately and effectively. Patient encouraged to vocalize questions and concerns regarding POC. No concerns or questions noted at this time.        Problem: Infection  Goal: Will remain free from infection  Outcome: PROGRESSING AS EXPECTED  Note:   Patient will remain free from infection to prevent further complications. Patient educated on the importance of routine hand hygiene to help prevent the spread of infection.

## 2019-09-27 NOTE — CARE PLAN
Problem: Safety  Goal: Will remain free from injury  Outcome: PROGRESSING AS EXPECTED  Goal: Will remain free from falls  Outcome: PROGRESSING AS EXPECTED     Problem: Venous Thromboembolism (VTW)/Deep Vein Thrombosis (DVT) Prevention:  Goal: Patient will participate in Venous Thrombosis (VTE)/Deep Vein Thrombosis (DVT)Prevention Measures  Outcome: PROGRESSING AS EXPECTED     Problem: Fluid Volume:  Goal: Will maintain balanced intake and output  Outcome: PROGRESSING AS EXPECTED     Problem: Pain Management  Goal: Pain level will decrease to patient's comfort goal  Outcome: PROGRESSING AS EXPECTED     Problem: Respiratory:  Goal: Respiratory status will improve  Outcome: PROGRESSING SLOWER THAN EXPECTED

## 2019-09-27 NOTE — CONSULTS
Pulmonary Consultation       Patient ID:   Name:             Ladarius Khan   YOB: 1950  Age:                 68 y.o.  male   MRN:               [id number]                                                  Reason of Consult:  SOB    Requesting physician:  Dr. Teresa    History of Present Illness:  68-year-old male with a past medical history significant for lung cancer with large pulmonary mass and chronic respiratory failure, patient follows with Dr. Llanos, patient presents due to shortness of breath.  Patient states that he has finished his first round of chemotherapy and was set to undergo his second round, however patient was not able to start second round due to pneumonia and recurrent bouts with shortness of breath.    We are consulted due to patient's increased oxygen requirements, patient is currently on 60 L high flow nasal cannula.    Upon walking into speak to patient, he states that he is in the process of considering hospice.    ROS:  Complete ROS was done and was negative except what mentioned in HPI     Past Medical History:  Past Medical History:   Diagnosis Date   • Breath shortness    • Cancer (HCC) 2008    bladder    • Lung cancer (HCC)    • Pain     pain around rib cage to back    • Sinus infection 03/29/2019    nasal congestion, chest congestion        Past surgical history:  Past Surgical History:   Procedure Laterality Date   • TRANS URETHRAL RESECTION BLADDER  5/28/08    Performed by RASHARD TANNER at [facility]S   • APPENDECTOMY     • TONSILLECTOMY         Family History:  Family History   Problem Relation Age of Onset   • No Known Problems Mother    • Lung Cancer Father         smoker   • Diabetes Neg Hx    • Heart Disease Neg Hx    • Stroke Neg Hx    • Drug abuse Neg Hx    • Alcohol abuse Neg Hx        Hospital Medications:    Current Facility-Administered Medications:   •  acetylcysteine (MUCOMYST) 20 % solution 3 mL, 3 mL, Inhalation, 4X/DAY (RT), Rene  SAWYER Teresa M.D., 3 mL at 09/27/19 1000  •  ipratropium-albuterol (DUONEB) nebulizer solution, 3 mL, Nebulization, 4X/DAY (RT), Rene Teresa M.D., 3 mL at 09/27/19 1000  •  potassium chloride SA (Kdur) tablet 40 mEq, 40 mEq, Oral, BID, Rene Teresa M.D., 40 mEq at 09/27/19 0456  •  furosemide (LASIX) tablet 20 mg, 20 mg, Oral, Q DAY, Rene Teresa M.D., 20 mg at 09/27/19 0456  •  guaiFENesin LA (MUCINEX) tablet 1,200 mg, 1,200 mg, Oral, Q12HRS, Rene Teresa M.D., 1,200 mg at 09/27/19 0456  •  [COMPLETED] piperacillin-tazobactam (ZOSYN) 3.375 g in  mL IVPB, 3.375 g, Intravenous, Once, Stopped at 09/23/19 1259 **AND** piperacillin-tazobactam (ZOSYN) 3.375 g in  mL IVPB, 3.375 g, Intravenous, Q8HRS, Leonie Martinez M.D., Last Rate: 25 mL/hr at 09/27/19 0806, 3.375 g at 09/27/19 0806  •  Respiratory Care per Protocol, , Nebulization, Continuous RT, Leonie Martinez M.D.  •  ipratropium-albuterol (DUONEB) nebulizer solution, 3 mL, Nebulization, Q4H PRN (RT), Goldy Colón M.D., 3 mL at 09/23/19 1415  •  gabapentin (NEURONTIN) capsule 300 mg, 300 mg, Oral, TID, Regan Childress M.D., 300 mg at 09/27/19 1137  •  morphine ER (MS CONTIN) tablet 60 mg, 60 mg, Oral, Q12HRS, Regan Childress M.D., 60 mg at 09/27/19 0937  •  acetaminophen (TYLENOL) tablet 650 mg, 650 mg, Oral, Q6HRS PRN, Regan Childress M.D.  •  Notify provider if pain remains uncontrolled, , , CONTINUOUS **AND** Use the numeric rating scale (NRS-11) on regular floors and Critical-Care Pain Observation Tool (CPOT) on ICUs/Trauma to assess pain, , , CONTINUOUS **AND** Pulse Ox (Oximetry), , , CONTINUOUS **AND** Pharmacy Consult Request ...Pain Management Review 1 Each, 1 Each, Other, PHARMACY TO DOSE **AND** If patient difficult to arouse and/or has respiratory depression, stop any opiates that are currently infusing and call a Rapid Response., , , CONTINUOUS **AND** oxyCODONE immediate-release (ROXICODONE) tablet 2.5 mg, 2.5 mg, Oral, Q3HRS PRN **AND**  oxyCODONE immediate-release (ROXICODONE) tablet 5 mg, 5 mg, Oral, Q3HRS PRN, 5 mg at 09/27/19 1137 **AND** morphine (pf) 4 mg/ml injection 2 mg, 2 mg, Intravenous, Q3HRS PRN, Regan Childress M.D., 2 mg at 09/26/19 1401  •  ondansetron (ZOFRAN) syringe/vial injection 4 mg, 4 mg, Intravenous, Q4HRS PRN, Regan Childress M.D.  •  ondansetron (ZOFRAN ODT) dispertab 4 mg, 4 mg, Oral, Q4HRS PRN, Regan Childress M.D.  •  senna-docusate (PERICOLACE or SENOKOT S) 8.6-50 MG per tablet 2 Tab, 2 Tab, Oral, BID, 2 Tab at 09/27/19 0456 **AND** polyethylene glycol/lytes (MIRALAX) PACKET 1 Packet, 1 Packet, Oral, QDAY PRN **AND** magnesium hydroxide (MILK OF MAGNESIA) suspension 30 mL, 30 mL, Oral, QDAY PRN **AND** bisacodyl (DULCOLAX) suppository 10 mg, 10 mg, Rectal, QDAY PRN, Regan Childress M.D.  •  enoxaparin (LOVENOX) inj 40 mg, 40 mg, Subcutaneous, DAILY, Regan Childress M.D., 40 mg at 09/27/19 0456    Current Outpatient Medications:  Medications Prior to Admission   Medication Sig Dispense Refill Last Dose   • amoxicillin-clavulanate (AUGMENTIN) 875-125 MG Tab Take 1 Tab by mouth 2 times a day. 2 days, started 9/19/19 9/22/2019 at am   • furosemide (LASIX) 20 MG Tab Take 1 Tab by mouth every day. 30 Tab 3 9/22/2019 at am   • albuterol (VENTOLIN HFA) 108 (90 Base) MCG/ACT Aero Soln inhalation aerosol Inhale 2 Puffs by mouth every four hours as needed for Shortness of Breath. 8.5 g 3 9/22/2019 at am   • ipratropium-albuterol (DUONEB) 0.5-2.5 (3) MG/3ML nebulizer solution 3 mL by Nebulization route every four hours as needed. 30 Bullet 1 9/22/2019 at unknown   • fluticasone (FLONASE) 50 MCG/ACT nasal spray Spray 1 Spray in nose every day. 16 g 0 9/22/2019 at am   • guaiFENesin LA (MUCINEX) 600 MG TABLET SR 12 HR Take 1 Tab by mouth every 12 hours for 14 days. 28 Tab 0 9/22/2019 at am   • morphine ER (MS CONTIN) 60 MG Tab CR tablet Take 60 mg by mouth every 12 hours.   9/22/2019 at 1900   • gabapentin (NEURONTIN) 300 MG Cap  "Take 300 mg by mouth 3 times a day.  0 9/22/2019 at unknown   • HYDROcodone-acetaminophen (NORCO) 7.5-325 MG per tablet Take 1 Tab by mouth every four hours as needed for Moderate Pain.  0 9/22/2019 at 1900       Medication Allergy:  No Known Allergies          Objective:   Vitals/ General Appearance:   Weight/BMI: Body mass index is 21.4 kg/m².  /60   Pulse (!) 108   Temp 36.8 °C (98.2 °F) (Temporal)   Resp 20   Ht 1.93 m (6' 3.98\")   Wt 79.7 kg (175 lb 11.3 oz)   SpO2 90%   Vitals:    09/27/19 0428 09/27/19 0648 09/27/19 0815 09/27/19 1002   BP: 109/67  104/60    Pulse: (!) 103 (!) 103 (!) 102 (!) 108   Resp: 18 20 16 20   Temp: 36.1 °C (96.9 °F)  36.8 °C (98.2 °F)    TempSrc: Temporal  Temporal    SpO2: 92% 96% 99% 90%   Weight:       Height:         Oxygen Therapy:  Pulse Oximetry: 90 %    Constitutional:   Well developed, Well nourished, respiratory distress  HENMT:  Normocephalic, Atraumatic, Oropharynx moist mucous membranes, No oral exudates, Nose normal.  No thyromegaly.  Eyes:  EOMI, Conjunctiva normal, No discharge.  Neck:  Normal range of motion, No cervical tenderness,  no JVD.  Cardiovascular:  Normal heart rate, Normal rhythm, No murmurs, No rubs, No gallops.   Extremitites with intact distal pulses, no cyanosis, or edema.  Lungs:  Coarse breath sounds  Abdomen: Bowel sounds normal, Soft, No tenderness, No guarding, No rebound, No masses, No hepatosplenomegaly.  Skin: Warm, Dry, No erythema, No rash, no induration.  Neurologic: Alert & oriented x 3, No focal deficits noted, cranial nerves II through X are grossly intact.  Psychiatric: Affect normal, Judgment normal, Mood normal.  Extremities: Patient has lower extremity swelling and right arm swelling      Labs:  Recent Labs     09/24/19  2342 09/26/19  0013 09/27/19  0005   WBC 12.0* 12.9* 11.1*   RBC 3.25* 3.35* 3.13*   HEMOGLOBIN 9.9* 10.1* 9.2*   HEMATOCRIT 31.6* 32.9* 30.5*   MCV 97.2 98.2* 97.4   MCH 30.5 30.1 29.4   MCHC 31.3* 30.7* " 30.2*   RDW 54.0* 53.5* 53.3*   PLATELETCT 300 327 300   MPV 8.9* 9.2 9.6                 Recent Labs     09/24/19  2342 09/26/19  0013 09/27/19  0005   SODIUM 135 132* 133*   POTASSIUM 3.2* 3.1* 3.4*   CHLORIDE 89* 86* 89*   CO2 37* 37* 37*   GLUCOSE 115* 118* 109*   BUN 10 12 13     Recent Labs     09/24/19  2342 09/26/19  0013 09/27/19  0005   SODIUM 135 132* 133*   POTASSIUM 3.2* 3.1* 3.4*   CHLORIDE 89* 86* 89*   CO2 37* 37* 37*   BUN 10 12 13   CREATININE 0.52 0.47* 0.49*   MAGNESIUM  --   --  1.6   PHOSPHORUS 3.3  --   --    CALCIUM 10.1 10.0 9.5     No results found for this or any previous visit.      Imaging:   DX-CHEST-PORTABLE (1 VIEW)   Final Result      1.  No significant change      2.  Combination of mass and atelectasis throughout the right hemithorax      EC-ECHOCARDIOGRAM COMPLETE W/O CONT   Final Result      DX-CHEST-PORTABLE (1 VIEW)   Final Result      1.  No significant change      2.  Combination of mass and atelectasis throughout the right hemithorax      CT-CTA CHEST PULMONARY ARTERY W/ RECONS   Final Result         1.  No evidence of pulmonary embolism.   2.  Large right lung mass consistent with history of lung cancer with relatively stable mass effect as detailed including mediastinal compression, encasement/narrowing of right sided bronchi and pulmonary arteries.   3.  There is new mucous plugging in the left lower lobe with new patchy left lower lobe opacities and some mildly increased consolidation in the right lower lobe. This is suspicious for pneumonitis, infection/aspiration.   4.  Pulmonary and adrenal metastases again noted.            US-EXTREMITY VENOUS UPPER UNILAT RIGHT   Final Result      DX-CHEST-PORTABLE (1 VIEW)   Final Result      1.  Large right upper lung mass again noted.   2.  Increase small right pleural effusion.   3.  Right basilar opacity may represent atelectasis, pneumonia and/or malignancy.              Assessment and Plan:  Principal Problem:    Acute on  chronic respiratory failure with hypoxia (HCC) POA: Yes  Active Problems:    Malignant neoplasm of overlapping sites of right lung (HCC) POA: Yes      Overview: Following with oncology and pulmonology      Pain management: MS contin 45 mg bid, gabapentin 600 mg tid, norco prn,       acetaminophen prn      For sob: duoneb prn, albuterol prn     Sepsis (HCC) POA: Unknown    CAP (community acquired pneumonia) POA: Unknown    Bilateral leg edema POA: Unknown      Overview: Furosemide + potassium    Normocytic anemia POA: Yes    Hypokalemia POA: Unknown  Resolved Problems:    * No resolved hospital problems. *    68-year-old male with lung cancer with large pulmonary mass.  Upon chart review it was noted that patient was referred for palliative care and after discussion with patient patient mentions that he is considering hospice.  Therefore, we recommend continuation of antibiotics with chest physiotherapy.  We will sign off due to patient going to hospice, please feel free to call us if this changes.  Thank you

## 2019-09-27 NOTE — PALLIATIVE CARE
Palliative Care follow-up  Received call from Don, updated him on conversation with pt this morning. He expressed concern about pt going home, even with hospice. He fears pt's symptoms would not be managed at home well. PC RN explained if pt is unable to wean oxygen down he would not be able to accomodate that amount of O2 outside the hospital. Don verbalized agreement with having [facility] evaluate pt for services, he will call pt and tell him also about having referral sent.     Active listening, education, validation, normalization, therapeutic touch, and emotional support provided.     Updated:   PC Team    Plan:   Continue GOC discuss as clinical picture evolves.         Thank you for allowing Palliative Care to participate in this patient's care. Please feel free to call [phone] with any questions or concerns.

## 2019-09-27 NOTE — PALLIATIVE CARE
"Palliative Care follow-up  PC RN visited pt at bedside, discussed his choice to focus on comfort, pt expressed he has suffered long enough and his breathing hasn't improved. Pt is awaiting a hospice eval from [facility]. Pt requested PC RN to call Don to update him on POC.    Called Don, updated him on above. He expressed once pt has  he is not legally obligated to do anything further for him. He knows pt wants to be cremated and when he spoke to the mortuary who also said he isn't legally obligated to do anything further. He is not \"sentimental about a dead body\". Don stated if he is legally obligated to do anything further he will just let him know.    Don states he will maybe visit tomorrow.     Active listening, education, validation, normalization, therapeutic touch, and emotional support provided.     Updated:   TRENTON Perez    Plan:   [facility] to evaluate pt for services.     Recommendations: I recommend a hospice consult.    Thank you for allowing Palliative Care to participate in this patient's care. Please feel free to call [phone] with any questions or concerns.  "

## 2019-09-27 NOTE — PROGRESS NOTES
Discharge instructions reviewed with patient. IV and cardiac monitor removed. Belongings in hand. Knows to  new prescriptions at designated pharmacy. Leaving hospital with portable O2, home supply to be delivered by [facility] [place] today. Patient transported off unit in wheelchair to meet friend at [employer] parking. Leaving in stable condition, no further questions/concerns noted at time of discharge.

## 2019-09-27 NOTE — CARE PLAN
Problem: Bronchopulmonary Hygiene:  Goal: Increase mobilization of retained secretions  Note:   CPT QID w/ wand   Mucomyst QID     Problem: Bronchoconstriction:  Goal: Improve in air movement and diminished wheezing  Note:   DUO QID     Problem: Oxygenation:  Goal: Maintain adequate oxygenation dependent on patient condition  Note:   HHFNC increase to 55lpm and 50%

## 2019-09-27 NOTE — DISCHARGE PLANNING
Received Choice form at 0169  Agency/Facility Name: [facility]  Referral sent per Choice form @ 0673

## 2019-09-28 NOTE — CARE PLAN
Problem: Communication  Goal: The ability to communicate needs accurately and effectively will improve  Outcome: PROGRESSING AS EXPECTED  Note:   Pt communicating needs appropriately. Oriented to the call light and to call for assistance.        Problem: Safety  Goal: Will remain free from falls  Outcome: PROGRESSING AS EXPECTED  Note:   Safety precautions reviewed with pt, pt verbalized understanding and denies questions.  Pt demonstrated ability to use call light for assistance.

## 2019-09-28 NOTE — CARE PLAN
Problem: Oxygenation:  Goal: Maintain adequate oxygenation dependent on patient condition  Note:   HHFNC 50% on 60 LPM        Problem: Bronchoconstriction:  Goal: Improve in air movement and diminished wheezing  Note:   Duo QID          Problem: Bronchopulmonary Hygiene:  Goal: Increase mobilization of retained secretions  Note:   Mucomyst QID   CPT with wand QID

## 2019-09-28 NOTE — CARE PLAN
Problem: Respiratory:  Goal: Respiratory status will improve  Intervention: Assess and monitor pulmonary status  Note:   Respiratory status assessed. Education provided to patient regarding pulmonary hygiene. Changes reported to RT.     Problem: Knowledge Deficit  Goal: Knowledge of disease process/condition, treatment plan, diagnostic tests, and medications will improve  Intervention: Explain information regarding disease process/condition, treatment plan, diagnostic tests, and medications and document in education  Note:   Patient educated on plan of care, pain management, high flow nasal cannula, and the definition of hospice. All questions answered at this time.

## 2019-09-28 NOTE — PALLIATIVE CARE
Palliative Care follow-up  PC RN met pt and friend Don at bedside. Pt expressed after talking with Don and getting updates from Dr. Crandall, he feels ready to start comfort focused care now. Pt requested to have [facility] hosp[facility] liaison come back to start the paperwork. Friend Don stated pt would like to go to the oncology floor if possible.    Notified pt of decision to move forward with comfort focused care and hospice services.    Active listening, education, validation, normalization, therapeutic touch, and emotional support provided.     Updated:   Dr. crandall    Plan:   [facility] to come complete paperwork       Thank you for allowing Palliative Care to participate in this patient's care. Please feel free to call [phone] with any questions or concerns.

## 2019-09-28 NOTE — PROGRESS NOTES
Hospital Medicine Daily Progress Note    Date of Service  9/28/2019    Chief Complaint  68 y.o. male admitted 9/22/2019 with worsening shortness of breath.    Hospital Course      68-year-old male history of lung cancer, chronic resp failure follows with Dr. Llanos, no recent chemotherapy, recurrent pneumonia admitted with his third bout of pneumonia.  CT scan showed no PE with noted right lung mass with right lower lobe consolidation and mucous plugging.  Patient initiated on IV antibiotics.  Acute hypoxic respite failure requiring high O2.      Interval Problem Update    Patient requires continued high flow.  I had at length discussion with patient his current CODE STATUS, nature of his end-stage disease, terminal lung cancer.  He states he wants to be comfortable only and after further thoughts would like to pursue hospice.  Understands that we will remove his high flow with transition to hospice.  Patient would like to proceed.  Will change to comfort care measures per patient's wishes.  Discussed with palliative care who has also discussed with his close friend and appears comfortable with decision.     Advance care planning time consent 20 minutes.    Consultants/Specialty  Palliative care      Code Status  DNR/DNI    Disposition    Plan to hospice    Review of Systems  Review of Systems   Constitutional: Positive for malaise/fatigue. Negative for chills and fever.   HENT: Positive for congestion.    Respiratory: Positive for cough, sputum production and shortness of breath. Negative for wheezing and stridor.    Cardiovascular: Positive for chest pain (Pleuritic right-sided) and leg swelling. Negative for palpitations.   Gastrointestinal: Negative for abdominal pain, diarrhea, nausea and vomiting.   Genitourinary: Negative for flank pain and hematuria.   Musculoskeletal: Negative for back pain, joint pain and neck pain.   Neurological: Positive for weakness. Negative for dizziness, speech change, focal weakness  and headaches.   Psychiatric/Behavioral: Negative for hallucinations.        Physical Exam  Temp:  [36.3 °C (97.4 °F)-37.1 °C (98.8 °F)] 36.8 °C (98.3 °F)  Pulse:  [103-115] 112  Resp:  [16-22] 22  BP: ()/(59-72) 99/59  SpO2:  [92 %-100 %] 96 %    Physical Exam   Constitutional: He is oriented to person, place, and time. He appears distressed.   Tachypneic, alert, appropriate oriented   HENT:   Head: Normocephalic and atraumatic.   Eyes: Conjunctivae and EOM are normal. No scleral icterus.   Neck: Normal range of motion.   Cardiovascular: Normal rate and regular rhythm.   No murmur heard.  Pulmonary/Chest: No stridor. He is in respiratory distress. He has no wheezes. He has rales. He exhibits tenderness.   Diminished breath sounds right mid to lower lung fields compared to left.  Sporadic inspiratory rhonchi.  Right chest wall tenderness to palpation.   Abdominal: Soft. He exhibits no distension. There is no tenderness.   Musculoskeletal: He exhibits no edema or tenderness.   Neurological: He is alert and oriented to person, place, and time.   No gross focal weakness   Skin: Skin is warm and dry. He is not diaphoretic. No pallor.   Vitals reviewed.      Fluids    Intake/Output Summary (Last 24 hours) at 9/28/2019 1428  Last data filed at 9/28/2019 1200  Gross per 24 hour   Intake 271.67 ml   Output 1280 ml   Net -1008.33 ml       Laboratory  Recent Labs     09/26/19  0013 09/27/19  0005 09/28/19  0237   WBC 12.9* 11.1* 11.5*   RBC 3.35* 3.13* 3.17*   HEMOGLOBIN 10.1* 9.2* 9.3*   HEMATOCRIT 32.9* 30.5* 31.0*   MCV 98.2* 97.4 97.8   MCH 30.1 29.4 29.3   MCHC 30.7* 30.2* 30.0*   RDW 53.5* 53.3* 52.7*   PLATELETCT 327 300 280   MPV 9.2 9.6 9.5     Recent Labs     09/26/19  0013 09/27/19  0005   SODIUM 132* 133*   POTASSIUM 3.1* 3.4*   CHLORIDE 86* 89*   CO2 37* 37*   GLUCOSE 118* 109*   BUN 12 13   CREATININE 0.47* 0.49*   CALCIUM 10.0 9.5                   Imaging  DX-CHEST-PORTABLE (1 VIEW)   Final Result       1.  No significant change      2.  Combination of mass and atelectasis throughout the right hemithorax      EC-ECHOCARDIOGRAM COMPLETE W/O CONT   Final Result      DX-CHEST-PORTABLE (1 VIEW)   Final Result      1.  No significant change      2.  Combination of mass and atelectasis throughout the right hemithorax      CT-CTA CHEST PULMONARY ARTERY W/ RECONS   Final Result         1.  No evidence of pulmonary embolism.   2.  Large right lung mass consistent with history of lung cancer with relatively stable mass effect as detailed including mediastinal compression, encasement/narrowing of right sided bronchi and pulmonary arteries.   3.  There is new mucous plugging in the left lower lobe with new patchy left lower lobe opacities and some mildly increased consolidation in the right lower lobe. This is suspicious for pneumonitis, infection/aspiration.   4.  Pulmonary and adrenal metastases again noted.            US-EXTREMITY VENOUS UPPER UNILAT RIGHT   Final Result      DX-CHEST-PORTABLE (1 VIEW)   Final Result      1.  Large right upper lung mass again noted.   2.  Increase small right pleural effusion.   3.  Right basilar opacity may represent atelectasis, pneumonia and/or malignancy.           Assessment/Plan  * Malignant neoplasm of overlapping sites of right lung (HCC)- (present on admission)  Assessment & Plan  Worsening  severe dyspnea, severe pain from terminal lung cancer-plan change to comfort care per patient's wishes.  Provide PRN IV morphine, Ativan for anxiety, atropine as needed.  Initiate comfort care protocols.  Discussed with palliative care.  Transferred to oncology floor.    CAP (community acquired pneumonia)  Assessment & Plan  Post obstructive in setting of malignancy with mucous plugs seen and left lung.  No fevers, white cell count declined.  Continue with Mucomyst, Mucinex, CPT to try to mobilize secretions, bronchodilator therapy  Previous sputum culture positive Klebsiella.  Antibiotics to  be stopped with transition to comfort care        Sepsis (HCC)  Assessment & Plan  This is Sepsis Present on admission with Tachycardia, with heart rate greater than 90 BPM, Tachypnea, with respirations greater than 20 per minute and Leukocyosis, with WBC greater than 12,000 and hypoxia.  Suspect postobstructive versus commutty acquired pneumonia.  Sepsis protocol initiated  Fluid resuscitation ordered per protocol  Previous sputum positive for Klebsiella.  No new sputum cultures obtainable.  Plan DC IV antibiotics as transition to hospice.            Acute on chronic respiratory failure with hypoxia (HCC)- (present on admission)  Assessment & Plan  Multifactorial due to postobstructive pneumonia with mucous plugs, advanced lung cancer, and atelectatic changes-clinically worsening.  Able to wean off high flow nasal cannula.  Plan to DC high flow with change to comfort care.  Discussed with patient.  Provide pain, dyspnea control with IV narcotics.      Hypokalemia  Assessment & Plan  Plan change to comfort care    Normocytic anemia- (present on admission)  Assessment & Plan  Suspect due to chronic disease in setting of malignancy.  No symptoms of acute bleeding.  Follow-up hemoglobin stable.    Bilateral leg edema  Assessment & Plan  Patient with extensive bilateral leg edema with chronic changes with erythema and skin breakdown of the lower extremities.  Echo with preserved LV function EF 55%.  Improved symptoms         VTE prophylaxis: Lovenox

## 2019-09-28 NOTE — PROGRESS NOTES
[facility] Medicine Daily Progress Note    Date of Service  9/27/2019    Chief Complaint  68 y.o. male admitted 9/22/2019 with worsening shortness of breath.    Hospital Course      68-year-old male history of lung cancer, chronic resp failure follows with Dr. Llanos, no recent chemotherapy, recurrent pneumonia admitted with his third bout of pneumonia.  CT scan showed no PE with noted right lung mass with right lower lobe consolidation and mucous plugging.  Patient initiated on IV antibiotics.  Acute hypoxic respite failure requiring high O2.      Interval Problem Update    Persistent dyspnea with hypoxic respiratory requiring high flow with FiO2 50%.  Patient states he would like to proceed with hospice.  Understands he will transition off high flow nasal cannula.  Emphasizes he does not want to  be in any pain-- [facility] to evaluate.    Consultants/Specialty  Palliative care      Code Status  DNR/DNI    Disposition    Plan to hospice    Review of Systems  Review of Systems   Constitutional: Positive for malaise/fatigue. Negative for chills and fever.   HENT: Positive for congestion.    Eyes: Negative for discharge and redness.   Respiratory: Positive for cough, sputum production and shortness of breath. Negative for wheezing and stridor.    Cardiovascular: Positive for chest pain (Pleuritic right-sided) and leg swelling. Negative for palpitations.   Gastrointestinal: Negative for abdominal pain, diarrhea, nausea and vomiting.   Genitourinary: Negative for flank pain and hematuria.   Musculoskeletal: Negative for back pain, joint pain and neck pain.   Skin: Negative for itching and rash.   Neurological: Positive for weakness. Negative for tremors, sensory change, speech change and focal weakness.   Psychiatric/Behavioral: Negative for hallucinations, memory loss and substance abuse.        Physical Exam  Temp:  [36.1 °C (96.9 °F)-37.1 °C (98.8 °F)] 37.1 °C (98.8 °F)  Pulse:  [102-115] 112  Resp:   [16-24] 16  BP: ()/(60-68) 112/68  SpO2:  [90 %-99 %] 92 %    Physical Exam   Constitutional: He is oriented to person, place, and time. He appears distressed.   Alert, appropriate oriented, tachypneic with minimal exertion   HENT:   Head: Normocephalic and atraumatic.   Eyes: Conjunctivae and EOM are normal. No scleral icterus.   Neck: Normal range of motion. No JVD present.   Cardiovascular: Normal rate and regular rhythm.   No murmur heard.  Pulmonary/Chest: No stridor. He is in respiratory distress. He has no wheezes. He has rales. He exhibits tenderness.   Diminished breath sounds right mid to lower lung fields compared to left.  Sporadic inspiratory rhonchi.  Right chest wall tenderness to palpation.   Abdominal: Soft. He exhibits no distension. There is no tenderness.   Musculoskeletal: He exhibits no tenderness.   Neurological: He is alert and oriented to person, place, and time.   No gross focal weakness   Skin: Skin is warm and dry. He is not diaphoretic. No pallor.   Vitals reviewed.      Fluids    Intake/Output Summary (Last 24 hours) at 9/27/2019 1910  Last data filed at 9/27/2019 1700  Gross per 24 hour   Intake 720 ml   Output 850 ml   Net -130 ml       Laboratory  Recent Labs     09/24/19 2342 09/26/19 0013 09/27/19  0005   WBC 12.0* 12.9* 11.1*   RBC 3.25* 3.35* 3.13*   HEMOGLOBIN 9.9* 10.1* 9.2*   HEMATOCRIT 31.6* 32.9* 30.5*   MCV 97.2 98.2* 97.4   MCH 30.5 30.1 29.4   MCHC 31.3* 30.7* 30.2*   RDW 54.0* 53.5* 53.3*   PLATELETCT 300 327 300   MPV 8.9* 9.2 9.6     Recent Labs     09/24/19 2342 09/26/19 0013 09/27/19  0005   SODIUM 135 132* 133*   POTASSIUM 3.2* 3.1* 3.4*   CHLORIDE 89* 86* 89*   CO2 37* 37* 37*   GLUCOSE 115* 118* 109*   BUN 10 12 13   CREATININE 0.52 0.47* 0.49*   CALCIUM 10.1 10.0 9.5                   Imaging  DX-CHEST-PORTABLE (1 VIEW)   Final Result      1.  No significant change      2.  Combination of mass and atelectasis throughout the right hemithorax       EC-ECHOCARDIOGRAM COMPLETE W/O CONT   Final Result      DX-CHEST-PORTABLE (1 VIEW)   Final Result      1.  No significant change      2.  Combination of mass and atelectasis throughout the right hemithorax      CT-CTA CHEST PULMONARY ARTERY W/ RECONS   Final Result         1.  No evidence of pulmonary embolism.   2.  Large right lung mass consistent with history of lung cancer with relatively stable mass effect as detailed including mediastinal compression, encasement/narrowing of right sided bronchi and pulmonary arteries.   3.  There is new mucous plugging in the left lower lobe with new patchy left lower lobe opacities and some mildly increased consolidation in the right lower lobe. This is suspicious for pneumonitis, infection/aspiration.   4.  Pulmonary and adrenal metastases again noted.            US-EXTREMITY VENOUS UPPER UNILAT RIGHT   Final Result      DX-CHEST-PORTABLE (1 VIEW)   Final Result      1.  Large right upper lung mass again noted.   2.  Increase small right pleural effusion.   3.  Right basilar opacity may represent atelectasis, pneumonia and/or malignancy.           Assessment/Plan  * Malignant neoplasm of overlapping sites of right lung (HCC)- (present on admission)  Assessment & Plan  Right sided pleuritic chest pain due to malignancy.  Patient would like to proceed with hospice.  Continue scheduled MS Contin, Neurontin, PRN oxycodone, IV morphine.  Palliative care consultation.  [facility] to evaluate-feel this is reasonable as he has persistent severe dyspnea, severe pain from terminal lung cancer.    CAP (community acquired pneumonia)  Assessment & Plan  Post obstructive in setting of malignancy with mucous plugs seen and left lung.  No fevers, white cell count declined.  Continue with Mucomyst, Mucinex, CPT to try to mobilize secretions, bronchodilator therapy  Previous sputum culture positive Klebsiella-continue IV Zosyn        Sepsis (HCC)  Assessment & Plan  This is  Sepsis Present on admission with Tachycardia, with heart rate greater than 90 BPM, Tachypnea, with respirations greater than 20 per minute and Leukocyosis, with WBC greater than 12,000 and hypoxia.  Suspect postobstructive versus commutty acquired pneumonia.  Sepsis protocol initiated  Fluid resuscitation ordered per protocol  Previous sputum positive for Klebsiella.  Continue IV Zosyn            Acute on chronic respiratory failure with hypoxia (HCC)- (present on admission)  Assessment & Plan  Multifactorial due to postobstructive pneumonia with mucous plugs, advanced lung cancer, and atelectatic changes.   Bronchodilator therapy  Continue CPT, Mucinex, Mucomyst, RT protocols  IV antibiotics  Encourage I-S  Telemetry monitoring.  Difficulty titrating high flow.  Plan continue treatment as above.  Hospice evaluation    Hypokalemia  Assessment & Plan  Improved  Continue KCl    Normocytic anemia- (present on admission)  Assessment & Plan  Suspect due to chronic disease in setting of malignancy.  No symptoms of acute bleeding.  Follow-up hemoglobin stable.    Bilateral leg edema  Assessment & Plan  Patient with extensive bilateral leg edema with chronic changes with erythema and skin breakdown of the lower extremities.  Echo with preserved LV function EF 55%.  Improved symptoms  DC Lasix       VTE prophylaxis: Lovenox      Discussed with palliative care and multidisciplinary team plan of care.

## 2019-09-29 NOTE — HOSPICE
ATTN: Provider/Care management team/Nurses    RE: Referral to [facility]    Thank you for the referral to [facility] for the patient listed above. We have made contact with the patient. To access [facility] documentation, click on Chart Review and then click onto Encounters (left top corner of screen).      If you have any questions or concerns regarding the patient’s transition to [facility], please do not hesitate to contact us at [phone].     We look forward to collaborating with you,  [facility] Care Team

## 2019-09-29 NOTE — PROGRESS NOTES
Attempted to contact Jonny Mendoza) pt's POA at [phone] to notify of room change. Phone rang but Don does not have voicemail set up. Unable to leave a message at this time.

## 2019-09-29 NOTE — CARE PLAN
"  Problem: Safety  Goal: Will remain free from falls  Outcome: PROGRESSING AS EXPECTED  Intervention: Implement fall precautions  Note:   High fall risk precautions in place. Bed side commitment in place. Chair alarm on. Educated pt on the need to call. Chair locked. Hourly rounding. Non-skid socks applied.      Problem: Psychosocial Needs:  Goal: Level of anxiety will decrease  Outcome: PROGRESSING AS EXPECTED  Intervention: Identify and develop with patient strategies to cope with anxiety triggers  Note:   Pt expresses he is anxious when his POA/ friend Don isn't present. Gave first time dose of ativan, pt reports feeling less \"overwhelmed.\"     "

## 2019-09-29 NOTE — CARE PLAN
"  Problem: Safety  Goal: Will remain free from falls  Outcome: PROGRESSING AS EXPECTED  Intervention: Implement fall precautions  Note:   High fall risk precautions in place. Bed side commitment in place. Chair alarm on. Educated pt on the need to call. Chair locked. Hourly rounding. Non-skid socks applied.      Problem: Bowel/Gastric:  Goal: Will not experience complications related to bowel motility  Outcome: PROGRESSING AS EXPECTED  Intervention: Assess baseline bowel pattern  Note:   Pt reports not having a BM since 9/22. Had large brown Bm; incontinent.      Problem: Psychosocial Needs:  Goal: Level of anxiety will decrease  Outcome: PROGRESSING AS EXPECTED  Intervention: Identify and develop with patient strategies to cope with anxiety triggers  Note:   Pt expresses he is anxious when his POA/ friend Don isn't present. Gave first time dose of ativan, pt reports feeling less \"overwhelmed.\"     "

## 2019-09-29 NOTE — PROGRESS NOTES
"Assumed care of pt @ 1900. A&Ox4. Up x2. Afebrile. Comfort care measures in place. PIV flushed. Pt complains of 8/10 pain, medicated per MAR. Denies N/V numbness or tingling. High flow O2 in place; 55L, 35% O2. Incontinent of stool, Had BM today. Pt very anxious frequently asking about friend/POA Don. Contacted Don, [facility]. POC discussed with pt. Drug education provided on Ativan, all questions answered. Pt prefers to sleep in recliner, It's \"more comfortable.\" Pt & POA hoping to get hospice set up and go home. All needs met at this time. Hourly rounding. Bed & chair alarm on. Bed locked & in lowest position.   "

## 2019-09-29 NOTE — CARE PLAN
Problem: Communication  Goal: The ability to communicate needs accurately and effectively will improve  Outcome: PROGRESSING AS EXPECTED     Problem: Safety  Goal: Will remain free from injury  Outcome: PROGRESSING AS EXPECTED     Problem: Infection  Goal: Will remain free from infection  Outcome: PROGRESSING AS EXPECTED     Problem: Knowledge Deficit  Goal: Knowledge of disease process/condition, treatment plan, diagnostic tests, and medications will improve  Outcome: PROGRESSING AS EXPECTED

## 2019-09-29 NOTE — PROGRESS NOTES
Patient is alert. Morphine given for pain. Patient is setting up in the chair. Patient's friend Don was here this am. Don was very distraught watching patient breathing. Emotional support provide for patient and Don. Patient states he is comfortable at this time. Will continue to monitor. Chair alarm in place, call light within reach hourly rounding in practice.

## 2019-09-29 NOTE — PROGRESS NOTES
Pt transferred to [facility]. All patient belongings with patient including cell phone, glasses, and small bag of clothing.

## 2019-09-29 NOTE — PROGRESS NOTES
[facility] Medicine Daily Progress Note    Date of Service  9/29/2019    Chief Complaint  68 y.o. male admitted 9/22/2019 with shortness of breath.    Hospital Course    Patient known to me from previous admission - he was discharged on 9/19 and then readmitted on 9/22 with recurrent shortness of breath.  He was treated for Klebsiella pneumonia and required high flow oxygen to maintain saturations.  He made the decision to transition to comfort care and active disease treatments have stopped.      Interval Problem Update  Patient transitioned to CNU to continue with comfort care measures.  He is up to the recliner for comfort as this feels better than his bed.  [facility] is following along with patient.      Consultants/Specialty  hospice    Code Status  Comfort care/DNR    Disposition  Tbd, patient doesn't feel he can return home.    Review of Systems  Review of Systems   Constitutional: Positive for malaise/fatigue. Negative for chills and fever.   HENT: Negative for congestion and sore throat.    Eyes: Negative for blurred vision and photophobia.   Respiratory: Positive for sputum production, shortness of breath and wheezing. Negative for cough.    Cardiovascular: Negative for chest pain, claudication and leg swelling.   Gastrointestinal: Negative for abdominal pain, constipation, diarrhea, heartburn and vomiting.   Genitourinary: Negative for dysuria and hematuria.   Musculoskeletal: Negative for joint pain and myalgias.   Skin: Negative for itching and rash.   Neurological: Negative for dizziness, sensory change, speech change, weakness and headaches.   Psychiatric/Behavioral: Negative for depression. The patient is not nervous/anxious and does not have insomnia.         Physical Exam  Pulse:  [112] 112  Resp:  [18-22] 18  SpO2:  [96 %] 96 %    Physical Exam   Constitutional: He is oriented to person, place, and time. He appears well-developed and well-nourished. No distress.   HENT:   Head: Normocephalic  and atraumatic.   Eyes: Conjunctivae are normal. No scleral icterus.   Neck: Neck supple. No JVD present.   Cardiovascular: Normal rate, regular rhythm and normal heart sounds. Exam reveals no gallop and no friction rub.   No murmur heard.  Pulmonary/Chest: Effort normal and breath sounds normal. No respiratory distress. He has no wheezes. He exhibits no tenderness.   Abdominal: Soft. Bowel sounds are normal. He exhibits no distension and no mass. There is no tenderness.   Musculoskeletal: He exhibits no edema or tenderness.   Lymphadenopathy:     He has no cervical adenopathy.   Neurological: He is alert and oriented to person, place, and time. No cranial nerve deficit.   Skin: Skin is warm and dry. He is not diaphoretic. No erythema. No pallor.   Psychiatric: He has a normal mood and affect. His behavior is normal.   Nursing note and vitals reviewed.      Fluids  No intake or output data in the 24 hours ending 09/29/19 1343    Laboratory  Recent Labs     09/27/19  0005 09/28/19  0237   WBC 11.1* 11.5*   RBC 3.13* 3.17*   HEMOGLOBIN 9.2* 9.3*   HEMATOCRIT 30.5* 31.0*   MCV 97.4 97.8   MCH 29.4 29.3   MCHC 30.2* 30.0*   RDW 53.3* 52.7*   PLATELETCT 300 280   MPV 9.6 9.5     Recent Labs     09/27/19  0005   SODIUM 133*   POTASSIUM 3.4*   CHLORIDE 89*   CO2 37*   GLUCOSE 109*   BUN 13   CREATININE 0.49*   CALCIUM 9.5                   Imaging  DX-CHEST-PORTABLE (1 VIEW)   Final Result      1.  No significant change      2.  Combination of mass and atelectasis throughout the right hemithorax      EC-ECHOCARDIOGRAM COMPLETE W/O CONT   Final Result      DX-CHEST-PORTABLE (1 VIEW)   Final Result      1.  No significant change      2.  Combination of mass and atelectasis throughout the right hemithorax      CT-CTA CHEST PULMONARY ARTERY W/ RECONS   Final Result         1.  No evidence of pulmonary embolism.   2.  Large right lung mass consistent with history of lung cancer with relatively stable mass effect as detailed  including mediastinal compression, encasement/narrowing of right sided bronchi and pulmonary arteries.   3.  There is new mucous plugging in the left lower lobe with new patchy left lower lobe opacities and some mildly increased consolidation in the right lower lobe. This is suspicious for pneumonitis, infection/aspiration.   4.  Pulmonary and adrenal metastases again noted.            US-EXTREMITY VENOUS UPPER UNILAT RIGHT   Final Result      DX-CHEST-PORTABLE (1 VIEW)   Final Result      1.  Large right upper lung mass again noted.   2.  Increase small right pleural effusion.   3.  Right basilar opacity may represent atelectasis, pneumonia and/or malignancy.           Assessment/Plan  * Malignant neoplasm of overlapping sites of right lung (HCC)- (present on admission)  Assessment & Plan  Worsening  severe dyspnea, severe pain from terminal lung cancer-plan change to comfort care per patient's wishes.  Provide PRN IV morphine, Ativan for anxiety, atropine as needed.  Initiate comfort care protocols.  Discussed with palliative care.  Transferred to oncology floor.    CAP (community acquired pneumonia)  Assessment & Plan  Post obstructive in setting of malignancy with mucous plugs seen and left lung.  No fevers, white cell count declined.  Continue with Mucomyst, Mucinex, CPT to try to mobilize secretions, bronchodilator therapy  Previous sputum culture positive Klebsiella.    Antibiotics stopped with transition to comfort care        Sepsis (HCC)  Assessment & Plan  Comfort Care    This is Sepsis Present on admission with Tachycardia, with heart rate greater than 90 BPM, Tachypnea, with respirations greater than 20 per minute and Leukocyosis, with WBC greater than 12,000 and hypoxia.  Suspect postobstructive versus commutty acquired pneumonia.  Sepsis protocol initiated  Fluid resuscitation ordered per protocol  Previous sputum positive for Klebsiella.  No new sputum cultures obtainable.  Plan DC IV  antibiotics            Acute on chronic respiratory failure with hypoxia (HCC)- (present on admission)  Assessment & Plan  Multifactorial due to postobstructive pneumonia with mucous plugs, advanced lung cancer, and atelectatic changes-clinically worsening.    change to comfort care.  Discussed with patient.  Provide pain, dyspnea control with IV narcotics.  Patient currently wants to remain on high flow oxygen support.      Hypokalemia  Assessment & Plan  Plan change to comfort care    Normocytic anemia- (present on admission)  Assessment & Plan  Comfort care      Bilateral leg edema  Assessment & Plan  Patient with extensive bilateral leg edema with chronic changes with erythema and skin breakdown of the lower extremities.  Echo with preserved LV function EF 55%.  Improved symptoms           VTE prophylaxis: comfort care

## 2019-09-30 NOTE — DIETARY
Nutrition Services:  Pt followed by RD for poor oral intake.  Pt transitioned to comfort care.  Nutrition services to continue to provide diet as tolerated per pt.  RD remains available prn.

## 2019-09-30 NOTE — CARE PLAN
Problem: Nutritional:  Goal: Achieve adequate nutritional intake  Description  Patient will consume 50% of meals   Outcome: DISCHARGED-GOAL NOT MET   Pt transitioned to comfort care.

## 2019-09-30 NOTE — PROGRESS NOTES
Hospital Medicine Daily Progress Note    Date of Service  9/30/2019    Chief Complaint  68 y.o. male admitted 9/22/2019 with shortness of breath.    Hospital Course    Patient known to me from previous admission - he was discharged on 9/19 and then readmitted on 9/22 with recurrent shortness of breath.  He was treated for Klebsiella pneumonia and required high flow oxygen to maintain saturations.  He made the decision to transition to comfort care and active disease treatments have stopped.      Interval Problem Update  9/29 Patient transitioned to CNU to continue with comfort care measures.  He is up to the recliner for comfort as this feels better than his bed.  [facility] is following along with patient.    9/30 Patient with continued decompensation, waking infrequently and now moved from recliner to the bed.  Yesterday when questioned he told me he wanted to stay on the oxygen he was currently was on, as he continues to decline, will transition to nasal cannula and continued comfort measures.      Consultants/Specialty  hospice    Code Status  Comfort care/DNR    Disposition  Tbd, patient doesn't feel he can return home.    Review of Systems  Review of Systems   Constitutional: Positive for malaise/fatigue. Negative for chills and fever.   HENT: Negative for congestion and sore throat.    Eyes: Negative for blurred vision and photophobia.   Respiratory: Positive for sputum production, shortness of breath and wheezing. Negative for cough.    Cardiovascular: Negative for chest pain, claudication and leg swelling.   Gastrointestinal: Negative for abdominal pain, constipation, diarrhea, heartburn and vomiting.   Genitourinary: Negative for dysuria and hematuria.   Musculoskeletal: Negative for joint pain and myalgias.   Skin: Negative for itching and rash.   Neurological: Negative for dizziness, sensory change, speech change, weakness and headaches.   Psychiatric/Behavioral: Negative for depression. The patient  is not nervous/anxious and does not have insomnia.       Physical Exam  Temp:  [36.7 °C (98.1 °F)] 36.7 °C (98.1 °F)  Pulse:  [112] 112  Resp:  [18-20] 20  BP: (96)/(57) 96/57  SpO2:  [84 %] 84 %    Physical Exam   Constitutional: He appears well-developed and well-nourished. No distress.   HENT:   Head: Normocephalic and atraumatic.   Eyes: Conjunctivae are normal. No scleral icterus.   Neck: Neck supple. No JVD present.   Cardiovascular: Normal rate, regular rhythm and normal heart sounds. Exam reveals no gallop and no friction rub.   No murmur heard.  Pulmonary/Chest: Effort normal. No respiratory distress. He has wheezes. He has rales. He exhibits no tenderness.   Abdominal: Soft. Bowel sounds are normal. He exhibits no distension and no mass. There is no tenderness.   Musculoskeletal: He exhibits no edema or tenderness.   Lymphadenopathy:     He has no cervical adenopathy.   Neurological: No cranial nerve deficit.   Skin: Skin is warm and dry. He is not diaphoretic. No erythema. No pallor.   Psychiatric: He has a normal mood and affect. His behavior is normal.   Nursing note and vitals reviewed.      Fluids    Intake/Output Summary (Last 24 hours) at 9/30/2019 1156  Last data filed at 9/30/2019 0420  Gross per 24 hour   Intake --   Output 775 ml   Net -775 ml       Laboratory  Recent Labs     09/28/19  0237   WBC 11.5*   RBC 3.17*   HEMOGLOBIN 9.3*   HEMATOCRIT 31.0*   MCV 97.8   MCH 29.3   MCHC 30.0*   RDW 52.7*   PLATELETCT 280   MPV 9.5                       Imaging  DX-CHEST-PORTABLE (1 VIEW)   Final Result      1.  No significant change      2.  Combination of mass and atelectasis throughout the right hemithorax      EC-ECHOCARDIOGRAM COMPLETE W/O CONT   Final Result      DX-CHEST-PORTABLE (1 VIEW)   Final Result      1.  No significant change      2.  Combination of mass and atelectasis throughout the right hemithorax      CT-CTA CHEST PULMONARY ARTERY W/ RECONS   Final Result         1.  No evidence of  pulmonary embolism.   2.  Large right lung mass consistent with history of lung cancer with relatively stable mass effect as detailed including mediastinal compression, encasement/narrowing of right sided bronchi and pulmonary arteries.   3.  There is new mucous plugging in the left lower lobe with new patchy left lower lobe opacities and some mildly increased consolidation in the right lower lobe. This is suspicious for pneumonitis, infection/aspiration.   4.  Pulmonary and adrenal metastases again noted.            US-EXTREMITY VENOUS UPPER UNILAT RIGHT   Final Result      DX-CHEST-PORTABLE (1 VIEW)   Final Result      1.  Large right upper lung mass again noted.   2.  Increase small right pleural effusion.   3.  Right basilar opacity may represent atelectasis, pneumonia and/or malignancy.           Assessment/Plan  * Malignant neoplasm of overlapping sites of right lung (HCC)- (present on admission)  Assessment & Plan  Worsening  severe dyspnea, severe pain from terminal lung cancer-plan change to comfort care per patient's wishes.  Provide PRN IV morphine, Ativan for anxiety, atropine as needed.  Initiate comfort care protocols.  Discussed with palliative care.  Transferred to oncology floor.    CAP (community acquired pneumonia)  Assessment & Plan  Post obstructive in setting of malignancy with mucous plugs seen and left lung.  No fevers, white cell count declined.  Continue with Mucomyst, Mucinex, CPT to try to mobilize secretions, bronchodilator therapy  Previous sputum culture positive Klebsiella.    Antibiotics stopped with transition to comfort care        Sepsis (HCC)  Assessment & Plan  Comfort Care    This is Sepsis Present on admission with Tachycardia, with heart rate greater than 90 BPM, Tachypnea, with respirations greater than 20 per minute and Leukocyosis, with WBC greater than 12,000 and hypoxia.  Suspect postobstructive versus commutty acquired pneumonia.  Sepsis protocol initiated  Fluid  resuscitation ordered per protocol  Previous sputum positive for Klebsiella.  No new sputum cultures obtainable.  Plan DC IV antibiotics            Acute on chronic respiratory failure with hypoxia (HCC)- (present on admission)  Assessment & Plan  Multifactorial due to postobstructive pneumonia with mucous plugs, advanced lung cancer, and atelectatic changes-clinically worsening.    change to comfort care.  Discussed with patient.  Provide pain, dyspnea control with IV narcotics.  Patient currently wants to remain on high flow oxygen support.      Hypokalemia  Assessment & Plan  Plan change to comfort care    Normocytic anemia- (present on admission)  Assessment & Plan  Comfort care      Bilateral leg edema  Assessment & Plan  Patient with extensive bilateral leg edema with chronic changes with erythema and skin breakdown of the lower extremities.  Echo with preserved LV function EF 55%.  Improved symptoms         VTE prophylaxis: comfort care

## 2019-09-30 NOTE — PROGRESS NOTES
Patient transfer to bed from chair with 4 person assist. Fonseca catheter placed, oxygen still on for patient comfort. Bed alarm on frame on for patient safety. Will continue to monitor.

## 2019-09-30 NOTE — PALLIATIVE CARE
Spiritual Care Note    Patient Information     Patient's Name: Ladarius Khan   MRN: [id number]    YOB: 1950   Age and Gender: 68 y.o. male   Unit: Cancer Nursing Unit   Room (and Bed): R305   Ethnicity or Nationality: white   Primary Language: English   Amish/Spiritual Preference: None   Place of Residence: [place], [place]   Medical Diagnoses: malignant neoplasm of overlapping sites of right lung   community acquired pneumonia  sepsis   acute on chronic respiratory failure with hypoxia  hypokalemia  normocytic anemia  bilateral leg edema   Code Status: Comfort Care/DNR    Date of Admission: 9/22/2019   Length of Stay: 8 days        Spiritual Screen Results:  Is your spiritual health or inner well-being important to you as you cope with your medical condition?:     No  Would you like to receive a visit from our Spiritual Care team or your own Episcopalian or spiritual leader?:     No  Was spiritual care education provided to the patient?:     Yes  PC Sprituality screening comment:     Pt declined visit from       Encounter/Request Information  Nature of the Visit:     Initial, On shift  Crisis Visit:      Patient actively dying  Consult:      palliative care team, comfort care order  Referral From/Origin of Request:   Epic order  Observations/Symptoms:    Patient obtunded.  Unable to communicate effectively.      Spiritual Care Provider Information:  Title of Spiritual Care Provider:    Name of Spiritual Care Provider:   MARVIN Muniz  Phone Number:     [phone]   E-Mail:       [email or web address]

## 2019-09-30 NOTE — PROGRESS NOTES
Assumed care at 1900, bedside report received from day shift RN. Initial assessment completed, orders reviewed, call light within reach, and hourly rounding in place. POC addressed with patient, no additional questions at this time.

## 2019-10-01 NOTE — PROGRESS NOTES
Assumed care @ 1900. Report received from day RN.   Patient comfort care. Appears comfortable, no signs of distress. Period of apnea noted.       patient . MD updated. SILVESTRE Fuller updated.

## 2019-10-08 NOTE — DISCHARGE SUMMARY
Death Summary    Cause of Death  Cardiopulmonary arrest due to respiratory failure due to community acquired pneumonia due to lung cancer.    Comorbid Conditions at the Time of Death  Principal Problem:    Malignant neoplasm of overlapping sites of right lung (HCC) POA: Yes      Overview: Following with oncology and pulmonology      Pain management: MS contin 45 mg bid, gabapentin 600 mg tid, norco prn,       acetaminophen prn      For sob: duoneb prn, albuterol prn   Active Problems:    Acute on chronic respiratory failure with hypoxia (HCC) POA: Yes    Sepsis (HCC) POA: Unknown    CAP (community acquired pneumonia) POA: Unknown    Bilateral leg edema POA: Unknown      Overview: Furosemide + potassium    Normocytic anemia POA: Yes    Hypokalemia POA: Unknown  Resolved Problems:    * No resolved hospital problems. *      History of Presenting Illness and Hospital Course  This is a 68 y.o. male admitted 9/22/2019 with recurrent shortness of breath and recent hospitalization for pneumonia complicated by lung cancer.  He had a few good days following discharge and then symptoms recurred and he presented back to the hospital.  He again was started on treatment for pneumonia and sepsis but had decided that his quality of life was poor and he changed his status to comfort measures only.  He transitioned back to the cancer nursing unit on comfort care and passed away on 9/30/19.    Death Date: 09/30/19   Death Time: 1913         Pronounced By (RN1): Estrellita Cerrato   Pronounced By (RN2): Morenita To

## 2022-02-19 NOTE — ED PROVIDER NOTES
ED Provider Note    CHIEF COMPLAINT  Chief Complaint   Patient presents with   • Shortness of Breath     on chemo for lung cancer, worsening SOB last couple days, productive cough   • Sent by MD     has been on abx for couple days, has gotten worse, cancer doctor told him he has pneumonia and he should go to the hospital       HPI  Ladarius Khan is a 68 y.o. male who presents to the emergency department sent in by his doctor for possible pneumonia.  The patient has a history of lung cancer, and is currently undergoing chemotherapy.  He had a recent outpatient CT scan and his doctor called him and told to come to the hospital for pneumonia.  The patient states he had a cough and shortness of breath has been progressive over the last several weeks.  Describes becoming more more severe.  He has associated shortness of breath.  Has significant pain associate the tremors in his chest is chronically on morphine.  Denies any madhu pain.  States his chronic swelling in his legs.  Denies any other aggravating leaving factors or associated complaints    REVIEW OF SYSTEMS  See HPI for further details. All other systems are negative.    PAST MEDICAL HISTORY  Past Medical History:   Diagnosis Date   • Breath shortness    • Cancer (HCC) 2008    bladder    • Lung cancer (HCC)    • Pain     pain around rib cage to back    • Sinus infection 03/29/2019    nasal congestion, chest congestion        FAMILY HISTORY  Family History   Problem Relation Age of Onset   • No Known Problems Mother    • Lung Cancer Father         smoker   • Diabetes Neg Hx    • Heart Disease Neg Hx    • Stroke Neg Hx    • Drug abuse Neg Hx    • Alcohol abuse Neg Hx        SOCIAL HISTORY  Social History     Socioeconomic History   • Marital status: Single     Spouse name: Not on file   • Number of children: Not on file   • Years of education: Not on file   • Highest education level: Not on file   Occupational History   • Not on file   Social Needs   •  "Financial resource strain: Not on file   • Food insecurity:     Worry: Not on file     Inability: Not on file   • Transportation needs:     Medical: Not on file     Non-medical: Not on file   Tobacco Use   • Smoking status: Former Smoker     Packs/day: 1.00     Years: 50.00     Pack years: 50.00     Types: Cigarettes     Last attempt to quit: 2019     Years since quittin.4   • Smokeless tobacco: Never Used   Substance and Sexual Activity   • Alcohol use: No   • Drug use: Not Currently     Types: Marijuana, Inhaled     Comment:  history of marijuana use   • Sexual activity: Not Currently   Lifestyle   • Physical activity:     Days per week: Not on file     Minutes per session: Not on file   • Stress: Not on file   Relationships   • Social connections:     Talks on phone: Not on file     Gets together: Not on file     Attends [employer] service: Not on file     Active member of club or organization: Not on file     Attends meetings of clubs or organizations: Not on file     Relationship status: Not on file   • Intimate partner violence:     Fear of current or ex partner: Not on file     Emotionally abused: Not on file     Physically abused: Not on file     Forced sexual activity: Not on file   Other Topics Concern   • Not on file   Social History Narrative   • Not on file       SURGICAL HISTORY  Past Surgical History:   Procedure Laterality Date   • TRANS URETHRAL RESECTION BLADDER  08    Performed by RASHARD TANNER at SURGERY [facility]   • APPENDECTOMY     • TONSILLECTOMY         CURRENT MEDICATIONS  Home Medications    **Home medications have not yet been reviewed for this encounter**         ALLERGIES  No Known Allergies    PHYSICAL EXAM  VITAL SIGNS: /60   Pulse (!) 118   Temp 37.4 °C (99.3 °F) (Temporal)   Resp 16   Ht 1.93 m (6' 4\")   Wt 87 kg (191 lb 12.8 oz)   SpO2 94%   BMI 23.35 kg/m²    Constitutional: Awake alert ill-appearing no acute distress  HENT: Normocephalic, " Atraumatic, Bilateral external ears normal, Oropharynx dry, No oral exudates, Nose normal.   Eyes: PERRL, EOMI, Conjunctiva normal, No discharge.   Neck: Normal range of motion, No tenderness, Supple, No stridor.   Cardiovascular: Normal heart rate, Normal rhythm, No murmurs, No rubs, No gallops.   Thorax & Lungs: Diminished breath sounds in the right side.  Coarse crackles throughout.  Abdomen: Bowel sounds normal, Soft, No tenderness,  Skin: Warm, Dry, No erythema, No rash.   Musculoskeletal: Good range of motion in all major joints.   Neurologic: Alert, No focal deficits noted.  Moderate bilateral lower extremity edema.  Psychiatric: Affect anxious    Results for orders placed or performed during the hospital encounter of 09/13/19   LACTIC ACID   Result Value Ref Range    Lactic Acid 1.8 0.5 - 2.0 mmol/L   CBC WITH DIFFERENTIAL   Result Value Ref Range    WBC 10.3 4.8 - 10.8 K/uL    RBC 2.95 (L) 4.70 - 6.10 M/uL    Hemoglobin 9.1 (L) 14.0 - 18.0 g/dL    Hematocrit 29.0 (L) 42.0 - 52.0 %    MCV 98.3 (H) 81.4 - 97.8 fL    MCH 30.8 27.0 - 33.0 pg    MCHC 31.4 (L) 33.7 - 35.3 g/dL    RDW 55.1 (H) 35.9 - 50.0 fL    Platelet Count 363 164 - 446 K/uL    MPV 9.0 9.0 - 12.9 fL    Neutrophils-Polys 82.50 (H) 44.00 - 72.00 %    Lymphocytes 7.50 (L) 22.00 - 41.00 %    Monocytes 8.70 0.00 - 13.40 %    Eosinophils 0.50 0.00 - 6.90 %    Basophils 0.40 0.00 - 1.80 %    Immature Granulocytes 0.40 0.00 - 0.90 %    Nucleated RBC 0.00 /100 WBC    Neutrophils (Absolute) 8.47 (H) 1.82 - 7.42 K/uL    Lymphs (Absolute) 0.77 (L) 1.00 - 4.80 K/uL    Monos (Absolute) 0.89 (H) 0.00 - 0.85 K/uL    Eos (Absolute) 0.05 0.00 - 0.51 K/uL    Baso (Absolute) 0.04 0.00 - 0.12 K/uL    Immature Granulocytes (abs) 0.04 0.00 - 0.11 K/uL    NRBC (Absolute) 0.00 K/uL   COMP METABOLIC PANEL   Result Value Ref Range    Sodium 134 (L) 135 - 145 mmol/L    Potassium 3.9 3.6 - 5.5 mmol/L    Chloride 95 (L) 96 - 112 mmol/L    Co2 30 20 - 33 mmol/L    Anion Gap  9.0 0.0 - 11.9    Glucose 119 (H) 65 - 99 mg/dL    Bun 8 8 - 22 mg/dL    Creatinine 0.59 0.50 - 1.40 mg/dL    Calcium 9.6 8.5 - 10.5 mg/dL    AST(SGOT) 29 12 - 45 U/L    ALT(SGPT) 7 2 - 50 U/L    Alkaline Phosphatase 79 30 - 99 U/L    Total Bilirubin 0.5 0.1 - 1.5 mg/dL    Albumin 3.3 3.2 - 4.9 g/dL    Total Protein 6.7 6.0 - 8.2 g/dL    Globulin 3.4 1.9 - 3.5 g/dL    A-G Ratio 1.0 g/dL   ESTIMATED GFR   Result Value Ref Range    GFR If African American >60 >60 mL/min/1.73 m 2    GFR If Non African American >60 >60 mL/min/1.73 m 2        RADIOLOGY/PROCEDURES  DX-CHEST-PORTABLE (1 VIEW)   Final Result      Large right lung mass consistent with history of lung cancer.            COURSE & MEDICAL DECISION MAKING  Pertinent Labs & Imaging studies reviewed. (See chart for details)      Patient presents emergency department sent by his doctor because he has pneumonia and he has a history of lung cancer.  He had a recent outpatient CT that showed some significant abnormalities that he would like to review.    I have reviewed the results of the CT with the patient.  He does have a second obstructive pneumonia.      I do not think the patient is likely a component embolism.  Patient CT is markedly abnormal he has obstructive pneumonia and increasing tumor burden as etiology of his dyspnea.  He is cultured, and after discussion with the pharmacy start on broad-spectrum antibiotics to treat him for healthcare associated infections.  Labs have been obtained and reviewed.    The patient will be admitted.  His prognosis is guarded.  I reviewed the CT is extensive tumor burden.  Care is discussed with the hospitalist and care transferred at that time.    FINAL IMPRESSION  1. Malignant neoplasm of right lung, unspecified part of lung (HCC)     2. Pneumonia due to infectious organism, unspecified laterality, unspecified part of lung     3. Acute on chronic respiratory failure with hypoxia (HCC)         2.   3.         Electronically  signed by: Ran Thayer, 9/13/2019 1:40 PM     No

## 2024-05-20 NOTE — ED NOTES
Patient verbalized understanding of discharge instructions, provided with discharge paperwork, gait steady, ambulated independently to [facility].   Alert and oriented to person, place and time/Patient baseline mental status/Awake